# Patient Record
Sex: FEMALE | Race: BLACK OR AFRICAN AMERICAN | NOT HISPANIC OR LATINO | Employment: FULL TIME | URBAN - METROPOLITAN AREA
[De-identification: names, ages, dates, MRNs, and addresses within clinical notes are randomized per-mention and may not be internally consistent; named-entity substitution may affect disease eponyms.]

---

## 2017-01-05 DIAGNOSIS — R53.83 FATIGUE, UNSPECIFIED TYPE: Primary | ICD-10-CM

## 2017-01-05 DIAGNOSIS — R06.00 DYSPNEA, UNSPECIFIED TYPE: ICD-10-CM

## 2017-01-06 ENCOUNTER — DOCUMENTATION (OUTPATIENT)
Dept: BARIATRICS/WEIGHT MGMT | Facility: CLINIC | Age: 47
End: 2017-01-06

## 2017-01-06 RX ORDER — FLUOXETINE 10 MG/1
10 CAPSULE ORAL DAILY
COMMUNITY
End: 2017-01-17 | Stop reason: ALTCHOICE

## 2017-01-11 ENCOUNTER — LAB (OUTPATIENT)
Dept: LAB | Facility: HOSPITAL | Age: 47
End: 2017-01-11

## 2017-01-11 ENCOUNTER — HOSPITAL ENCOUNTER (OUTPATIENT)
Dept: CARDIOLOGY | Facility: HOSPITAL | Age: 47
Discharge: HOME | End: 2017-01-11

## 2017-01-11 ENCOUNTER — HOSPITAL ENCOUNTER (OUTPATIENT)
Dept: GENERAL RADIOLOGY | Facility: HOSPITAL | Age: 47
Discharge: HOME | End: 2017-01-11
Admitting: PHYSICIAN ASSISTANT

## 2017-01-11 DIAGNOSIS — R06.00 DYSPNEA, UNSPECIFIED TYPE: ICD-10-CM

## 2017-01-11 DIAGNOSIS — R53.83 FATIGUE, UNSPECIFIED TYPE: ICD-10-CM

## 2017-01-11 LAB
ALBUMIN SERPL-MCNC: 4 G/DL (ref 3.2–4.8)
ALBUMIN/GLOB SERPL: 1.3 G/DL (ref 1.5–2.5)
ALP SERPL-CCNC: 64 U/L (ref 25–100)
ALT SERPL W P-5'-P-CCNC: 14 U/L (ref 7–40)
ANION GAP SERPL CALCULATED.3IONS-SCNC: 8 MMOL/L (ref 3–11)
AST SERPL-CCNC: 15 U/L (ref 0–33)
BASOPHILS # BLD AUTO: 0.03 10*3/MM3 (ref 0–0.2)
BASOPHILS NFR BLD AUTO: 0.4 % (ref 0–1)
BILIRUB SERPL-MCNC: 0.4 MG/DL (ref 0.3–1.2)
BUN BLD-MCNC: 13 MG/DL (ref 9–23)
BUN/CREAT SERPL: 18.6 (ref 7–25)
CALCIUM SPEC-SCNC: 9.5 MG/DL (ref 8.7–10.4)
CHLORIDE SERPL-SCNC: 103 MMOL/L (ref 99–109)
CO2 SERPL-SCNC: 30 MMOL/L (ref 20–31)
CREAT BLD-MCNC: 0.7 MG/DL (ref 0.6–1.3)
DEPRECATED RDW RBC AUTO: 48.6 FL (ref 37–54)
EOSINOPHIL # BLD AUTO: 0.07 10*3/MM3 (ref 0.1–0.3)
EOSINOPHIL NFR BLD AUTO: 1 % (ref 0–3)
ERYTHROCYTE [DISTWIDTH] IN BLOOD BY AUTOMATED COUNT: 17 % (ref 11.3–14.5)
GFR SERPL CREATININE-BSD FRML MDRD: 110 ML/MIN/1.73
GLOBULIN UR ELPH-MCNC: 3.1 GM/DL
GLUCOSE BLD-MCNC: 96 MG/DL (ref 70–100)
HCT VFR BLD AUTO: 32.2 % (ref 34.5–44)
HGB BLD-MCNC: 10.2 G/DL (ref 11.5–15.5)
IMM GRANULOCYTES # BLD: 0.01 10*3/MM3 (ref 0–0.03)
IMM GRANULOCYTES NFR BLD: 0.1 % (ref 0–0.6)
LYMPHOCYTES # BLD AUTO: 2.43 10*3/MM3 (ref 0.6–4.8)
LYMPHOCYTES NFR BLD AUTO: 35.4 % (ref 24–44)
MCH RBC QN AUTO: 24.5 PG (ref 27–31)
MCHC RBC AUTO-ENTMCNC: 31.7 G/DL (ref 32–36)
MCV RBC AUTO: 77.4 FL (ref 80–99)
MONOCYTES # BLD AUTO: 0.54 10*3/MM3 (ref 0–1)
MONOCYTES NFR BLD AUTO: 7.9 % (ref 0–12)
NEUTROPHILS # BLD AUTO: 3.79 10*3/MM3 (ref 1.5–8.3)
NEUTROPHILS NFR BLD AUTO: 55.2 % (ref 41–71)
PLATELET # BLD AUTO: 481 10*3/MM3 (ref 150–450)
PMV BLD AUTO: 9.9 FL (ref 6–12)
POTASSIUM BLD-SCNC: 4.6 MMOL/L (ref 3.5–5.5)
PROT SERPL-MCNC: 7.1 G/DL (ref 5.7–8.2)
RBC # BLD AUTO: 4.16 10*6/MM3 (ref 3.89–5.14)
SODIUM BLD-SCNC: 141 MMOL/L (ref 132–146)
WBC NRBC COR # BLD: 6.87 10*3/MM3 (ref 3.5–10.8)

## 2017-01-11 PROCEDURE — 85025 COMPLETE CBC W/AUTO DIFF WBC: CPT | Performed by: PHYSICIAN ASSISTANT

## 2017-01-11 PROCEDURE — 71020 HC CHEST PA AND LATERAL: CPT

## 2017-01-11 PROCEDURE — 93005 ELECTROCARDIOGRAM TRACING: CPT | Performed by: PHYSICIAN ASSISTANT

## 2017-01-11 PROCEDURE — 80053 COMPREHEN METABOLIC PANEL: CPT | Performed by: PHYSICIAN ASSISTANT

## 2017-01-11 PROCEDURE — 93010 ELECTROCARDIOGRAM REPORT: CPT | Performed by: INTERNAL MEDICINE

## 2017-01-11 PROCEDURE — 36415 COLL VENOUS BLD VENIPUNCTURE: CPT

## 2017-01-16 ENCOUNTER — DOCUMENTATION (OUTPATIENT)
Dept: BARIATRICS/WEIGHT MGMT | Facility: CLINIC | Age: 47
End: 2017-01-16

## 2017-01-17 ENCOUNTER — CONSULT (OUTPATIENT)
Dept: BARIATRICS/WEIGHT MGMT | Facility: CLINIC | Age: 47
End: 2017-01-17

## 2017-01-17 VITALS
DIASTOLIC BLOOD PRESSURE: 88 MMHG | HEIGHT: 64 IN | BODY MASS INDEX: 43.79 KG/M2 | HEART RATE: 96 BPM | TEMPERATURE: 98.2 F | WEIGHT: 256.5 LBS | SYSTOLIC BLOOD PRESSURE: 141 MMHG

## 2017-01-17 DIAGNOSIS — E66.01 OBESITY, CLASS III, BMI 40-49.9 (MORBID OBESITY) (HCC): Primary | ICD-10-CM

## 2017-01-17 PROCEDURE — 99215 OFFICE O/P EST HI 40 MIN: CPT | Performed by: SURGERY

## 2017-01-17 RX ORDER — SCOLOPAMINE TRANSDERMAL SYSTEM 1 MG/1
1 PATCH, EXTENDED RELEASE TRANSDERMAL ONCE
Status: CANCELLED | OUTPATIENT
Start: 2017-01-17 | End: 2017-01-17

## 2017-01-17 RX ORDER — ACETAMINOPHEN 10 MG/ML
1000 INJECTION, SOLUTION INTRAVENOUS ONCE
Status: CANCELLED | OUTPATIENT
Start: 2017-01-17 | End: 2017-01-17

## 2017-01-17 RX ORDER — SODIUM CHLORIDE 0.9 % (FLUSH) 0.9 %
1-10 SYRINGE (ML) INJECTION AS NEEDED
Status: CANCELLED | OUTPATIENT
Start: 2017-01-17

## 2017-01-17 RX ORDER — SODIUM CHLORIDE, SODIUM LACTATE, POTASSIUM CHLORIDE, CALCIUM CHLORIDE 600; 310; 30; 20 MG/100ML; MG/100ML; MG/100ML; MG/100ML
150 INJECTION, SOLUTION INTRAVENOUS CONTINUOUS
Status: CANCELLED | OUTPATIENT
Start: 2017-01-17

## 2017-01-17 RX ORDER — CHLORHEXIDINE GLUCONATE 0.12 MG/ML
15 RINSE ORAL ONCE
Status: CANCELLED | OUTPATIENT
Start: 2017-01-17

## 2017-01-17 RX ORDER — PANTOPRAZOLE SODIUM 40 MG/10ML
40 INJECTION, POWDER, LYOPHILIZED, FOR SOLUTION INTRAVENOUS ONCE
Status: CANCELLED | OUTPATIENT
Start: 2017-01-17 | End: 2017-01-17

## 2017-01-17 NOTE — MR AVS SNAPSHOT
Laure LEIF Valentin   1/17/2017 4:30 PM   Consult    Dept Phone:  [phone]   Encounter #:  [id number]    Provider:  Eulalio Dick MD   Department:  [facility] BARIATRIC SURGERY                Your Full Care Plan              Today's Medication Changes          These changes are accurate as of: 1/17/17  6:18 PM.  If you have any questions, ask your nurse or doctor.               Stop taking medication(s)listed here:     FLUoxetine 10 MG capsule   Commonly known as:  PROzac   Stopped by:  Eulalio Dick MD           phentermine 37.5 MG tablet   Commonly known as:  ADIPEX-P   Stopped by:  Eulalio Dick MD           TOPAMAX PO   Stopped by:  Eulalio Dick MD                      Your Updated Medication List      Notice  As of 1/17/2017  6:18 PM    You have not been prescribed any medications.            We Performed the Following     Case Request     Clorhexidine Skin Prep     Obtain Informed Consent     Provide NPO Instructions to Patient       You Were Diagnosed With        Codes Comments    Obesity, Class III, BMI 40-49.9 (morbid obesity)    -  Primary ICD-10-CM: E66.01  ICD-9-CM: 278.01       Instructions     None    Patient Instructions History      Upcoming Appointments     Visit Type Date Time Department    CONSULT 1/17/2017  4:30 PM [facility] BARIATRIC SURG [facility]      [facility] Signup     Our records indicate that you have an active [employer] account.    You can view your After Visit Summary by going to [employer] and logging in with your [employer] username and password.  If you don't have a [employer] username and password but a parent or guardian has access to your record, the parent or guardian should login with their own [employer] username and password and access your record to view the After Visit Summary.    If you have questions, you can email [email or web address] or call [phone] to talk to our [employer] staff.   "Remember, MyChart is NOT to be used for urgent needs.  For medical emergencies, dial 911.               Other Info from Your Visit           Allergies     No Known Allergies      Reason for Visit     Consult           Vital Signs     Blood Pressure Pulse Temperature Height Weight Body Mass Index    141/88 (BP Location: Left arm, Patient Position: Sitting) 96 98.2 °F (36.8 °C) 64\" (162.6 cm) 256 lb 8 oz (116 kg) 44.03 kg/m2    Smoking Status                   Never Smoker           Problems and Diagnoses Noted     Obesity, Class III, BMI 40-49.9 (morbid obesity)    -  Primary        "

## 2017-01-17 NOTE — H&P
Create Note         My Note 6:00 PM   Edit           Expand All Collapse All    [facility] BARIATRIC SURGERY  [address]  [facility] [address] [phone]        Patient Name: Laure Valentin.  : 1970        Date of Visit: 2017        Chief Complaint: weight gain; unable to maintain weight loss. Preop LSG     History of Present Illness:     Laure Valentin is a 46 y.o. female who presents today for evaluation, education and consultation regarding weight loss surgery. The patient is interested in sleeve revision      Initial intake eval 16 w/ Dr. 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 Has been thinking about WLS for years, wants LSG. Has done an [place] online seminar, knows pts who have had WLS   Returns for final visit prior to LSG, 256.5/44 (?diff height listed). No changes since I saw her prev In hx or exam except stopped all meds         Medical History          Past Medical History   Diagnosis Date   • Heart murmur     • Heartburn     • Hyperlipidemia     • Joint pain     • Microcytic anemia         10.2/32.2 on iron, up from 9.4/32.4 on intake labs   • Morbid obesity     • Nephrolithiasis     • Thrombocytosis         481K          Surgical History          Past Surgical History   Procedure Laterality Date   • Laparoscopic tubal ligation      • Dilatation and curettage            No Known Allergies  No current outpatient prescriptions on file.   Social History    Social History            Social History   • Marital status:        Spouse name: N/A   • Number of children: N/A   • Years of education: N/A          Occupational History   • Not on file.           Social History Main Topics   • Smoking status: Never Smoker   • Smokeless tobacco: Never Used   • Alcohol use No   • Drug use: No   • Sexual activity: Not on file           Other Topics Concern   • Not on file      Social History Narrative                Family History   Problem Relation Age of Onset   • Hypertension Mother              Review of Systems:  Constitutional: The patient denies fevers and chills.  Cardiovascular: The patient denies heart disease.  Respiratory: The patient denies asthma and apnea.  Gastrointestinal: The patient reports heartburn.  Genitourinary: The patient denies renal insufficiency.   Musculoskeletal: The patient reports joint pain   Neurological: The patient denies seizure and stroke.  Psychiatric: The patient denies anxiety, depression and bipolar disorder.  Endocrine: The patient denies diabetes and thyroid disease.  Hematologic: The patient denies bleeding disorder.  Skin: The patient denies MRSA.     Physical Exam:  Vital Signs:  Weight: 256 lb 8 oz (116 kg)   Body mass index is 44.03 kg/(m^2).  Temp: 98.2 °F (36.8 °C)   Heart Rate: 96   BP: 141/88      Physical Exam   Constitutional: She is oriented to person, place, and time. She appears well-developed and well-nourished.   HENT:   Head: Normocephalic and atraumatic.   Eyes: Conjunctivae are normal. No scleral icterus.   Neck: Neck supple. Carotid bruit is not present. No thyromegaly present.   Cardiovascular: Normal rate and regular rhythm.   No murmur heard.  Pulmonary/Chest: Effort normal and breath sounds normal. No respiratory distress. She has no wheezes. She has no rales.   Abdominal: Soft. Bowel sounds are normal. She exhibits no distension and no mass. There is no hepatosplenomegaly. There is no tenderness. No hernia.       Scars: present btl vertical intra/inf umbo.   Musculoskeletal: Normal range of motion. She exhibits no edema.   Neurological: She is alert and oriented to person, place, and time. Gait normal.   Skin: Skin is warm and dry. No rash noted.   Psychiatric: She has a normal mood and affect. Judgment normal.   Vitals reviewed.        There is no problem list on file for this patient.  Psych Henry 7/16 approp  Kasper - several  "phenterimine     Assessment:     Laure Valentin is a 46 y.o. year old female with medically complicated obesity pursuing sleeve gastrectomy.     Weight loss surgery is deemed medically necessary given the following obesity related comorbidities including dyslipidemia with current Weight: 256 lb 8 oz (116 kg) and Body mass index is 44.03 kg/(m^2)..     Patient is aware that surgery is a tool, and that weight loss is not guaranteed but only seen in the context of appropriate use, follow up and exercise.     Complications  of laparoscopic/possible robotic gastric sleeve were discussed. The patient is well aware of the potential complications of surgery that include but not limited to bleeding, infections, deep venous thrombosis, pulmonary embolism, pulmonary complications such as pneumonia, cardiac events, hernias, small bowel obstruction, damage to the spleen or other organs, bowel injury, disfiguring scars, failure to lose weight, need for additional surgery, conversion to an open procedure, and death. Patient is also aware of complications which apply in this particular procedure that can include but are not limited to a \"leak\" at the staple line which in some instances may require conversion to gastric bypass.     Discussed the risks, benefits and alternative therapies at great length as outlined in our extensive consent forms, consent videos, and educational teaching process under the direction of the center's .     A copy of the patient's signed informed consent is on file.  > 10 min spent discussing avoiding tob and 2nd hand smoke to minimize risk leak           Eulalio Dick MD         Plan:  The patient has been advised that a letter of medical support must be obtained from her primary care physician or referring provider. A psychological evaluation will be performed for this patient as well. Preoperative testing will include: CBC, CMP, Fasting Lipids, TSH, H.Pylori, CXR and EKG "      The patient was advised to start a high protein and low carbohydrate diet. The patient was given individualized information by our dietician along with general group information and instructions.      Information on MARISEL educational video was given to the patient. This is an internet based educational video that explains the surgical procedure chosen and answers basic questions regarding that procedure.      Lastly, the consultation plan was reviewed with the patient.

## 2017-01-17 NOTE — PROGRESS NOTES
[facility] BARIATRIC SURGERY  [address]  [facility] [address]  [phone]      Patient  Name:  Laure Valentin.  :  1970      Date of Visit: 2017      Chief Complaint:  weight gain; unable to maintain weight loss. Preop LSG    History of Present Illness:    Laure Valentin is a 46 y.o. female who presents today for evaluation, education and consultation regarding weight loss surgery. The patient is interested in sleeve revision     Initial intake eval 16 w/ Dr. 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  Has been thinking about WLS for years, wants LSG.  Has done an [place] online seminar, knows pts who have had WLS   Returns for final visit prior to LSG, 256./44 (?diff height listed).  No changes since I saw her prev  In hx or exam except stopped all meds      Past Medical History   Diagnosis Date   • Heart murmur    • Heartburn    • Hyperlipidemia    • Joint pain    • Microcytic anemia      10.2/32.2 on iron, up from 9.4/32.4 on intake labs   • Morbid obesity    • Nephrolithiasis    • Thrombocytosis      481K     Past Surgical History   Procedure Laterality Date   • Laparoscopic tubal ligation     • Dilatation and curettage       No Known Allergies  No current outpatient prescriptions on file.  Social History     Social History   • Marital status:      Spouse name: N/A   • Number of children: N/A   • Years of education: N/A     Occupational History   • Not on file.     Social History Main Topics   • Smoking status: Never Smoker   • Smokeless tobacco: Never Used   • Alcohol use No   • Drug use: No   • Sexual activity: Not on file     Other Topics Concern   • Not on file     Social History Narrative     Family History   Problem Relation Age of Onset   • Hypertension Mother          Review of Systems:  Constitutional:  The patient denies fevers and chills.  Cardiovascular:  The patient denies heart disease.  Respiratory:  The  patient denies asthma and apnea.  Gastrointestinal:  The patient reports heartburn.  Genitourinary:  The patient denies renal insufficiency.    Musculoskeletal:  The patient reports joint pain   Neurological:  The patient denies seizure and stroke.  Psychiatric:  The patient denies anxiety, depression and bipolar disorder.  Endocrine:  The patient denies diabetes and thyroid disease.  Hematologic:  The patient denies bleeding disorder.  Skin:  The patient denies MRSA.    Physical Exam:  Vital Signs:  Weight: 256 lb 8 oz (116 kg)   Body mass index is 44.03 kg/(m^2).  Temp: 98.2 °F (36.8 °C)   Heart Rate: 96   BP: 141/88     Physical Exam   Constitutional: She is oriented to person, place, and time. She appears well-developed and well-nourished.   HENT:   Head: Normocephalic and atraumatic.   Eyes: Conjunctivae are normal. No scleral icterus.   Neck: Neck supple. Carotid bruit is not present. No thyromegaly present.   Cardiovascular: Normal rate and regular rhythm.    No murmur heard.  Pulmonary/Chest: Effort normal and breath sounds normal. No respiratory distress. She has no wheezes. She has no rales.   Abdominal: Soft. Bowel sounds are normal. She exhibits no distension and no mass. There is no hepatosplenomegaly. There is no tenderness. No hernia.       Scars:  present btl vertical intra/inf umbo.   Musculoskeletal: Normal range of motion. She exhibits no edema.   Neurological: She is alert and oriented to person, place, and time. Gait normal.   Skin: Skin is warm and dry. No rash noted.   Psychiatric: She has a normal mood and affect. Judgment normal.   Vitals reviewed.       There is no problem list on file for this patient.  Psych Brown 7/16 approp  Dinh - several phenterimine    Assessment:    Laure Valentin is a 46 y.o. year old female with medically complicated obesity pursuing sleeve gastrectomy.    Weight loss surgery is deemed medically necessary given the following obesity related comorbidities  "including dyslipidemia with current Weight: 256 lb 8 oz (116 kg) and Body mass index is 44.03 kg/(m^2)..    Patient is aware that surgery is a tool, and that weight loss is not guaranteed but only seen in the context of appropriate use, follow up and exercise.    Complications  of laparoscopic/possible robotic gastric sleeve were discussed. The patient is well aware of the potential complications of surgery that include but not limited to bleeding, infections, deep venous thrombosis, pulmonary embolism, pulmonary complications such as pneumonia, cardiac events, hernias, small bowel obstruction, damage to the spleen or other organs, bowel injury, disfiguring scars, failure to lose weight, need for additional surgery, conversion to an open procedure, and death. Patient is also aware of complications which apply in this particular procedure that can include but are not limited to a \"leak\" at the staple line which in some instances may require conversion to gastric bypass.    Discussed the risks, benefits and alternative therapies at great length as outlined in our extensive consent forms, consent videos, and educational teaching process under the direction of the center's .    A copy of the patient's signed informed consent is on file.  > 10 min spent discussing avoiding tob and 2nd hand smoke to minimize risk leak        Eulalio Dick MD       Plan:  The patient has been advised that a letter of medical support must be obtained from her primary care physician or referring provider. A psychological evaluation will be performed for this patient as well. Preoperative testing will include: CBC, CMP, Fasting Lipids, TSH, H.Pylori, CXR and EKG     The patient was advised to start a high protein and low carbohydrate diet.  The patient was given individualized information by our dietician along with general group information and instructions.     Information on MARISEL educational video was given to the " patient.  This is an internet based educational video that explains the surgical procedure chosen and answers basic questions regarding that procedure.     Lastly, the consultation plan was reviewed with the patient.  Pt aware may need periop blood transfusion, after d/c r/b/a rx agreeable if I deem approp

## 2017-01-17 NOTE — PROGRESS NOTES
[facility] BARIATRIC SURGERY  [address]  [facility] [address] [phone]      Patient  Name:  Laure Valentin.  :  1970      Date of Visit: 2017      Chief Complaint:  weight gain; unable to maintain weight loss    History of Present Illness:    Laure Valentin is a 46 y.o. female who presents today for evaluation, education and consultation regarding weight loss surgery. The patient is interested in sleeve revision     Initial intake eval 16 w/ Dr. 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  Has been thinking about WLS for years, wants LSG.  Has done an [place] online seminar, knows pts who have had WLS     Past Medical History   Diagnosis Date   • Heart murmur    • Heartburn    • Hyperlipidemia    • Joint pain    • Morbid obesity    • Nephrolithiasis      Past Surgical History   Procedure Laterality Date   • Laparoscopic tubal ligation     • Dilatation and curettage       No Known Allergies    Current Outpatient Prescriptions:   •  FLUoxetine (PROzac) 10 MG capsule, Take 10 mg by mouth Daily., Disp: , Rfl:   •  phentermine (ADIPEX-P) 37.5 MG tablet, Take 1 tablet by mouth Every Morning., Disp: 30 tablet, Rfl: 0  •  Topiramate (TOPAMAX PO), Take  by mouth., Disp: , Rfl:   Social History     Social History   • Marital status:      Spouse name: N/A   • Number of children: N/A   • Years of education: N/A     Occupational History   • Not on file.     Social History Main Topics   • Smoking status: Never Smoker   • Smokeless tobacco: Never Used   • Alcohol use No   • Drug use: No   • Sexual activity: Not on file     Other Topics Concern   • Not on file     Social History Narrative     Family History   Problem Relation Age of Onset   • Hypertension Mother          Review of Systems:  Constitutional:  The patient denies fevers and chills.  Cardiovascular:  The patient denies heart disease.  Respiratory:  The patient denies asthma and  apnea.  Gastrointestinal:  The patient reports heartburn.  Genitourinary:  The patient denies renal insufficiency.    Musculoskeletal:  The patient reports joint pain   Neurological:  The patient denies seizure and stroke.  Psychiatric:  The patient denies anxiety, depression and bipolar disorder.  Endocrine:  The patient denies diabetes and thyroid disease.  Hematologic:  The patient denies bleeding disorder.  Skin:  The patient denies MRSA.    Physical Exam:  Vital Signs:      There is no height or weight on file to calculate BMI.                Physical Exam   Constitutional: She is oriented to person, place, and time. She appears well-developed and well-nourished.   HENT:   Head: Normocephalic and atraumatic.   Eyes: Conjunctivae are normal. No scleral icterus.   Neck: Neck supple. No thyromegaly present.   Cardiovascular: Normal rate and regular rhythm.    No murmur heard.  Pulmonary/Chest: Effort normal and breath sounds normal. No respiratory distress. She has no wheezes. She has no rales.   Abdominal: Soft. Bowel sounds are normal. She exhibits no distension and no mass. There is no tenderness. No hernia.   Scars:  present btl vertical intra/inf umbo.   Musculoskeletal: Normal range of motion. She exhibits no edema.   Neurological: She is alert and oriented to person, place, and time. Gait normal.   Skin: Skin is warm and dry. No rash noted.   Psychiatric: She has a normal mood and affect. Judgment normal.   Vitals reviewed.       There is no problem list on file for this patient.      Assessment:    Laure Valentin is a 46 y.o. year old female with medically complicated obesity pursuing sleeve gastrectomy.    Weight loss surgery is deemed medically necessary given the following obesity related comorbidities including dyslipidemia with current   and There is no height or weight on file to calculate BMI..      Plan:  The patient has been advised that a letter of medical support must be obtained from her  primary care physician or referring provider. A psychological evaluation will be performed for this patient as well. Preoperative testing will include: CBC, CMP, Fasting Lipids, TSH, H.Pylori, CXR and EKG     The patient was advised to start a high protein and low carbohydrate diet.  The patient was given individualized information by our dietician along with general group information and instructions.     Information on MARISEL educational video was given to the patient.  This is an internet based educational video that explains the surgical procedure chosen and answers basic questions regarding that procedure.     Lastly, the consultation plan was reviewed with the patient.

## 2017-01-22 ENCOUNTER — APPOINTMENT (OUTPATIENT)
Dept: PREADMISSION TESTING | Facility: HOSPITAL | Age: 47
End: 2017-01-22

## 2017-01-22 LAB
DEPRECATED RDW RBC AUTO: 47.9 FL (ref 37–54)
ERYTHROCYTE [DISTWIDTH] IN BLOOD BY AUTOMATED COUNT: 17.2 % (ref 11.3–14.5)
HBA1C MFR BLD: 5.5 % (ref 4.8–5.6)
HCT VFR BLD AUTO: 36.9 % (ref 34.5–44)
HGB BLD-MCNC: 11.5 G/DL (ref 11.5–15.5)
MCH RBC QN AUTO: 24.1 PG (ref 27–31)
MCHC RBC AUTO-ENTMCNC: 31.2 G/DL (ref 32–36)
MCV RBC AUTO: 77.2 FL (ref 80–99)
PLATELET # BLD AUTO: 492 10*3/MM3 (ref 150–450)
PMV BLD AUTO: 10.6 FL (ref 6–12)
RBC # BLD AUTO: 4.78 10*6/MM3 (ref 3.89–5.14)
WBC NRBC COR # BLD: 7.62 10*3/MM3 (ref 3.5–10.8)

## 2017-01-22 PROCEDURE — 83036 HEMOGLOBIN GLYCOSYLATED A1C: CPT | Performed by: ANESTHESIOLOGY

## 2017-01-22 PROCEDURE — 85027 COMPLETE CBC AUTOMATED: CPT | Performed by: ANESTHESIOLOGY

## 2017-01-22 PROCEDURE — 36415 COLL VENOUS BLD VENIPUNCTURE: CPT

## 2017-01-22 RX ORDER — LANOLIN ALCOHOL/MO/W.PET/CERES
1000 CREAM (GRAM) TOPICAL DAILY
COMMUNITY
End: 2018-06-27

## 2017-01-22 RX ORDER — ASCORBIC ACID 500 MG
500 TABLET ORAL DAILY
COMMUNITY
End: 2018-01-19

## 2017-01-22 RX ORDER — FERROUS SULFATE TAB EC 324 MG (65 MG FE EQUIVALENT) 324 (65 FE) MG
324 TABLET DELAYED RESPONSE ORAL
COMMUNITY
End: 2018-06-27

## 2017-01-22 NOTE — DISCHARGE INSTRUCTIONS
The following information and instructions were given:    NPO after MN except sips of water with routine prescribed medication (except blood thinner, diabetes, or weight reducing medication) unless otherwise instructed by your physician.  Do not eat, drink, smoke or chew gum after MN the night before surgery. This also includes no mints.    DO NOT shave, wear makeup or dark nail polish.    Remove all jewelry (advised to go to jeweler if unable to remove).    Leave anything you consider valuable at home.    Leave your suitcase in the car until after your surgery.    Bring the following with you (if applicable)   -picture ID and insurance cards   -Co-pay/deductible required by insurance   -Medications in the original bottles (not a list) including all over-the-counter  medications if not brought to PAT   -Copy of advance directive, living will or power of  documents if not  brought to PAT   -CPAP or BIPAP mask and tubing (do not bring machine)   -Skin prep instructions sheet   -PAT Pass   Education booklet, brochure, handout or binder given to patient.    Pain Control After Surgery handout given to patient.    Respirex use (handout given to patient) and pneumonia prevention.    Signs and Symptoms of infection.    DVT Prevention stressing the importance of ambulation.    Patient to apply Chlorhexadine wipes to surgical area (as instructed) the night before procedure and the AM of procedure.      Post sleeve sheet given

## 2017-01-26 ENCOUNTER — HOSPITAL ENCOUNTER (INPATIENT)
Facility: HOSPITAL | Age: 47
LOS: 2 days | Discharge: HOME | End: 2017-01-28
Attending: SURGERY | Admitting: SURGERY

## 2017-01-26 ENCOUNTER — ANESTHESIA EVENT (OUTPATIENT)
Dept: PERIOP | Facility: HOSPITAL | Age: 47
End: 2017-01-26

## 2017-01-26 ENCOUNTER — ANESTHESIA (OUTPATIENT)
Dept: PERIOP | Facility: HOSPITAL | Age: 47
End: 2017-01-26

## 2017-01-26 DIAGNOSIS — E66.01 OBESITY, CLASS III, BMI 40-49.9 (MORBID OBESITY) (HCC): ICD-10-CM

## 2017-01-26 PROBLEM — E66.813 OBESITY, CLASS III, BMI 40-49.9 (MORBID OBESITY): Status: ACTIVE | Noted: 2017-01-26

## 2017-01-26 LAB
ABO GROUP BLD: NORMAL
B-HCG UR QL: NEGATIVE
BLD GP AB SCN SERPL QL: NEGATIVE
INTERNAL NEGATIVE CONTROL: NORMAL
INTERNAL POSITIVE CONTROL: REACTIVE
Lab: NORMAL
RH BLD: POSITIVE

## 2017-01-26 PROCEDURE — 0BQS4ZZ REPAIR LEFT DIAPHRAGM, PERCUTANEOUS ENDOSCOPIC APPROACH: ICD-10-PCS | Performed by: SURGERY

## 2017-01-26 PROCEDURE — 94799 UNLISTED PULMONARY SVC/PX: CPT

## 2017-01-26 PROCEDURE — 43775 LAP SLEEVE GASTRECTOMY: CPT | Performed by: SURGERY

## 2017-01-26 PROCEDURE — 0DB64Z3 EXCISION OF STOMACH, PERCUTANEOUS ENDOSCOPIC APPROACH, VERTICAL: ICD-10-PCS | Performed by: SURGERY

## 2017-01-26 PROCEDURE — 25010000002 FENTANYL CITRATE (PF) 100 MCG/2ML SOLUTION: Performed by: NURSE ANESTHETIST, CERTIFIED REGISTERED

## 2017-01-26 PROCEDURE — 25010000002 PROPOFOL 1000 MG/ML EMULSION: Performed by: NURSE ANESTHETIST, CERTIFIED REGISTERED

## 2017-01-26 PROCEDURE — 25010000002 PROMETHAZINE PER 50 MG: Performed by: NURSE ANESTHETIST, CERTIFIED REGISTERED

## 2017-01-26 PROCEDURE — 25010000002 NEOSTIGMINE 10 MG/10ML SOLUTION: Performed by: NURSE ANESTHETIST, CERTIFIED REGISTERED

## 2017-01-26 PROCEDURE — 86901 BLOOD TYPING SEROLOGIC RH(D): CPT

## 2017-01-26 PROCEDURE — 0DJ08ZZ INSPECTION OF UPPER INTESTINAL TRACT, VIA NATURAL OR ARTIFICIAL OPENING ENDOSCOPIC: ICD-10-PCS | Performed by: SURGERY

## 2017-01-26 PROCEDURE — 25010000002 ENOXAPARIN PER 10 MG: Performed by: SURGERY

## 2017-01-26 PROCEDURE — 93010 ELECTROCARDIOGRAM REPORT: CPT | Performed by: INTERNAL MEDICINE

## 2017-01-26 PROCEDURE — 25010000002 PROPOFOL 10 MG/ML EMULSION: Performed by: NURSE ANESTHETIST, CERTIFIED REGISTERED

## 2017-01-26 PROCEDURE — 88307 TISSUE EXAM BY PATHOLOGIST: CPT | Performed by: SURGERY

## 2017-01-26 PROCEDURE — 86850 RBC ANTIBODY SCREEN: CPT

## 2017-01-26 PROCEDURE — C1763 CONN TISS, NON-HUMAN: HCPCS | Performed by: SURGERY

## 2017-01-26 PROCEDURE — 25010000002 ONDANSETRON PER 1 MG: Performed by: NURSE ANESTHETIST, CERTIFIED REGISTERED

## 2017-01-26 PROCEDURE — 25010000002 BUPRENORPHINE PER 0.1 MG: Performed by: NURSE ANESTHETIST, CERTIFIED REGISTERED

## 2017-01-26 PROCEDURE — 86900 BLOOD TYPING SEROLOGIC ABO: CPT

## 2017-01-26 PROCEDURE — 93005 ELECTROCARDIOGRAM TRACING: CPT | Performed by: SURGERY

## 2017-01-26 PROCEDURE — 25010000002 MORPHINE PER 10 MG: Performed by: SURGERY

## 2017-01-26 PROCEDURE — 25010000002 DEXAMETHASONE SODIUM PHOSPHATE 10 MG/ML SOLUTION: Performed by: NURSE ANESTHETIST, CERTIFIED REGISTERED

## 2017-01-26 PROCEDURE — 25010000003 CEFAZOLIN IN DEXTROSE 2-4 GM/100ML-% SOLUTION: Performed by: SURGERY

## 2017-01-26 PROCEDURE — 0BQR4ZZ REPAIR RIGHT DIAPHRAGM, PERCUTANEOUS ENDOSCOPIC APPROACH: ICD-10-PCS | Performed by: SURGERY

## 2017-01-26 DEVICE — PERI-STRIPS DRY WITH VERITAS COLLAGEN MATRIX (PSD-V) IS PREPARED FROM DEHYDRATED BOVINE PERICARDIUM PROCURED FROM CATTLE UNDER 30 MONTHS OF AGE IN THE UNITED STATES. ONE (1) TUBE OF PSD GEL (GEL) IS PROVIDED FOR EVERY TWO (2) POUCHES OF PSD-V. THE GEL IS USED TO CREATE A TEMPORARY BOND BETWEEN THE PSD-V BUTTRESS AND THE SURGICAL STAPLER JAWS UNTIL THE STAPLER IS POSITIONED AND FIRED.
Type: IMPLANTABLE DEVICE | Site: STOMACH | Status: FUNCTIONAL
Brand: PERI-STRIPS DRY WITH VERITAS COLLAGEN MATRIX

## 2017-01-26 RX ORDER — FENTANYL CITRATE 50 UG/ML
50 INJECTION, SOLUTION INTRAMUSCULAR; INTRAVENOUS
Status: DISCONTINUED | OUTPATIENT
Start: 2017-01-26 | End: 2017-01-26 | Stop reason: HOSPADM

## 2017-01-26 RX ORDER — DIPHENHYDRAMINE HYDROCHLORIDE 50 MG/ML
25 INJECTION INTRAMUSCULAR; INTRAVENOUS EVERY 4 HOURS PRN
Status: DISCONTINUED | OUTPATIENT
Start: 2017-01-26 | End: 2017-01-28 | Stop reason: HOSPADM

## 2017-01-26 RX ORDER — MEPERIDINE HYDROCHLORIDE 25 MG/ML
12.5 INJECTION INTRAMUSCULAR; INTRAVENOUS; SUBCUTANEOUS
Status: DISCONTINUED | OUTPATIENT
Start: 2017-01-26 | End: 2017-01-26 | Stop reason: HOSPADM

## 2017-01-26 RX ORDER — ONDANSETRON 4 MG/1
4 TABLET, FILM COATED ORAL EVERY 6 HOURS PRN
Status: DISCONTINUED | OUTPATIENT
Start: 2017-01-26 | End: 2017-01-28 | Stop reason: HOSPADM

## 2017-01-26 RX ORDER — ALBUTEROL SULFATE 2.5 MG/3ML
2.5 SOLUTION RESPIRATORY (INHALATION) EVERY 4 HOURS PRN
Status: DISCONTINUED | OUTPATIENT
Start: 2017-01-26 | End: 2017-01-28 | Stop reason: HOSPADM

## 2017-01-26 RX ORDER — DEXAMETHASONE SODIUM PHOSPHATE 10 MG/ML
INJECTION, SOLUTION INTRAMUSCULAR; INTRAVENOUS AS NEEDED
Status: DISCONTINUED | OUTPATIENT
Start: 2017-01-26 | End: 2017-01-26 | Stop reason: SURG

## 2017-01-26 RX ORDER — ONDANSETRON 2 MG/ML
4 INJECTION INTRAMUSCULAR; INTRAVENOUS EVERY 6 HOURS PRN
Status: DISCONTINUED | OUTPATIENT
Start: 2017-01-26 | End: 2017-01-28 | Stop reason: HOSPADM

## 2017-01-26 RX ORDER — PROMETHAZINE HYDROCHLORIDE 25 MG/1
25 SUPPOSITORY RECTAL ONCE AS NEEDED
Status: COMPLETED | OUTPATIENT
Start: 2017-01-26 | End: 2017-01-26

## 2017-01-26 RX ORDER — PROMETHAZINE HYDROCHLORIDE 25 MG/ML
6.25 INJECTION, SOLUTION INTRAMUSCULAR; INTRAVENOUS ONCE AS NEEDED
Status: COMPLETED | OUTPATIENT
Start: 2017-01-26 | End: 2017-01-26

## 2017-01-26 RX ORDER — SCOLOPAMINE TRANSDERMAL SYSTEM 1 MG/1
1 PATCH, EXTENDED RELEASE TRANSDERMAL ONCE
Status: DISCONTINUED | OUTPATIENT
Start: 2017-01-26 | End: 2017-01-26

## 2017-01-26 RX ORDER — LORAZEPAM 2 MG/ML
0.5 INJECTION INTRAMUSCULAR EVERY 12 HOURS PRN
Status: DISCONTINUED | OUTPATIENT
Start: 2017-01-26 | End: 2017-01-28 | Stop reason: HOSPADM

## 2017-01-26 RX ORDER — PROMETHAZINE HYDROCHLORIDE 25 MG/ML
12.5 INJECTION, SOLUTION INTRAMUSCULAR; INTRAVENOUS EVERY 6 HOURS PRN
Status: DISCONTINUED | OUTPATIENT
Start: 2017-01-26 | End: 2017-01-28 | Stop reason: HOSPADM

## 2017-01-26 RX ORDER — LABETALOL HYDROCHLORIDE 5 MG/ML
10 INJECTION, SOLUTION INTRAVENOUS
Status: DISCONTINUED | OUTPATIENT
Start: 2017-01-26 | End: 2017-01-28 | Stop reason: HOSPADM

## 2017-01-26 RX ORDER — PANTOPRAZOLE SODIUM 40 MG/10ML
40 INJECTION, POWDER, LYOPHILIZED, FOR SOLUTION INTRAVENOUS
Status: DISCONTINUED | OUTPATIENT
Start: 2017-01-27 | End: 2017-01-28 | Stop reason: HOSPADM

## 2017-01-26 RX ORDER — FAMOTIDINE 20 MG/1
20 TABLET, FILM COATED ORAL ONCE
Status: CANCELLED | OUTPATIENT
Start: 2017-01-26 | End: 2017-01-26

## 2017-01-26 RX ORDER — BUPIVACAINE HYDROCHLORIDE AND EPINEPHRINE 2.5; 5 MG/ML; UG/ML
INJECTION, SOLUTION EPIDURAL; INFILTRATION; INTRACAUDAL; PERINEURAL AS NEEDED
Status: DISCONTINUED | OUTPATIENT
Start: 2017-01-26 | End: 2017-01-26 | Stop reason: HOSPADM

## 2017-01-26 RX ORDER — MIDAZOLAM HYDROCHLORIDE 1 MG/ML
1 INJECTION INTRAMUSCULAR; INTRAVENOUS
Status: DISCONTINUED | OUTPATIENT
Start: 2017-01-26 | End: 2017-01-26 | Stop reason: HOSPADM

## 2017-01-26 RX ORDER — ACETAMINOPHEN 10 MG/ML
1000 INJECTION, SOLUTION INTRAVENOUS EVERY 6 HOURS
Status: COMPLETED | OUTPATIENT
Start: 2017-01-26 | End: 2017-01-27

## 2017-01-26 RX ORDER — HYDROMORPHONE HYDROCHLORIDE 1 MG/ML
0.5 INJECTION, SOLUTION INTRAMUSCULAR; INTRAVENOUS; SUBCUTANEOUS
Status: DISCONTINUED | OUTPATIENT
Start: 2017-01-26 | End: 2017-01-26 | Stop reason: HOSPADM

## 2017-01-26 RX ORDER — PROPOFOL 10 MG/ML
VIAL (ML) INTRAVENOUS AS NEEDED
Status: DISCONTINUED | OUTPATIENT
Start: 2017-01-26 | End: 2017-01-26 | Stop reason: SURG

## 2017-01-26 RX ORDER — FAMOTIDINE 10 MG/ML
20 INJECTION, SOLUTION INTRAVENOUS ONCE
Status: CANCELLED | OUTPATIENT
Start: 2017-01-26 | End: 2017-01-26

## 2017-01-26 RX ORDER — HYDROCODONE BITARTRATE AND ACETAMINOPHEN 7.5; 325 MG/1; MG/1
1 TABLET ORAL EVERY 4 HOURS PRN
Status: DISCONTINUED | OUTPATIENT
Start: 2017-01-26 | End: 2017-01-28 | Stop reason: HOSPADM

## 2017-01-26 RX ORDER — GLYCOPYRROLATE 0.2 MG/ML
INJECTION INTRAMUSCULAR; INTRAVENOUS AS NEEDED
Status: DISCONTINUED | OUTPATIENT
Start: 2017-01-26 | End: 2017-01-26 | Stop reason: SURG

## 2017-01-26 RX ORDER — LIDOCAINE HYDROCHLORIDE 10 MG/ML
INJECTION, SOLUTION EPIDURAL; INFILTRATION; INTRACAUDAL; PERINEURAL AS NEEDED
Status: DISCONTINUED | OUTPATIENT
Start: 2017-01-26 | End: 2017-01-26 | Stop reason: SURG

## 2017-01-26 RX ORDER — SODIUM CHLORIDE 0.9 % (FLUSH) 0.9 %
1-10 SYRINGE (ML) INJECTION AS NEEDED
Status: CANCELLED | OUTPATIENT
Start: 2017-01-26

## 2017-01-26 RX ORDER — ONDANSETRON 2 MG/ML
4 INJECTION INTRAMUSCULAR; INTRAVENOUS ONCE AS NEEDED
Status: DISCONTINUED | OUTPATIENT
Start: 2017-01-26 | End: 2017-01-26 | Stop reason: HOSPADM

## 2017-01-26 RX ORDER — MORPHINE SULFATE 2 MG/ML
6 INJECTION, SOLUTION INTRAMUSCULAR; INTRAVENOUS
Status: DISCONTINUED | OUTPATIENT
Start: 2017-01-26 | End: 2017-01-28 | Stop reason: HOSPADM

## 2017-01-26 RX ORDER — BUPIVACAINE HYDROCHLORIDE 2.5 MG/ML
INJECTION, SOLUTION EPIDURAL; INFILTRATION; INTRACAUDAL AS NEEDED
Status: DISCONTINUED | OUTPATIENT
Start: 2017-01-26 | End: 2017-01-26 | Stop reason: SURG

## 2017-01-26 RX ORDER — MAGNESIUM HYDROXIDE 1200 MG/15ML
LIQUID ORAL AS NEEDED
Status: DISCONTINUED | OUTPATIENT
Start: 2017-01-26 | End: 2017-01-26 | Stop reason: HOSPADM

## 2017-01-26 RX ORDER — SODIUM CHLORIDE, SODIUM LACTATE, POTASSIUM CHLORIDE, CALCIUM CHLORIDE 600; 310; 30; 20 MG/100ML; MG/100ML; MG/100ML; MG/100ML
150 INJECTION, SOLUTION INTRAVENOUS CONTINUOUS
Status: DISCONTINUED | OUTPATIENT
Start: 2017-01-26 | End: 2017-01-26 | Stop reason: HOSPADM

## 2017-01-26 RX ORDER — SIMETHICONE 80 MG
80 TABLET,CHEWABLE ORAL 4 TIMES DAILY PRN
Status: DISCONTINUED | OUTPATIENT
Start: 2017-01-26 | End: 2017-01-28 | Stop reason: HOSPADM

## 2017-01-26 RX ORDER — BUPIVACAINE HCL/0.9 % NACL/PF 0.1 %
3 PLASTIC BAG, INJECTION (ML) EPIDURAL EVERY 8 HOURS
Status: COMPLETED | OUTPATIENT
Start: 2017-01-26 | End: 2017-01-26

## 2017-01-26 RX ORDER — PANTOPRAZOLE SODIUM 40 MG/10ML
40 INJECTION, POWDER, LYOPHILIZED, FOR SOLUTION INTRAVENOUS ONCE
Status: COMPLETED | OUTPATIENT
Start: 2017-01-26 | End: 2017-01-26

## 2017-01-26 RX ORDER — CHLORHEXIDINE GLUCONATE 0.12 MG/ML
15 RINSE ORAL ONCE
Status: COMPLETED | OUTPATIENT
Start: 2017-01-26 | End: 2017-01-26

## 2017-01-26 RX ORDER — ATRACURIUM BESYLATE 10 MG/ML
INJECTION, SOLUTION INTRAVENOUS AS NEEDED
Status: DISCONTINUED | OUTPATIENT
Start: 2017-01-26 | End: 2017-01-26 | Stop reason: SURG

## 2017-01-26 RX ORDER — LORAZEPAM 1 MG/1
1 TABLET ORAL EVERY 12 HOURS PRN
Status: DISCONTINUED | OUTPATIENT
Start: 2017-01-26 | End: 2017-01-27 | Stop reason: SDUPTHER

## 2017-01-26 RX ORDER — SODIUM CHLORIDE, SODIUM LACTATE, POTASSIUM CHLORIDE, CALCIUM CHLORIDE 600; 310; 30; 20 MG/100ML; MG/100ML; MG/100ML; MG/100ML
9 INJECTION, SOLUTION INTRAVENOUS CONTINUOUS
Status: CANCELLED | OUTPATIENT
Start: 2017-01-26

## 2017-01-26 RX ORDER — SODIUM CHLORIDE 9 MG/ML
INJECTION, SOLUTION INTRAVENOUS AS NEEDED
Status: DISCONTINUED | OUTPATIENT
Start: 2017-01-26 | End: 2017-01-26 | Stop reason: HOSPADM

## 2017-01-26 RX ORDER — NALOXONE HCL 0.4 MG/ML
0.4 VIAL (ML) INJECTION
Status: DISCONTINUED | OUTPATIENT
Start: 2017-01-26 | End: 2017-01-28 | Stop reason: HOSPADM

## 2017-01-26 RX ORDER — PROMETHAZINE HYDROCHLORIDE 25 MG/1
25 TABLET ORAL ONCE AS NEEDED
Status: COMPLETED | OUTPATIENT
Start: 2017-01-26 | End: 2017-01-26

## 2017-01-26 RX ORDER — FENTANYL CITRATE 50 UG/ML
INJECTION, SOLUTION INTRAMUSCULAR; INTRAVENOUS AS NEEDED
Status: DISCONTINUED | OUTPATIENT
Start: 2017-01-26 | End: 2017-01-26 | Stop reason: SURG

## 2017-01-26 RX ORDER — LIDOCAINE HYDROCHLORIDE 10 MG/ML
1 INJECTION, SOLUTION EPIDURAL; INFILTRATION; INTRACAUDAL; PERINEURAL ONCE
Status: COMPLETED | OUTPATIENT
Start: 2017-01-26 | End: 2017-01-26

## 2017-01-26 RX ORDER — ONDANSETRON 2 MG/ML
INJECTION INTRAMUSCULAR; INTRAVENOUS AS NEEDED
Status: DISCONTINUED | OUTPATIENT
Start: 2017-01-26 | End: 2017-01-26 | Stop reason: SURG

## 2017-01-26 RX ORDER — HYDROMORPHONE HYDROCHLORIDE 2 MG/1
2 TABLET ORAL EVERY 4 HOURS PRN
Status: DISCONTINUED | OUTPATIENT
Start: 2017-01-26 | End: 2017-01-28 | Stop reason: HOSPADM

## 2017-01-26 RX ORDER — METOCLOPRAMIDE HYDROCHLORIDE 5 MG/ML
10 INJECTION INTRAMUSCULAR; INTRAVENOUS EVERY 6 HOURS PRN
Status: DISCONTINUED | OUTPATIENT
Start: 2017-01-26 | End: 2017-01-28 | Stop reason: HOSPADM

## 2017-01-26 RX ORDER — BUPRENORPHINE HYDROCHLORIDE 0.32 MG/ML
INJECTION INTRAMUSCULAR; INTRAVENOUS AS NEEDED
Status: DISCONTINUED | OUTPATIENT
Start: 2017-01-26 | End: 2017-01-26 | Stop reason: SURG

## 2017-01-26 RX ORDER — SODIUM CHLORIDE AND POTASSIUM CHLORIDE 150; 450 MG/100ML; MG/100ML
125 INJECTION, SOLUTION INTRAVENOUS CONTINUOUS
Status: DISCONTINUED | OUTPATIENT
Start: 2017-01-27 | End: 2017-01-28 | Stop reason: HOSPADM

## 2017-01-26 RX ORDER — ACETAMINOPHEN 10 MG/ML
1000 INJECTION, SOLUTION INTRAVENOUS ONCE
Status: COMPLETED | OUTPATIENT
Start: 2017-01-26 | End: 2017-01-26

## 2017-01-26 RX ORDER — MORPHINE SULFATE 4 MG/ML
4 INJECTION, SOLUTION INTRAMUSCULAR; INTRAVENOUS
Status: DISCONTINUED | OUTPATIENT
Start: 2017-01-26 | End: 2017-01-27 | Stop reason: SDUPTHER

## 2017-01-26 RX ORDER — FIBRINOGEN HUMAN AND THROMBIN HUMAN 4 ML
KIT TOPICAL AS NEEDED
Status: DISCONTINUED | OUTPATIENT
Start: 2017-01-26 | End: 2017-01-26 | Stop reason: HOSPADM

## 2017-01-26 RX ORDER — CEFAZOLIN SODIUM 2 G/100ML
2 INJECTION, SOLUTION INTRAVENOUS ONCE
Status: COMPLETED | OUTPATIENT
Start: 2017-01-26 | End: 2017-01-26

## 2017-01-26 RX ORDER — SODIUM CHLORIDE, SODIUM LACTATE, POTASSIUM CHLORIDE, CALCIUM CHLORIDE 600; 310; 30; 20 MG/100ML; MG/100ML; MG/100ML; MG/100ML
150 INJECTION, SOLUTION INTRAVENOUS CONTINUOUS
Status: DISCONTINUED | OUTPATIENT
Start: 2017-01-26 | End: 2017-01-27 | Stop reason: SDUPTHER

## 2017-01-26 RX ORDER — ESMOLOL HYDROCHLORIDE 10 MG/ML
INJECTION INTRAVENOUS AS NEEDED
Status: DISCONTINUED | OUTPATIENT
Start: 2017-01-26 | End: 2017-01-26 | Stop reason: SURG

## 2017-01-26 RX ORDER — PROMETHAZINE HYDROCHLORIDE 25 MG/ML
12.5 INJECTION, SOLUTION INTRAMUSCULAR; INTRAVENOUS EVERY 4 HOURS PRN
Status: DISCONTINUED | OUTPATIENT
Start: 2017-01-26 | End: 2017-01-28 | Stop reason: HOSPADM

## 2017-01-26 RX ORDER — NEOSTIGMINE METHYLSULFATE 1 MG/ML
INJECTION, SOLUTION INTRAVENOUS AS NEEDED
Status: DISCONTINUED | OUTPATIENT
Start: 2017-01-26 | End: 2017-01-26 | Stop reason: SURG

## 2017-01-26 RX ORDER — NALOXONE HCL 0.4 MG/ML
0.4 VIAL (ML) INJECTION
Status: DISCONTINUED | OUTPATIENT
Start: 2017-01-26 | End: 2017-01-26 | Stop reason: SDUPTHER

## 2017-01-26 RX ORDER — CLONIDINE HYDROCHLORIDE 0.1 MG/1
0.1 TABLET ORAL EVERY 6 HOURS PRN
Status: DISCONTINUED | OUTPATIENT
Start: 2017-01-26 | End: 2017-01-28 | Stop reason: HOSPADM

## 2017-01-26 RX ORDER — CYANOCOBALAMIN 1000 UG/ML
1000 INJECTION, SOLUTION INTRAMUSCULAR; SUBCUTANEOUS ONCE
Status: COMPLETED | OUTPATIENT
Start: 2017-01-27 | End: 2017-01-27

## 2017-01-26 RX ADMIN — BUPIVACAINE HYDROCHLORIDE 75 MG: 2.5 INJECTION, SOLUTION EPIDURAL; INFILTRATION; INTRACAUDAL; PERINEURAL at 10:52

## 2017-01-26 RX ADMIN — ACETAMINOPHEN 1000 MG: 10 INJECTION, SOLUTION INTRAVENOUS at 23:53

## 2017-01-26 RX ADMIN — SODIUM CHLORIDE, POTASSIUM CHLORIDE, SODIUM LACTATE AND CALCIUM CHLORIDE 150 ML/HR: 600; 310; 30; 20 INJECTION, SOLUTION INTRAVENOUS at 14:04

## 2017-01-26 RX ADMIN — ATRACURIUM BESYLATE 10 MG: 10 INJECTION, SOLUTION INTRAVENOUS at 11:32

## 2017-01-26 RX ADMIN — ROBINUL 0.2 MG: 0.2 INJECTION INTRAMUSCULAR; INTRAVENOUS at 13:27

## 2017-01-26 RX ADMIN — SODIUM CHLORIDE, POTASSIUM CHLORIDE, SODIUM LACTATE AND CALCIUM CHLORIDE 150 ML/HR: 600; 310; 30; 20 INJECTION, SOLUTION INTRAVENOUS at 08:38

## 2017-01-26 RX ADMIN — DEXAMETHASONE SODIUM PHOSPHATE 6 MG: 10 INJECTION, SOLUTION INTRAMUSCULAR; INTRAVENOUS at 11:08

## 2017-01-26 RX ADMIN — Medication 3 G: at 23:53

## 2017-01-26 RX ADMIN — NEOSTIGMINE METHYLSULFATE 3 MG: 1 INJECTION, SOLUTION INTRAVENOUS at 13:27

## 2017-01-26 RX ADMIN — PANTOPRAZOLE SODIUM 40 MG: 40 INJECTION, POWDER, FOR SOLUTION INTRAVENOUS at 08:13

## 2017-01-26 RX ADMIN — FENTANYL CITRATE 50 MCG: 50 INJECTION, SOLUTION INTRAMUSCULAR; INTRAVENOUS at 14:25

## 2017-01-26 RX ADMIN — ESMOLOL HYDROCHLORIDE 10 MG: 10 INJECTION, SOLUTION INTRAVENOUS at 13:08

## 2017-01-26 RX ADMIN — ATRACURIUM BESYLATE 10 MG: 10 INJECTION, SOLUTION INTRAVENOUS at 12:40

## 2017-01-26 RX ADMIN — SIMETHICONE CHEW TAB 80 MG 80 MG: 80 TABLET ORAL at 17:16

## 2017-01-26 RX ADMIN — SODIUM CHLORIDE, POTASSIUM CHLORIDE, SODIUM LACTATE AND CALCIUM CHLORIDE 1000 ML: 600; 310; 30; 20 INJECTION, SOLUTION INTRAVENOUS at 08:01

## 2017-01-26 RX ADMIN — PROMETHAZINE HYDROCHLORIDE 6.25 MG: 25 INJECTION INTRAMUSCULAR; INTRAVENOUS at 13:55

## 2017-01-26 RX ADMIN — SIMETHICONE CHEW TAB 80 MG 80 MG: 80 TABLET ORAL at 21:00

## 2017-01-26 RX ADMIN — DEXAMETHASONE SODIUM PHOSPHATE 2 MG: 10 INJECTION, SOLUTION INTRAMUSCULAR; INTRAVENOUS at 10:52

## 2017-01-26 RX ADMIN — Medication 3 G: at 16:09

## 2017-01-26 RX ADMIN — ONDANSETRON 4 MG: 2 INJECTION INTRAMUSCULAR; INTRAVENOUS at 13:27

## 2017-01-26 RX ADMIN — PROPOFOL 20 MCG/KG/MIN: 10 INJECTION, EMULSION INTRAVENOUS at 10:44

## 2017-01-26 RX ADMIN — LIDOCAINE HYDROCHLORIDE 0.2 ML: 10 INJECTION, SOLUTION EPIDURAL; INFILTRATION; INTRACAUDAL; PERINEURAL at 08:01

## 2017-01-26 RX ADMIN — PROPOFOL 100 MG: 10 INJECTION, EMULSION INTRAVENOUS at 10:49

## 2017-01-26 RX ADMIN — CHLORHEXIDINE GLUCONATE 60 ML: 1.2 RINSE ORAL at 08:14

## 2017-01-26 RX ADMIN — ACETAMINOPHEN 1000 MG: 10 INJECTION, SOLUTION INTRAVENOUS at 17:10

## 2017-01-26 RX ADMIN — ATRACURIUM BESYLATE 10 MG: 10 INJECTION, SOLUTION INTRAVENOUS at 12:04

## 2017-01-26 RX ADMIN — DEXAMETHASONE SODIUM PHOSPHATE 2 MG: 10 INJECTION, SOLUTION INTRAMUSCULAR; INTRAVENOUS at 10:49

## 2017-01-26 RX ADMIN — PROPOFOL 200 MG: 10 INJECTION, EMULSION INTRAVENOUS at 10:48

## 2017-01-26 RX ADMIN — ESMOLOL HYDROCHLORIDE 10 MG: 10 INJECTION, SOLUTION INTRAVENOUS at 11:08

## 2017-01-26 RX ADMIN — CEFAZOLIN SODIUM 2 G: 2 INJECTION, SOLUTION INTRAVENOUS at 10:43

## 2017-01-26 RX ADMIN — BUPRENORPHINE HYDROCHLORIDE 0.15 MG: 0.3 INJECTION INTRAMUSCULAR; INTRAVENOUS at 10:52

## 2017-01-26 RX ADMIN — FENTANYL CITRATE 50 MCG: 50 INJECTION, SOLUTION INTRAMUSCULAR; INTRAVENOUS at 13:57

## 2017-01-26 RX ADMIN — FENTANYL CITRATE 100 MCG: 50 INJECTION, SOLUTION INTRAMUSCULAR; INTRAVENOUS at 10:48

## 2017-01-26 RX ADMIN — ACETAMINOPHEN 1000 MG: 10 INJECTION, SOLUTION INTRAVENOUS at 11:35

## 2017-01-26 RX ADMIN — BUPIVACAINE HYDROCHLORIDE 75 MG: 2.5 INJECTION, SOLUTION EPIDURAL; INFILTRATION; INTRACAUDAL; PERINEURAL at 10:49

## 2017-01-26 RX ADMIN — BUPRENORPHINE HYDROCHLORIDE 0.15 MG: 0.3 INJECTION INTRAMUSCULAR; INTRAVENOUS at 10:49

## 2017-01-26 RX ADMIN — ATRACURIUM BESYLATE 50 MG: 10 INJECTION, SOLUTION INTRAVENOUS at 10:48

## 2017-01-26 RX ADMIN — MORPHINE SULFATE 4 MG: 4 INJECTION, SOLUTION INTRAMUSCULAR; INTRAVENOUS at 21:04

## 2017-01-26 RX ADMIN — LIDOCAINE HYDROCHLORIDE 50 MG: 10 INJECTION, SOLUTION EPIDURAL; INFILTRATION; INTRACAUDAL; PERINEURAL at 10:48

## 2017-01-26 RX ADMIN — SCOPALAMINE 1 PATCH: 1 PATCH, EXTENDED RELEASE TRANSDERMAL at 08:13

## 2017-01-26 NOTE — ANESTHESIA PROCEDURE NOTES
Airway  Urgency: elective    Date/Time: 1/26/2017 10:46 AM  End Time:1/26/2017 10:51 AM  Airway not difficult    General Information and Staff    Patient location during procedure: OR  Anesthesiologist: BETINA QIU  CRNA: ELIESER SUTTON    Indications and Patient Condition  Indications for airway management: airway protection    Preoxygenated: yes  MILS not maintained throughout  Mask difficulty assessment: 1 - vent by mask    Final Airway Details  Final airway type: endotracheal airway      Successful airway: ETT  Cuffed: yes   Successful intubation technique: direct laryngoscopy  Endotracheal tube insertion site: oral  Blade: Farzana  Blade size: #3  ETT size: 7.0 mm  Cormack-Lehane Classification: grade I - full view of glottis  Placement verified by: chest auscultation and capnometry   Inital cuff pressure (cm H2O): 20  Cuff volume (mL): 6  Measured from: lips  ETT to lips (cm): 20  Number of attempts at approach: 1    Additional Comments  Negative epigastric sounds, Breath sound equal bilaterally with symmetric chest rise and fall

## 2017-01-26 NOTE — ANESTHESIA POSTPROCEDURE EVALUATION
Patient: Laure YADAV Barbie    Procedure Summary     Date Anesthesia Start Anesthesia Stop Room / Location    01/26/17 1043   [facility] OR 02 /  [facility]       Procedure Diagnosis Surgeon Provider    GASTRIC SLEEVE LAPAROSCOPIC (N/A Abdomen); HIATAL HERNIA REPAIR LAPAROSCOPIC (N/A Abdomen) Obesity, Class III, BMI 40-49.9 (morbid obesity); Hiatal hernia  (Obesity, Class III, BMI 40-49.9 (morbid obesity) [E66.01]) MD Jagdish Serrato MD          Anesthesia Type: general  Last vitals  BP   132/72   Temp   97.   Pulse  100   Resp   20   SpO2   97%     Anesthesia Post [id number]

## 2017-01-27 ENCOUNTER — APPOINTMENT (OUTPATIENT)
Dept: GENERAL RADIOLOGY | Facility: HOSPITAL | Age: 47
End: 2017-01-27
Attending: SURGERY

## 2017-01-27 LAB
ALBUMIN SERPL-MCNC: 3.8 G/DL (ref 3.2–4.8)
ALBUMIN/GLOB SERPL: 1.3 G/DL (ref 1.5–2.5)
ALP SERPL-CCNC: 49 U/L (ref 25–100)
ALT SERPL W P-5'-P-CCNC: 36 U/L (ref 7–40)
ANION GAP SERPL CALCULATED.3IONS-SCNC: 9 MMOL/L (ref 3–11)
AST SERPL-CCNC: 42 U/L (ref 0–33)
BASOPHILS # BLD AUTO: 0.01 10*3/MM3 (ref 0–0.2)
BASOPHILS # BLD AUTO: 0.03 10*3/MM3 (ref 0–0.2)
BASOPHILS NFR BLD AUTO: 0.1 % (ref 0–1)
BASOPHILS NFR BLD AUTO: 0.2 % (ref 0–1)
BILIRUB SERPL-MCNC: 0.4 MG/DL (ref 0.3–1.2)
BUN BLD-MCNC: 8 MG/DL (ref 9–23)
BUN/CREAT SERPL: 13.3 (ref 7–25)
CALCIUM SPEC-SCNC: 9.1 MG/DL (ref 8.7–10.4)
CHLORIDE SERPL-SCNC: 104 MMOL/L (ref 99–109)
CO2 SERPL-SCNC: 25 MMOL/L (ref 20–31)
CREAT BLD-MCNC: 0.6 MG/DL (ref 0.6–1.3)
CYTO UR: NORMAL
DEPRECATED RDW RBC AUTO: 51.8 FL (ref 37–54)
DEPRECATED RDW RBC AUTO: 51.9 FL (ref 37–54)
EOSINOPHIL # BLD AUTO: 0 10*3/MM3 (ref 0.1–0.3)
EOSINOPHIL # BLD AUTO: 0 10*3/MM3 (ref 0.1–0.3)
EOSINOPHIL NFR BLD AUTO: 0 % (ref 0–3)
EOSINOPHIL NFR BLD AUTO: 0 % (ref 0–3)
ERYTHROCYTE [DISTWIDTH] IN BLOOD BY AUTOMATED COUNT: 18.2 % (ref 11.3–14.5)
ERYTHROCYTE [DISTWIDTH] IN BLOOD BY AUTOMATED COUNT: 18.3 % (ref 11.3–14.5)
GFR SERPL CREATININE-BSD FRML MDRD: 131 ML/MIN/1.73
GLOBULIN UR ELPH-MCNC: 2.9 GM/DL
GLUCOSE BLD-MCNC: 128 MG/DL (ref 70–100)
HCT VFR BLD AUTO: 31.3 % (ref 34.5–44)
HCT VFR BLD AUTO: 32.2 % (ref 34.5–44)
HGB BLD-MCNC: 10.2 G/DL (ref 11.5–15.5)
HGB BLD-MCNC: 9.7 G/DL (ref 11.5–15.5)
IMM GRANULOCYTES # BLD: 0.05 10*3/MM3 (ref 0–0.03)
IMM GRANULOCYTES # BLD: 0.08 10*3/MM3 (ref 0–0.03)
IMM GRANULOCYTES NFR BLD: 0.3 % (ref 0–0.6)
IMM GRANULOCYTES NFR BLD: 0.4 % (ref 0–0.6)
IRON 24H UR-MRATE: 25 MCG/DL (ref 50–175)
LAB AP CASE REPORT: NORMAL
LAB AP CLINICAL INFORMATION: NORMAL
LYMPHOCYTES # BLD AUTO: 1.23 10*3/MM3 (ref 0.6–4.8)
LYMPHOCYTES # BLD AUTO: 1.96 10*3/MM3 (ref 0.6–4.8)
LYMPHOCYTES NFR BLD AUTO: 10.3 % (ref 24–44)
LYMPHOCYTES NFR BLD AUTO: 7.4 % (ref 24–44)
Lab: NORMAL
MCH RBC QN AUTO: 24.2 PG (ref 27–31)
MCH RBC QN AUTO: 24.6 PG (ref 27–31)
MCHC RBC AUTO-ENTMCNC: 31 G/DL (ref 32–36)
MCHC RBC AUTO-ENTMCNC: 31.7 G/DL (ref 32–36)
MCV RBC AUTO: 77.6 FL (ref 80–99)
MCV RBC AUTO: 78.1 FL (ref 80–99)
MONOCYTES # BLD AUTO: 0.91 10*3/MM3 (ref 0–1)
MONOCYTES # BLD AUTO: 1.22 10*3/MM3 (ref 0–1)
MONOCYTES NFR BLD AUTO: 5.5 % (ref 0–12)
MONOCYTES NFR BLD AUTO: 6.4 % (ref 0–12)
NEUTROPHILS # BLD AUTO: 14.48 10*3/MM3 (ref 1.5–8.3)
NEUTROPHILS # BLD AUTO: 15.73 10*3/MM3 (ref 1.5–8.3)
NEUTROPHILS NFR BLD AUTO: 82.7 % (ref 41–71)
NEUTROPHILS NFR BLD AUTO: 86.7 % (ref 41–71)
PATH REPORT.FINAL DX SPEC: NORMAL
PATH REPORT.GROSS SPEC: NORMAL
PLATELET # BLD AUTO: 387 10*3/MM3 (ref 150–450)
PLATELET # BLD AUTO: 443 10*3/MM3 (ref 150–450)
PMV BLD AUTO: 10.1 FL (ref 6–12)
PMV BLD AUTO: 10.4 FL (ref 6–12)
POTASSIUM BLD-SCNC: 4.7 MMOL/L (ref 3.5–5.5)
PROT SERPL-MCNC: 6.7 G/DL (ref 5.7–8.2)
RBC # BLD AUTO: 4.01 10*6/MM3 (ref 3.89–5.14)
RBC # BLD AUTO: 4.15 10*6/MM3 (ref 3.89–5.14)
SODIUM BLD-SCNC: 138 MMOL/L (ref 132–146)
WBC NRBC COR # BLD: 16.68 10*3/MM3 (ref 3.5–10.8)
WBC NRBC COR # BLD: 19.02 10*3/MM3 (ref 3.5–10.8)

## 2017-01-27 PROCEDURE — 25010000002 PROMETHAZINE PER 50 MG: Performed by: SURGERY

## 2017-01-27 PROCEDURE — 99024 POSTOP FOLLOW-UP VISIT: CPT | Performed by: SURGERY

## 2017-01-27 PROCEDURE — 25010000002 NA FERRIC GLUC CPLX PER 12.5 MG: Performed by: SURGERY

## 2017-01-27 PROCEDURE — 83540 ASSAY OF IRON: CPT | Performed by: SURGERY

## 2017-01-27 PROCEDURE — 25010000002 ENOXAPARIN PER 10 MG: Performed by: SURGERY

## 2017-01-27 PROCEDURE — 25010000002 CYANOCOBALAMIN PER 1000 MCG: Performed by: SURGERY

## 2017-01-27 PROCEDURE — 85025 COMPLETE CBC W/AUTO DIFF WBC: CPT | Performed by: SURGERY

## 2017-01-27 PROCEDURE — 74241: CPT

## 2017-01-27 PROCEDURE — 25810000003 POTASSIUM CHLORIDE PER 2 MEQ: Performed by: SURGERY

## 2017-01-27 PROCEDURE — 80053 COMPREHEN METABOLIC PANEL: CPT | Performed by: SURGERY

## 2017-01-27 PROCEDURE — 25010000002 PYRIDOXINE PER 100 MG: Performed by: SURGERY

## 2017-01-27 PROCEDURE — 25010000002 THIAMINE PER 100 MG: Performed by: SURGERY

## 2017-01-27 RX ADMIN — THIAMINE HYDROCHLORIDE 250 ML/HR: 100 INJECTION, SOLUTION INTRAMUSCULAR; INTRAVENOUS at 08:09

## 2017-01-27 RX ADMIN — CYANOCOBALAMIN 1000 MCG: 1000 INJECTION, SOLUTION INTRAMUSCULAR; SUBCUTANEOUS at 08:09

## 2017-01-27 RX ADMIN — HYDROMORPHONE HYDROCHLORIDE 2 MG: 2 TABLET ORAL at 08:14

## 2017-01-27 RX ADMIN — POTASSIUM CHLORIDE AND SODIUM CHLORIDE 125 ML/HR: 450; 150 INJECTION, SOLUTION INTRAVENOUS at 12:39

## 2017-01-27 RX ADMIN — ENOXAPARIN SODIUM 40 MG: 40 INJECTION SUBCUTANEOUS at 08:08

## 2017-01-27 RX ADMIN — HYDROMORPHONE HYDROCHLORIDE 2 MG: 2 TABLET ORAL at 12:40

## 2017-01-27 RX ADMIN — HYDROMORPHONE HYDROCHLORIDE 2 MG: 2 TABLET ORAL at 19:56

## 2017-01-27 RX ADMIN — POTASSIUM CHLORIDE AND SODIUM CHLORIDE 125 ML/HR: 450; 150 INJECTION, SOLUTION INTRAVENOUS at 22:33

## 2017-01-27 RX ADMIN — PANTOPRAZOLE SODIUM 40 MG: 40 INJECTION, POWDER, FOR SOLUTION INTRAVENOUS at 06:00

## 2017-01-27 RX ADMIN — SODIUM CHLORIDE 125 MG: 9 INJECTION, SOLUTION INTRAVENOUS at 12:39

## 2017-01-27 RX ADMIN — PROMETHAZINE HYDROCHLORIDE 12.5 MG: 25 INJECTION INTRAMUSCULAR; INTRAVENOUS at 08:08

## 2017-01-27 RX ADMIN — ACETAMINOPHEN 1000 MG: 10 INJECTION, SOLUTION INTRAVENOUS at 06:00

## 2017-01-28 VITALS
WEIGHT: 256 LBS | SYSTOLIC BLOOD PRESSURE: 120 MMHG | RESPIRATION RATE: 18 BRPM | DIASTOLIC BLOOD PRESSURE: 73 MMHG | TEMPERATURE: 98.1 F | BODY MASS INDEX: 43.71 KG/M2 | HEART RATE: 94 BPM | OXYGEN SATURATION: 97 % | HEIGHT: 64 IN

## 2017-01-28 LAB
ALBUMIN SERPL-MCNC: 3.6 G/DL (ref 3.2–4.8)
ALBUMIN/GLOB SERPL: 1.2 G/DL (ref 1.5–2.5)
ALP SERPL-CCNC: 46 U/L (ref 25–100)
ALT SERPL W P-5'-P-CCNC: 32 U/L (ref 7–40)
ANION GAP SERPL CALCULATED.3IONS-SCNC: 5 MMOL/L (ref 3–11)
AST SERPL-CCNC: 31 U/L (ref 0–33)
BASOPHILS # BLD AUTO: 0.02 10*3/MM3 (ref 0–0.2)
BASOPHILS NFR BLD AUTO: 0.1 % (ref 0–1)
BILIRUB SERPL-MCNC: 0.4 MG/DL (ref 0.3–1.2)
BUN BLD-MCNC: 8 MG/DL (ref 9–23)
BUN/CREAT SERPL: 13.3 (ref 7–25)
CALCIUM SPEC-SCNC: 8.7 MG/DL (ref 8.7–10.4)
CHLORIDE SERPL-SCNC: 106 MMOL/L (ref 99–109)
CO2 SERPL-SCNC: 28 MMOL/L (ref 20–31)
CREAT BLD-MCNC: 0.6 MG/DL (ref 0.6–1.3)
DEPRECATED RDW RBC AUTO: 53.8 FL (ref 37–54)
EOSINOPHIL # BLD AUTO: 0.02 10*3/MM3 (ref 0.1–0.3)
EOSINOPHIL NFR BLD AUTO: 0.1 % (ref 0–3)
ERYTHROCYTE [DISTWIDTH] IN BLOOD BY AUTOMATED COUNT: 18.6 % (ref 11.3–14.5)
GFR SERPL CREATININE-BSD FRML MDRD: 131 ML/MIN/1.73
GLOBULIN UR ELPH-MCNC: 3 GM/DL
GLUCOSE BLD-MCNC: 83 MG/DL (ref 70–100)
HCT VFR BLD AUTO: 30.8 % (ref 34.5–44)
HGB BLD-MCNC: 9.7 G/DL (ref 11.5–15.5)
IMM GRANULOCYTES # BLD: 0.04 10*3/MM3 (ref 0–0.03)
IMM GRANULOCYTES NFR BLD: 0.3 % (ref 0–0.6)
LYMPHOCYTES # BLD AUTO: 2.91 10*3/MM3 (ref 0.6–4.8)
LYMPHOCYTES NFR BLD AUTO: 18.7 % (ref 24–44)
MCH RBC QN AUTO: 24.9 PG (ref 27–31)
MCHC RBC AUTO-ENTMCNC: 31.5 G/DL (ref 32–36)
MCV RBC AUTO: 79.2 FL (ref 80–99)
MONOCYTES # BLD AUTO: 0.9 10*3/MM3 (ref 0–1)
MONOCYTES NFR BLD AUTO: 5.8 % (ref 0–12)
NEUTROPHILS # BLD AUTO: 11.68 10*3/MM3 (ref 1.5–8.3)
NEUTROPHILS NFR BLD AUTO: 75 % (ref 41–71)
PLATELET # BLD AUTO: 412 10*3/MM3 (ref 150–450)
PMV BLD AUTO: 10.2 FL (ref 6–12)
POTASSIUM BLD-SCNC: 4.1 MMOL/L (ref 3.5–5.5)
PROT SERPL-MCNC: 6.6 G/DL (ref 5.7–8.2)
RBC # BLD AUTO: 3.89 10*6/MM3 (ref 3.89–5.14)
SODIUM BLD-SCNC: 139 MMOL/L (ref 132–146)
WBC NRBC COR # BLD: 15.57 10*3/MM3 (ref 3.5–10.8)

## 2017-01-28 PROCEDURE — 85025 COMPLETE CBC W/AUTO DIFF WBC: CPT | Performed by: SURGERY

## 2017-01-28 PROCEDURE — 99024 POSTOP FOLLOW-UP VISIT: CPT | Performed by: SURGERY

## 2017-01-28 PROCEDURE — 25010000002 ENOXAPARIN PER 10 MG: Performed by: SURGERY

## 2017-01-28 PROCEDURE — 80053 COMPREHEN METABOLIC PANEL: CPT | Performed by: SURGERY

## 2017-01-28 PROCEDURE — 25810000003 POTASSIUM CHLORIDE PER 2 MEQ: Performed by: SURGERY

## 2017-01-28 RX ORDER — ONDANSETRON 4 MG/1
4 TABLET, ORALLY DISINTEGRATING ORAL EVERY 8 HOURS PRN
Qty: 20 TABLET | Refills: 0 | Status: SHIPPED | OUTPATIENT
Start: 2017-01-28 | End: 2017-02-24

## 2017-01-28 RX ORDER — OMEPRAZOLE 20 MG/1
20 CAPSULE, DELAYED RELEASE ORAL DAILY
Qty: 60 CAPSULE | Refills: 1 | Status: SHIPPED | OUTPATIENT
Start: 2017-01-28 | End: 2017-02-24

## 2017-01-28 RX ORDER — PROMETHAZINE HYDROCHLORIDE 12.5 MG/1
12.5 TABLET ORAL EVERY 6 HOURS PRN
Qty: 20 TABLET | Refills: 0 | Status: SHIPPED | OUTPATIENT
Start: 2017-01-28 | End: 2017-02-24

## 2017-01-28 RX ORDER — HYDROCODONE BITARTRATE AND ACETAMINOPHEN 7.5; 325 MG/1; MG/1
1 TABLET ORAL EVERY 6 HOURS PRN
Qty: 30 TABLET | Refills: 0 | Status: SHIPPED | OUTPATIENT
Start: 2017-01-28 | End: 2017-07-21

## 2017-01-28 RX ADMIN — HYDROCODONE BITARTRATE AND ACETAMINOPHEN 1 TABLET: 7.5; 325 TABLET ORAL at 13:02

## 2017-01-28 RX ADMIN — ENOXAPARIN SODIUM 40 MG: 40 INJECTION SUBCUTANEOUS at 08:09

## 2017-01-28 RX ADMIN — CLONIDINE HYDROCHLORIDE 0.1 MG: 0.1 TABLET ORAL at 03:53

## 2017-01-28 RX ADMIN — HYDROMORPHONE HYDROCHLORIDE 2 MG: 2 TABLET ORAL at 03:53

## 2017-01-28 RX ADMIN — PANTOPRAZOLE SODIUM 40 MG: 40 INJECTION, POWDER, FOR SOLUTION INTRAVENOUS at 06:16

## 2017-01-28 RX ADMIN — ONDANSETRON 4 MG: 4 TABLET, FILM COATED ORAL at 08:09

## 2017-01-28 RX ADMIN — POTASSIUM CHLORIDE AND SODIUM CHLORIDE 125 ML/HR: 450; 150 INJECTION, SOLUTION INTRAVENOUS at 06:16

## 2017-02-03 ENCOUNTER — OFFICE VISIT (OUTPATIENT)
Dept: BARIATRICS/WEIGHT MGMT | Facility: CLINIC | Age: 47
End: 2017-02-03

## 2017-02-03 VITALS
OXYGEN SATURATION: 99 % | HEIGHT: 64 IN | HEART RATE: 91 BPM | RESPIRATION RATE: 18 BRPM | DIASTOLIC BLOOD PRESSURE: 72 MMHG | SYSTOLIC BLOOD PRESSURE: 100 MMHG | TEMPERATURE: 97.7 F | WEIGHT: 239.5 LBS | BODY MASS INDEX: 40.89 KG/M2

## 2017-02-03 DIAGNOSIS — Z98.84 STATUS POST BARIATRIC SURGERY: Primary | ICD-10-CM

## 2017-02-03 DIAGNOSIS — E66.01 OBESITY, CLASS III, BMI 40-49.9 (MORBID OBESITY) (HCC): ICD-10-CM

## 2017-02-03 DIAGNOSIS — E55.9 VITAMIN D DEFICIENCY: ICD-10-CM

## 2017-02-03 DIAGNOSIS — D50.9 MICROCYTIC ANEMIA: ICD-10-CM

## 2017-02-03 DIAGNOSIS — E78.00 PURE HYPERCHOLESTEROLEMIA: ICD-10-CM

## 2017-02-03 PROCEDURE — 99024 POSTOP FOLLOW-UP VISIT: CPT | Performed by: PHYSICIAN ASSISTANT

## 2017-02-03 RX ORDER — URSODIOL 300 MG/1
300 CAPSULE ORAL 2 TIMES DAILY
Qty: 60 CAPSULE | Refills: 5 | Status: SHIPPED | OUTPATIENT
Start: 2017-02-03 | End: 2017-04-08

## 2017-02-03 NOTE — PROGRESS NOTES
[facility] BARIATRIC SURGERY  [address]  [facility] [address]  [phone]    Laure Valentin.  1970    DATE OF VISIT:   2/3/2017    REASON FOR VISIT: 1 week post-op        HPI:  Laure Valentin is a 46 y.o. female. 1 week s/p LSG/HHR by Dr. Dick on 1/26/17. Overall feeling good. She reports diarrhea when she drinks protein shots. she reports sore around main incision, no pain. Denies fever, discharge. Mild dysphagia when she drinks cold fluids but o/w tolerating stage 1 diet w/out issue.  She states that she is eating 60-70 grams of protein per day. She reports drinking 64 oz. of water per day, no carbonated beverage consumption. she is taking Omeprazole  and needs rx for Actigall. Taking MVI, B12, B1, Vit D and iron. Ambulating often. Pre-surgery weight:256 lbs. Today's weight is 239 lb 8 oz (109 kg) pounds, today's BMI is Body mass index is 41.11 kg/(m^2)., and her weight loss since surgery is 17 pounds.       Past Medical History   Diagnosis Date   • GERD (gastroesophageal reflux disease)    • Heart murmur    • Hyperlipidemia    • Joint pain    • Microcytic anemia      10.2/32.2 on iron, up from 9.4/32.4 on intake labs   • Morbid obesity    • Nephrolithiasis    • Thrombocytosis      481K   • Wears contact lenses    • Wears eyeglasses      Past Surgical History   Procedure Laterality Date   • Laparoscopic tubal ligation  2002   • Dilatation and curettage  1996   • Colonoscopy       8 years ago   • Pr lap,esophagogast fundoplasty N/A 1/26/2017     Procedure: HIATAL HERNIA REPAIR LAPAROSCOPIC;  Surgeon: Eulalio Dick MD;  Location:  [facility];  Service: Bariatric   • Gastric sleeve laparoscopic N/A 1/26/2017     Procedure: GASTRIC SLEEVE LAPAROSCOPIC;  Surgeon: Eulalio Dick MD;  Location:  [facility];  Service:        Current Outpatient Prescriptions:   •  Calcium-Magnesium-Vitamin D (CALCIUM 500 PO), Take 1 tablet by mouth Daily., Disp: , Rfl:   •   Cholecalciferol (VITAMIN D PO), Take 1 capsule by mouth Every Night., Disp: , Rfl:   •  ferrous sulfate 324 (65 FE) MG tablet delayed-release EC tablet, Take 324 mg by mouth Daily With Breakfast., Disp: , Rfl:   •  HYDROcodone-acetaminophen (NORCO) 7.5-325 MG per tablet, Take 1 tablet by mouth Every 6 (Six) Hours As Needed for moderate pain (4-6)., Disp: 30 tablet, Rfl: 0  •  Multiple Vitamins-Minerals (MULTIVITAMIN ADULT PO), Take 1 tablet by mouth Daily., Disp: , Rfl:   •  omeprazole (PRILOSEC) 20 MG capsule, Take 1 capsule by mouth Daily., Disp: 60 capsule, Rfl: 1  •  ondansetron ODT (ZOFRAN ODT) 4 MG disintegrating tablet, Take 1 tablet by mouth Every 8 (Eight) Hours As Needed for nausea or vomiting., Disp: 20 tablet, Rfl: 0  •  promethazine (PHENERGAN) 12.5 MG tablet, Take 1 tablet by mouth Every 6 (Six) Hours As Needed for nausea or vomiting., Disp: 20 tablet, Rfl: 0  •  ursodiol (ACTIGALL) 300 MG capsule, Take 1 capsule by mouth 2 (Two) Times a Day for 30 days. Take twice daily x 6 months, Disp: 60 capsule, Rfl: 5  •  vitamin B-12 (CYANOCOBALAMIN) 1000 MCG tablet, Take 1,000 mcg by mouth Daily., Disp: , Rfl:   •  vitamin C (ASCORBIC ACID) 500 MG tablet, Take 500 mg by mouth Daily., Disp: , Rfl:   No Known Allergies  Social History     Social History   • Marital status:      Spouse name: N/A   • Number of children: N/A   • Years of education: N/A     Occupational History   • Not on file.     Social History Main Topics   • Smoking status: Never Smoker   • Smokeless tobacco: Never Used   • Alcohol use No   • Drug use: No   • Sexual activity: Defer     Other Topics Concern   • Not on file     Social History Narrative     Review of Systems   General ROS: Positive for - fatigue  Ophthalmic ROS: Negative for - vision changes  ENT ROS: Positivefor - swallowing difficulty  Respiratory ROS: no cough, shortness of breath, or wheezing  Cardiovascular ROS: no chest pain or dyspnea on exertion  Gastrointestinal ROS:  "Negative for - abdominal pain, constipation, diarrhea, heartburn/reflux, nausea/vomiting  Neurological ROS: Negative for -  numbness/tingling of extremities, memory loss, dizziness  Dermatological ROS: Negative for - Hair loss     Physical Exam:  Visit Vitals   • /72 (BP Location: Left arm, Patient Position: Sitting, Cuff Size: Large Adult)   • Pulse 91   • Temp 97.7 °F (36.5 °C) (Temporal Artery )   • Resp 18   • Ht 64\" (162.6 cm)   • Wt 239 lb 8 oz (109 kg)   • LMP 01/05/2017 (Approximate)   • SpO2 99%   • BMI 41.11 kg/m2        General Appearance:  Well nourished.  In no acute distress.  Patient was observed to be obese.  Oral Cavity:   Buccal Mucosa: The buccal mucosa was moist.  Lungs:  Normal breath sounds/voice sounds.  Cardiovascular:   Heart Rate And Rhythm: Heart rate and rhythm normal.   Edema: No calf tenderness.  Abdomen:  Abdomen:incisions healing well.   Auscultation: The bowel sounds were normal.   Palpation: No mass was palpated in the abdomen, Soft, nontender, and nondistended.   Hernia: No hernia was discovered.  Musculoskeletal System:   General/bilateral: No edema present in extremities.  Normal movement of all extremities.  Neurological:  Was alert and oriented.   Gait And Stance: Gait and stance were normal.  Psychiatric:   Attitude: The attitude was cooperative.   Mood: Mood pleasant.  Skin:  The complexion was normal.  The skin moisture was normal and the skin temperature was normal.    Assessment:   1 week s/p LSG/HHR,  the patient is doing well.     ICD-10-CM ICD-9-CM   1. Status post bariatric surgery Z98.84 V45.86   2. Obesity, Class III, BMI 40-49.9 (morbid obesity) E66.01 278.01   3. Vitamin D deficiency E55.9 268.9   4. Microcytic anemia D50.9 280.9   5. Pure hypercholesterolemia E78.00 272.0       Plan:      Requested Prescriptions     Signed Prescriptions Disp Refills   • ursodiol (ACTIGALL) 300 MG capsule 60 capsule 5     Sig: Take 1 capsule by mouth 2 (Two) Times a Day for " 30 days. Take twice daily x 6 months   · Recommended a supportive garment for abdomen. Call if sx persist/worsen.   · Avoid protein shots, may drink protein shakes.   · Discussed the importance of adequate protein/fluid intake  Activity restrictions: no lifting, pushing or pulling over 25lbs for 3 weeks.   Recommended patient be sure to get at least 70g protein per day, preferably > 100 gams of protein per day. Discussed with the patient the recommended amount of water per day to intake. Reviewed vitamin requirements. Be sure to do routine exercise and increase activity as tolerated. Advance diet per the manual. No ASA, NSAIDs, or steroids for 6 weeks.    • The patient was counseled regarding post op bariatric manual  • Instructed to call the office for concerns, questions, or problems.   •  The patient was instructed to follow up in 3 weeks.   note: approx 15 of the 25 minute visit was spent counseling on dietary/lifestyle modifications      Angelina Platt, PAC

## 2017-02-09 ENCOUNTER — TELEPHONE (OUTPATIENT)
Dept: BARIATRICS/WEIGHT MGMT | Facility: CLINIC | Age: 47
End: 2017-02-09

## 2017-02-09 NOTE — TELEPHONE ENCOUNTER
Contacted pt to change the dose on her Omeprazole.  Also to stay on a stage 2 diet and discussed the protein amount as well.  Notified pt that if symptoms worsen to schedule a f/up appt.  Pt verbalized understanding.

## 2017-02-09 NOTE — TELEPHONE ENCOUNTER
Pt called in starting that she is having trouble swallowing.  Pt states she feels she is not eating too fast or large bites.  She was wanting to know if there was any advise we have to help her.  Please advise.  Thank you!

## 2017-02-24 ENCOUNTER — OFFICE VISIT (OUTPATIENT)
Dept: BARIATRICS/WEIGHT MGMT | Facility: CLINIC | Age: 47
End: 2017-02-24

## 2017-02-24 VITALS
BODY MASS INDEX: 40.12 KG/M2 | RESPIRATION RATE: 18 BRPM | TEMPERATURE: 97.1 F | SYSTOLIC BLOOD PRESSURE: 111 MMHG | WEIGHT: 235 LBS | OXYGEN SATURATION: 99 % | HEART RATE: 74 BPM | DIASTOLIC BLOOD PRESSURE: 74 MMHG | HEIGHT: 64 IN

## 2017-02-24 DIAGNOSIS — Z98.84 STATUS POST BARIATRIC SURGERY: Primary | ICD-10-CM

## 2017-02-24 DIAGNOSIS — E66.01 MORBID OBESITY DUE TO EXCESS CALORIES (HCC): ICD-10-CM

## 2017-02-24 DIAGNOSIS — E78.00 PURE HYPERCHOLESTEROLEMIA: ICD-10-CM

## 2017-02-24 DIAGNOSIS — D75.839 THROMBOCYTOSIS: ICD-10-CM

## 2017-02-24 DIAGNOSIS — D50.9 MICROCYTIC ANEMIA: ICD-10-CM

## 2017-02-24 PROCEDURE — 99024 POSTOP FOLLOW-UP VISIT: CPT | Performed by: PHYSICIAN ASSISTANT

## 2017-02-24 RX ORDER — OMEPRAZOLE 40 MG/1
40 CAPSULE, DELAYED RELEASE ORAL DAILY
Qty: 30 CAPSULE | Refills: 3 | Status: SHIPPED | OUTPATIENT
Start: 2017-02-24 | End: 2017-06-04

## 2017-02-24 NOTE — PROGRESS NOTES
[facility] BARIATRIC SURGERY  [address]  [facility] [address]  [phone]    Laure Valentin.  1970      REASON FOR VISIT: 1 month postop    HPI:  Laure Valentin is a 46 y.o. female 1 month s/p LSG/HHR by Dr. Dick on 1/26/17.  Overall feeling good. Reports dysphagia when she eat solids or drink cold drinks, can tolerate creamy soups. She's taking Omeprazole bid and this has helped some. Laure states that  she is eating 40-50 grams of protein per day. She reports eating small frequent meals.  Drinking 4 -5 bottles of water per day, no carbonated beverage consumption and is starting to do more exercises  regularly. Laure is taking Omeprazole  and Actigall .   Taking  MVI, B12, B1, Vit D and iron. Pre-surgery weight: 256 lbs. Today's weight is 235 lb (107 kg) pounds, today's BMI is Body mass index is 40.34 kg/(m^2)., she has a  loss of 4 pounds since the last visit and her weight loss since surgery is 21 pounds.     Past Medical History   Diagnosis Date   • GERD (gastroesophageal reflux disease)    • Heart murmur    • Heart murmur    • Heartburn      tums, otc prilosec prn.  spicy foods. maybe once/wk.     • Hyperlipidemia      no meds   • Joint pain    • Microcytic anemia      10.2/32.2 on iron, up from 9.4/32.4 on intake labs   • Morbid obesity    • Nephrolithiasis      8 yrs ago, passed on its own.    • Pregnancy      x 4, MC x 2 (8wks, 5 mos).    • Thrombocytosis      481K   • Wears contact lenses    • Wears eyeglasses      Past Surgical History   Procedure Laterality Date   • Laparoscopic tubal ligation  2002   • Dilatation and curettage  1996   • Colonoscopy       8 years ago   • Pr lap,esophagogast fundoplasty N/A 1/26/2017     Procedure: HIATAL HERNIA REPAIR LAPAROSCOPIC;  Surgeon: Eulalio Dick MD;  Location: [facility];  Service: Bariatric   • Gastric sleeve laparoscopic N/A 1/26/2017     Procedure: GASTRIC SLEEVE LAPAROSCOPIC;  Surgeon: Eulalio Dick  MD;  Location: [facility];  Service:    • [place] tooth extraction     • Tubal abdominal ligation  2002       Current Outpatient Prescriptions:   •  Calcium-Magnesium-Vitamin D (CALCIUM 500 PO), Take 1 tablet by mouth Daily., Disp: , Rfl:   •  Cholecalciferol (VITAMIN D PO), Take 1 capsule by mouth Every Night., Disp: , Rfl:   •  ferrous sulfate 324 (65 FE) MG tablet delayed-release EC tablet, Take 324 mg by mouth Daily With Breakfast., Disp: , Rfl:   •  Multiple Vitamins-Minerals (MULTIVITAMIN ADULT PO), Take 1 tablet by mouth Daily., Disp: , Rfl:   •  ursodiol (ACTIGALL) 300 MG capsule, Take 1 capsule by mouth 2 (Two) Times a Day for 30 days. Take twice daily x 6 months, Disp: 60 capsule, Rfl: 5  •  vitamin B-12 (CYANOCOBALAMIN) 1000 MCG tablet, Take 1,000 mcg by mouth Daily., Disp: , Rfl:   •  vitamin C (ASCORBIC ACID) 500 MG tablet, Take 500 mg by mouth Daily., Disp: , Rfl:   •  HYDROcodone-acetaminophen (NORCO) 7.5-325 MG per tablet, Take 1 tablet by mouth Every 6 (Six) Hours As Needed for moderate pain (4-6)., Disp: 30 tablet, Rfl: 0  •  omeprazole (priLOSEC) 40 MG capsule, Take 1 capsule by mouth Daily for 90 days., Disp: 30 capsule, Rfl: 3  No Known Allergies  Social History     Social History   • Marital status:      Spouse name: N/A   • Number of children: N/A   • Years of education: N/A     Occupational History   • Not on file.     Social History Main Topics   • Smoking status: Never Smoker   • Smokeless tobacco: Never Used   • Alcohol use No   • Drug use: No   • Sexual activity: Defer     Other Topics Concern   • Not on file     Social History Narrative     Review of Systems   General ROS: Positive for - fatigue  Ophthalmic ROS: Negative for - vision changes  ENT ROS: Positive for - swallowing difficulty  Gastrointestinal ROS: Negative for - abdominal pain, diarrhea, heartburn/reflux, nausea/vomiting  Positive for constipation  Neurological ROS: Negative for - dizziness, numbness/tingling of  "extremities, memory loss  Dermatological ROS: Negative for - Hair loss     Physical Exam:  Visit Vitals   • /74 (BP Location: Left arm, Patient Position: Sitting, Cuff Size: Large Adult)   • Pulse 74   • Temp 97.1 °F (36.2 °C)   • Resp 18   • Ht 64\" (162.6 cm)   • Wt 235 lb (107 kg)   • LMP 01/05/2017 (Approximate)   • SpO2 99%   • BMI 40.34 kg/m2     General Appearance:  Well nourished.  In no acute distress.  Patient was observed to be obese.  Oral Cavity:   Buccal Mucosa: The buccal mucosa was moist.  Lungs:  Normal breath sounds/voice sounds.  Cardiovascular:   Heart Rate And Rhythm: Heart rate and rhythm normal.   Edema: No calf tenderness.  Abdomen:  Abdomen:incisions healing well.   Auscultation: The bowel sounds were normal.   Palpation: No mass was palpated in the abdomen, Soft, nontender, and nondistended.   Hernia: No hernia was discovered.  Musculoskeletal System:   General/bilateral: No edema present in extremities.  Normal movement of all extremities.  Neurological:  Was alert and oriented.   Gait And Stance: Gait and stance were normal.  Psychiatric:   Attitude: The attitude was cooperative.   Mood: Mood pleasant.  Skin:  The complexion was normal.  The skin moisture was normal and the skin temperature was normal.    Assessment:   1 month s/p LSG/HHR    ICD-10-CM ICD-9-CM   1. Status post bariatric surgery Z98.84 V45.86   2. Morbid obesity due to excess calories E66.01 278.01   3. Pure hypercholesterolemia E78.00 272.0   4. Microcytic anemia D50.9 280.9   5. Thrombocytosis D47.3 238.71         Plan:   Full bariatric panel ordered. Continue vitamins faithfully - will adjust pending lab results.   Orders Placed This Encounter   Procedures   • CBC (No Diff)   • Comprehensive Metabolic Panel   • Ferritin   • Folate   • Iron   • Magnesium   • Methylmalonic Acid, Serum   • Phosphorus   • Prealbumin   • PTH, Intact   • Vitamin A   • Vitamin B1, Whole Blood   • Vitamin D 25 Hydroxy   • Vitamin E   • " Zinc      Requested Prescriptions     Signed Prescriptions Disp Refills   • omeprazole (priLOSEC) 40 MG capsule 30 capsule 3     Sig: Take 1 capsule by mouth Daily for 90 days.     Discussed importance of increasing PO intake as tolerated w/ efforts to avoid dehydration. Discussed gastric sleeve risk associated w/ poor protein intake. Needs to work diligently to increase PO protein to >100g/day.  · Advised to take Miralax prn constipation.  • Advance diet per the manual. No ASA, NSAIDs, or steroids for 6 weeks.    • Diet history reviewed and discussed with the patient. Weight loss to date discussed with the patient.   • Recommended patient be sure to get at least 70 grams of protein per day by eating small, frequent meals all with high lean protein choices. Be sure to limit/cut back on daily carbohydrate intake. Discussed with the patient the recommended amount of water per day to intake- half of body weight in ounces. Reviewed vitamin requirements. Be sure to do routine exercise, 150 minutes per week minimum, including both cardio and strength training. Behavior modifications recommended and instructed to call the office for concerns, questions, or problems.   • The patient was instructed to follow up in 2 months  note: approx 15 of the 25 minute visit was spent counseling on dietary/lifestyle modifications    Angelina Platt, TYLER  2/24/2017

## 2017-03-01 LAB
25(OH)D3+25(OH)D2 SERPL-MCNC: 50.5 NG/ML (ref 30–100)
A-TOCOPHEROL VIT E SERPL-MCNC: 12.4 MG/L (ref 5.3–16.8)
ALBUMIN SERPL-MCNC: 4 G/DL (ref 3.5–5.5)
ALBUMIN/GLOB SERPL: 1.4 {RATIO} (ref 1.1–2.5)
ALP SERPL-CCNC: 52 IU/L (ref 39–117)
ALT SERPL-CCNC: 8 IU/L (ref 0–32)
AST SERPL-CCNC: 9 IU/L (ref 0–40)
BILIRUB SERPL-MCNC: <0.2 MG/DL (ref 0–1.2)
BUN SERPL-MCNC: 12 MG/DL (ref 6–24)
BUN/CREAT SERPL: 19 (ref 9–23)
CALCIUM SERPL-MCNC: 9.4 MG/DL (ref 8.7–10.2)
CHLORIDE SERPL-SCNC: 102 MMOL/L (ref 96–106)
CO2 SERPL-SCNC: 23 MMOL/L (ref 18–29)
CREAT SERPL-MCNC: 0.64 MG/DL (ref 0.57–1)
ERYTHROCYTE [DISTWIDTH] IN BLOOD BY AUTOMATED COUNT: 19.1 % (ref 12.3–15.4)
FERRITIN SERPL-MCNC: 65 NG/ML (ref 15–150)
FOLATE SERPL-MCNC: 8.4 NG/ML
GLOBULIN SER CALC-MCNC: 2.8 G/DL (ref 1.5–4.5)
GLUCOSE SERPL-MCNC: 100 MG/DL (ref 65–99)
HCT VFR BLD AUTO: 36 % (ref 34–46.6)
HGB BLD-MCNC: 11.3 G/DL (ref 11.1–15.9)
IRON SERPL-MCNC: 22 UG/DL (ref 27–159)
MAGNESIUM SERPL-MCNC: 1.7 MG/DL (ref 1.6–2.3)
MCH RBC QN AUTO: 24.6 PG (ref 26.6–33)
MCHC RBC AUTO-ENTMCNC: 31.4 G/DL (ref 31.5–35.7)
MCV RBC AUTO: 78 FL (ref 79–97)
METHYLMALONATE SERPL-SCNC: 58 NMOL/L (ref 0–378)
PHOSPHATE SERPL-MCNC: 3.7 MG/DL (ref 2.5–4.5)
PLATELET # BLD AUTO: 404 X10E3/UL (ref 150–379)
POTASSIUM SERPL-SCNC: 4.6 MMOL/L (ref 3.5–5.2)
PREALB SERPL-MCNC: 14 MG/DL (ref 12–34)
PROT SERPL-MCNC: 6.8 G/DL (ref 6–8.5)
PTH-INTACT SERPL-MCNC: 13 PG/ML (ref 15–65)
RBC # BLD AUTO: 4.59 X10E6/UL (ref 3.77–5.28)
SODIUM SERPL-SCNC: 141 MMOL/L (ref 134–144)
VIT A SERPL-MCNC: 25 UG/DL (ref 20–65)
VIT B1 BLD-SCNC: 107.6 NMOL/L (ref 66.5–200)
WBC # BLD AUTO: 6.7 X10E3/UL (ref 3.4–10.8)
ZINC SERPL-MCNC: 102 UG/DL (ref 56–134)

## 2017-04-21 ENCOUNTER — OFFICE VISIT (OUTPATIENT)
Dept: BARIATRICS/WEIGHT MGMT | Facility: CLINIC | Age: 47
End: 2017-04-21

## 2017-04-21 VITALS
WEIGHT: 215 LBS | DIASTOLIC BLOOD PRESSURE: 68 MMHG | TEMPERATURE: 99 F | SYSTOLIC BLOOD PRESSURE: 110 MMHG | OXYGEN SATURATION: 99 % | HEIGHT: 64 IN | HEART RATE: 78 BPM | BODY MASS INDEX: 36.7 KG/M2 | RESPIRATION RATE: 18 BRPM

## 2017-04-21 DIAGNOSIS — Z98.84 STATUS POST BARIATRIC SURGERY: Primary | ICD-10-CM

## 2017-04-21 DIAGNOSIS — D50.9 IRON DEFICIENCY ANEMIA, UNSPECIFIED IRON DEFICIENCY ANEMIA TYPE: ICD-10-CM

## 2017-04-21 DIAGNOSIS — K21.9 GASTROESOPHAGEAL REFLUX DISEASE WITHOUT ESOPHAGITIS: ICD-10-CM

## 2017-04-21 DIAGNOSIS — R53.83 FATIGUE, UNSPECIFIED TYPE: ICD-10-CM

## 2017-04-21 DIAGNOSIS — E66.01 MORBID OBESITY DUE TO EXCESS CALORIES (HCC): ICD-10-CM

## 2017-04-21 DIAGNOSIS — E78.00 ELEVATED CHOLESTEROL: ICD-10-CM

## 2017-04-21 DIAGNOSIS — D75.839 THROMBOCYTOSIS: ICD-10-CM

## 2017-04-21 PROCEDURE — 99024 POSTOP FOLLOW-UP VISIT: CPT | Performed by: PHYSICIAN ASSISTANT

## 2017-04-21 NOTE — PROGRESS NOTES
[facility] BARIATRIC SURGERY  [address]  [facility] [address]  [phone]    Laure Valentin.  1970  REASON FOR VISIT:  3 month postop    Date of Visit: 4/21/2017    HPI: Laure Valentin is a 46 y.o. female 3 months s/p LSG/HHR performed by Dr. Dick on 1/26/17.  Overall feeling good.  she reports Dysphagia when she eats steaks, says she is able to eat beef stew.  she denies:  Abdominal pain, Nausea, Vomiting, Diarrhea, Constipation, Hair loss and Reflux.  Laure states that  she is eating 80 grams of protein per day. She reports eating 3 meals per day, 2 snacks per day, drinking 64-oz. of water per day, no carbonated beverage consumption and exercising regularly. Laure is taking Omeprazole  and Actigall .   Taking  MVI, B12, B1 and Vit D.  1 month labs revealed low iron , o/w okay. Pre-surgery weight: 256 lbs. Today's weight is 215 lb (97.5 kg) pounds, today's BMI is Body mass index is 36.9 kg/(m^2)., she has a  Loss of 20 pounds since the last visit and her weight loss since surgery is 41 pounds.      Past Medical History:   Diagnosis Date   • GERD (gastroesophageal reflux disease)    • Heart murmur    • Heart murmur    • Heartburn     tums, otc prilosec prn.  spicy foods. maybe once/wk.     • Hyperlipidemia     no meds   • Joint pain    • Microcytic anemia     10.2/32.2 on iron, up from 9.4/32.4 on intake labs   • Morbid obesity    • Nephrolithiasis     8 yrs ago, passed on its own.    • Pregnancy     x 4, MC x 2 (8wks, 5 mos).    • Thrombocytosis     481K   • Wears contact lenses    • Wears eyeglasses      Past Surgical History:   Procedure Laterality Date   • COLONOSCOPY      8 years ago   • DILATATION AND CURETTAGE  1996   • GASTRIC SLEEVE LAPAROSCOPIC N/A 1/26/2017    Procedure: GASTRIC SLEEVE LAPAROSCOPIC;  Surgeon: Eulalio Dick MD;  [facility]  Service:    • LAPAROSCOPIC TUBAL LIGATION  2002   • IA LAP,ESOPHAGOGAST FUNDOPLASTY N/A 1/26/2017     Procedure: HIATAL HERNIA REPAIR LAPAROSCOPIC;  Surgeon: Eulalio Dick MD;  Location: [facility];  Service: Bariatric   • TUBAL ABDOMINAL LIGATION  2002   • WISDOM TOOTH EXTRACTION         Current Outpatient Prescriptions:   •  azithromycin (ZITHROMAX) 250 MG tablet, Take 2 tablets the first day, then 1 tablet daily for 4 days., Disp: 6 tablet, Rfl: 0  •  Calcium-Magnesium-Vitamin D (CALCIUM 500 PO), Take 1 tablet by mouth Daily., Disp: , Rfl:   •  Cholecalciferol (VITAMIN D PO), Take 1 capsule by mouth Every Night., Disp: , Rfl:   •  ferrous sulfate 324 (65 FE) MG tablet delayed-release EC tablet, Take 324 mg by mouth Daily With Breakfast., Disp: , Rfl:   •  HYDROcodone-acetaminophen (NORCO) 7.5-325 MG per tablet, Take 1 tablet by mouth Every 6 (Six) Hours As Needed for moderate pain (4-6)., Disp: 30 tablet, Rfl: 0  •  MethylPREDNISolone (MEDROL, RUDY,) 4 MG tablet, Take as directed, Disp: 21 tablet, Rfl: 0  •  Multiple Vitamins-Minerals (MULTIVITAMIN ADULT PO), Take 1 tablet by mouth Daily., Disp: , Rfl:   •  omeprazole (priLOSEC) 40 MG capsule, Take 1 capsule by mouth Daily for 90 days., Disp: 30 capsule, Rfl: 3  •  vitamin B-12 (CYANOCOBALAMIN) 1000 MCG tablet, Take 1,000 mcg by mouth Daily., Disp: , Rfl:   •  vitamin C (ASCORBIC ACID) 500 MG tablet, Take 500 mg by mouth Daily., Disp: , Rfl:   No Known Allergies  Social History     Social History   • Marital status:      Spouse name: N/A   • Number of children: N/A   • Years of education: N/A     Occupational History   • Not on file.     Social History Main Topics   • Smoking status: Never Smoker   • Smokeless tobacco: Never Used   • Alcohol use No   • Drug use: No   • Sexual activity: Defer     Other Topics Concern   • Not on file     Social History Narrative     Review of Systems   General ROS: Positive for - fatigue  Ophthalmic ROS: Negative for - vision changes  ENT ROS: Positive for - swallowing difficulty  Gastrointestinal ROS: Negative for -  "abdominal pain, constipation, diarrhea, heartburn/reflux, nausea/vomiting  Neurological ROS: Negative for - dizziness, numbness/tingling of extremities, memory loss  Dermatological ROS: Negative for - Hair loss     Physical Exam:  /68 (BP Location: Left arm, Patient Position: Sitting, Cuff Size: Large Adult)  Pulse 78  Temp 99 °F (37.2 °C) (Temporal Artery )   Resp 18  Ht 64\" (162.6 cm)  Wt 215 lb (97.5 kg)  SpO2 99%  BMI 36.9 kg/m2     General Appearance:  Well nourished.  In no acute distress.  Patient was observed to be obese.  Oral Cavity:   Buccal Mucosa: The buccal mucosa was moist.  Lungs:  Normal breath sounds/voice sounds.  Cardiovascular:   Heart Rate And Rhythm: Heart rate and rhythm normal.   Edema: No calf tenderness.  Abdomen: incisions well healed.   Auscultation: The bowel sounds were normal.   Palpation: No mass was palpated in the abdomen, Soft, nontender, and nondistended.   Hernia: No hernia was discovered.  Musculoskeletal System:   General/bilateral: No edema present in extremities.  Normal movement of all extremities.  Neurological:  Was alert and oriented.   Gait And Stance: Gait and stance were normal.  Psychiatric:   Attitude: The attitude was cooperative.   Mood: Mood pleasant.  Skin:  The complexion was normal.  The skin moisture was normal and the skin temperature was normal.    Assessment:   3 months s/p LSG/HHR, the patient is doing well.     ICD-10-CM ICD-9-CM   1. Status post bariatric surgery Z98.84 V45.86   2. Morbid obesity due to excess calories E66.01 278.01   3. Fatigue, unspecified type R53.83 780.79   4. Elevated cholesterol E78.00 272.0   5. Gastroesophageal reflux disease without esophagitis K21.9 530.81   6. Iron deficiency anemia, unspecified iron deficiency anemia type D50.9 280.9   7. Thrombocytosis D47.3 238.71     Plan:     Orders Placed This Encounter   Procedures   • CBC (No Diff)   • Comprehensive Metabolic Panel   • Ferritin   • Folate   • Iron   • " Methylmalonic Acid, Serum   • Prealbumin   • Vitamin B1, Whole Blood   · Advised to avoid steaks for a few weeks.     Diet and Exercise: Diet history reviewed and discussed with the patient. Weight loss to date discussed with the patient. Recommended patient be sure to get at least 70 grams of protein per day by eating small, frequent meals all with high lean protein choices. Be sure to limit/cut back on daily carbohydrate intake. Discussed with the patient the recommended amount of water per day to intake- half of body weight in ounces. Reviewed vitamin requirements. Be sure to do routine exercise, 150 minutes per week minimum, including both cardio and strength training.   • Recommended and instructed to call the office for concerns, questions, or problems.   • The patient was instructed to follow up in 3 months  note: approx 15 of the 25 minute visit was spent counseling on dietary/lifestyle modifications    Angelina Platt, PAC

## 2017-04-24 ENCOUNTER — APPOINTMENT (OUTPATIENT)
Dept: LAB | Facility: HOSPITAL | Age: 47
End: 2017-04-24

## 2017-04-24 LAB
ALBUMIN SERPL-MCNC: 4 G/DL (ref 3.2–4.8)
ALBUMIN/GLOB SERPL: 1.3 G/DL (ref 1.5–2.5)
ALP SERPL-CCNC: 53 U/L (ref 25–100)
ALT SERPL W P-5'-P-CCNC: 10 U/L (ref 7–40)
ANION GAP SERPL CALCULATED.3IONS-SCNC: 8 MMOL/L (ref 3–11)
AST SERPL-CCNC: 16 U/L (ref 0–33)
BILIRUB SERPL-MCNC: 0.4 MG/DL (ref 0.3–1.2)
BUN BLD-MCNC: 9 MG/DL (ref 9–23)
BUN/CREAT SERPL: 18 (ref 7–25)
CALCIUM SPEC-SCNC: 9.8 MG/DL (ref 8.7–10.4)
CHLORIDE SERPL-SCNC: 105 MMOL/L (ref 99–109)
CO2 SERPL-SCNC: 30 MMOL/L (ref 20–31)
CREAT BLD-MCNC: 0.5 MG/DL (ref 0.6–1.3)
DEPRECATED RDW RBC AUTO: 49.9 FL (ref 37–54)
ERYTHROCYTE [DISTWIDTH] IN BLOOD BY AUTOMATED COUNT: 17 % (ref 11.3–14.5)
FERRITIN SERPL-MCNC: 12 NG/ML (ref 10–291)
FOLATE SERPL-MCNC: 5.69 NG/ML (ref 3.2–20)
GFR SERPL CREATININE-BSD FRML MDRD: >150 ML/MIN/1.73
GLOBULIN UR ELPH-MCNC: 3.1 GM/DL
GLUCOSE BLD-MCNC: 83 MG/DL (ref 70–100)
HCT VFR BLD AUTO: 34.5 % (ref 34.5–44)
HGB BLD-MCNC: 10.5 G/DL (ref 11.5–15.5)
IRON 24H UR-MRATE: 29 MCG/DL (ref 50–175)
MCH RBC QN AUTO: 24.2 PG (ref 27–31)
MCHC RBC AUTO-ENTMCNC: 30.4 G/DL (ref 32–36)
MCV RBC AUTO: 79.5 FL (ref 80–99)
PLATELET # BLD AUTO: 484 10*3/MM3 (ref 150–450)
PMV BLD AUTO: 11.6 FL (ref 6–12)
POTASSIUM BLD-SCNC: 5.3 MMOL/L (ref 3.5–5.5)
PREALB SERPL-MCNC: 9.7 MG/DL (ref 10–40)
PROT SERPL-MCNC: 7.1 G/DL (ref 5.7–8.2)
RBC # BLD AUTO: 4.34 10*6/MM3 (ref 3.89–5.14)
SODIUM BLD-SCNC: 143 MMOL/L (ref 132–146)
WBC NRBC COR # BLD: 5.36 10*3/MM3 (ref 3.5–10.8)

## 2017-04-24 PROCEDURE — 83921 ORGANIC ACID SINGLE QUANT: CPT | Performed by: PHYSICIAN ASSISTANT

## 2017-04-24 PROCEDURE — 36415 COLL VENOUS BLD VENIPUNCTURE: CPT | Performed by: PHYSICIAN ASSISTANT

## 2017-04-24 PROCEDURE — 84134 ASSAY OF PREALBUMIN: CPT | Performed by: PHYSICIAN ASSISTANT

## 2017-04-24 PROCEDURE — 84425 ASSAY OF VITAMIN B-1: CPT | Performed by: PHYSICIAN ASSISTANT

## 2017-04-24 PROCEDURE — 83540 ASSAY OF IRON: CPT | Performed by: PHYSICIAN ASSISTANT

## 2017-04-24 PROCEDURE — 80053 COMPREHEN METABOLIC PANEL: CPT | Performed by: PHYSICIAN ASSISTANT

## 2017-04-24 PROCEDURE — 82746 ASSAY OF FOLIC ACID SERUM: CPT | Performed by: PHYSICIAN ASSISTANT

## 2017-04-24 PROCEDURE — 85027 COMPLETE CBC AUTOMATED: CPT | Performed by: PHYSICIAN ASSISTANT

## 2017-04-24 PROCEDURE — 82728 ASSAY OF FERRITIN: CPT | Performed by: PHYSICIAN ASSISTANT

## 2017-04-26 LAB — METHYLMALONATE SERPL-SCNC: 51 NMOL/L (ref 0–378)

## 2017-04-27 LAB — VIT B1 BLD-SCNC: 66.9 NMOL/L (ref 66.5–200)

## 2017-07-21 ENCOUNTER — OFFICE VISIT (OUTPATIENT)
Dept: BARIATRICS/WEIGHT MGMT | Facility: CLINIC | Age: 47
End: 2017-07-21

## 2017-07-21 VITALS
RESPIRATION RATE: 20 BRPM | TEMPERATURE: 98 F | DIASTOLIC BLOOD PRESSURE: 77 MMHG | HEART RATE: 74 BPM | HEIGHT: 64 IN | SYSTOLIC BLOOD PRESSURE: 106 MMHG | WEIGHT: 191.5 LBS | BODY MASS INDEX: 32.69 KG/M2 | OXYGEN SATURATION: 99 %

## 2017-07-21 DIAGNOSIS — E66.9 OBESITY, CLASS I, BMI 30-34.9: ICD-10-CM

## 2017-07-21 DIAGNOSIS — R53.83 FATIGUE, UNSPECIFIED TYPE: Primary | ICD-10-CM

## 2017-07-21 DIAGNOSIS — Z98.84 S/P BARIATRIC SURGERY: ICD-10-CM

## 2017-07-21 DIAGNOSIS — R10.13 DYSPEPSIA: ICD-10-CM

## 2017-07-21 DIAGNOSIS — D50.9 MICROCYTIC ANEMIA: ICD-10-CM

## 2017-07-21 DIAGNOSIS — E55.9 VITAMIN D DEFICIENCY: ICD-10-CM

## 2017-07-21 PROBLEM — E66.813 OBESITY, CLASS III, BMI 40-49.9 (MORBID OBESITY): Status: RESOLVED | Noted: 2017-01-26 | Resolved: 2017-07-21

## 2017-07-21 PROBLEM — E66.01 OBESITY, CLASS III, BMI 40-49.9 (MORBID OBESITY): Status: RESOLVED | Noted: 2017-01-26 | Resolved: 2017-07-21

## 2017-07-21 PROCEDURE — 99214 OFFICE O/P EST MOD 30 MIN: CPT | Performed by: PHYSICIAN ASSISTANT

## 2017-07-21 RX ORDER — URSODIOL 300 MG/1
300 CAPSULE ORAL 2 TIMES DAILY
COMMUNITY
End: 2017-07-21

## 2017-07-21 RX ORDER — OMEPRAZOLE 40 MG/1
40 CAPSULE, DELAYED RELEASE ORAL DAILY
COMMUNITY
End: 2018-01-19

## 2017-07-21 NOTE — PROGRESS NOTES
[facility] Bariatric Surgery  [address]  [facility] [address]  [phone]        Patient Name:  Laure Valentin.  :  1970      Date of Visit: 2017      Reason for Visit:   6 months postop      HPI: Laure Valentin is a 46 y.o. female s/p LSG/HHR by GDW on 17    Doing well.  No issues/concerns.  Still working to recognize her fullness.  Denies dysphagia, reflux, nausea, vomiting and abdominal pain.  Getting 80-100g prot/day.  Drinking 64 fluid oz/day.  3 month labs revealed low prealbumin (9.7), low iron, Hgb 10.5.  Taking iron daily + MVI patch.  On Omeprazole but feels she can probably go without.  Finishing [employer].  Exercising 3 days/week.     Presurgery weight: 256 pounds.  Today's weight is 191 lb 8 oz (86.9 kg) pounds, today's  Body mass index is 32.87 kg/(m^2)., and her weight loss since surgery is 65 pounds.      Past Medical History:   Diagnosis Date   • Fatigue    • GERD (gastroesophageal reflux disease)    • Heart murmur    • Heartburn     tums, otc prilosec prn.  spicy foods. maybe once/wk.     • Hyperlipidemia     no meds   • Joint pain    • Microcytic anemia     10.2/32.2 on iron, up from 9.4/32.4 on intake labs   • Morbid obesity    • Nephrolithiasis     8 yrs ago, passed on its own.    • Pregnancy     x 4, MC x 2 (8wks, 5 mos).    • Thrombocytosis     481K   • Wears contact lenses    • Wears eyeglasses      Past Surgical History:   Procedure Laterality Date   • COLONOSCOPY      8 years ago   • DILATATION AND CURETTAGE     • GASTRIC SLEEVE LAPAROSCOPIC N/A 2017    Procedure: GASTRIC SLEEVE LAPAROSCOPIC;  Surgeon: Eulalio Dick MD;  Location:  [facility];  Service:    • LAPAROSCOPIC TUBAL LIGATION     • IA LAP,ESOPHAGOGAST FUNDOPLASTY N/A 2017    Procedure: HIATAL HERNIA REPAIR LAPAROSCOPIC;  Surgeon: Eulalio Dick MD;  Location:  [facility];  Service: Bariatric   • TUBAL ABDOMINAL LIGATION     • WISDOM TOOTH  "EXTRACTION       Outpatient Prescriptions Marked as Taking for the 7/21/17 encounter (Office Visit) with JAMES Yoder   Medication Sig Dispense Refill   • Calcium-Magnesium-Vitamin D (CALCIUM 500 PO) Take 1 tablet by mouth Daily.     • Cholecalciferol (VITAMIN D PO) Take 1 capsule by mouth Every Night.     • ferrous sulfate 324 (65 FE) MG tablet delayed-release EC tablet Take 324 mg by mouth Daily With Breakfast.     • Multiple Vitamins-Minerals (MULTIVITAMIN ADULT PO) Take 1 tablet by mouth Daily.     • omeprazole (priLOSEC) 40 MG capsule Take 40 mg by mouth Daily.     • vitamin B-12 (CYANOCOBALAMIN) 1000 MCG tablet Take 1,000 mcg by mouth Daily.     • vitamin C (ASCORBIC ACID) 500 MG tablet Take 500 mg by mouth Daily.     • [DISCONTINUED] ursodiol (ACTIGALL) 300 MG capsule Take 300 mg by mouth 2 (Two) Times a Day.         No Known Allergies    Social History     Social History   • Marital status:      Spouse name: N/A   • Number of children: N/A   • Years of education: N/A     Occupational History   • Not on file.     Social History Main Topics   • Smoking status: Never Smoker   • Smokeless tobacco: Never Used   • Alcohol use No   • Drug use: No   • Sexual activity: Defer     Other Topics Concern   • Not on file     Social History Narrative       /77 (BP Location: Right arm, Patient Position: Sitting, Cuff Size: Large Adult)  Pulse 74  Temp 98 °F (36.7 °C) (Temporal Artery )   Resp 20  Ht 64\" (162.6 cm)  Wt 191 lb 8 oz (86.9 kg)  SpO2 99%  BMI 32.87 kg/m2    Physical Exam   Constitutional: She appears well-developed and well-nourished. She is cooperative.   HENT:   Mouth/Throat: Oropharynx is clear and moist and mucous membranes are normal.   Eyes: Conjunctivae are normal. No scleral icterus.   Cardiovascular: Normal rate.    Pulmonary/Chest: Effort normal.   Abdominal: Soft. There is no tenderness.   Musculoskeletal: Normal range of motion. She exhibits no edema.   Neurological: She " is alert.   Skin: Skin is warm and dry. No rash noted.   Psychiatric: She has a normal mood and affect. Judgment normal.         Assessment:  6 months s/p LSG/HHR by SHAWNEE on 1/26/17      ICD-10-CM ICD-9-CM   1. Fatigue, unspecified type R53.83 780.79   2. Microcytic anemia D50.9 280.9   3. Dyspepsia R10.13 536.8   4. Vitamin D deficiency E55.9 268.9   5. Obesity, Class I, BMI 30-34.9 E66.9 278.00   6. S/P bariatric surgery Z98.84 V45.86     Plan:  Continue w/ good food choices and healthy habits.  Continue to focus on high protein, low carb.  Keep tracking intake.  Continue routine exercise.  Routine bariatric labs ordered.  Continue vitamins w/ adjustments pending lab results.  Call w/ problems/concerns.     The patient was instructed to follow up in 6 months, sooner if needed.    note: approx 15 of the 25 minute visit was spent counseling on nutrition and necessary dietary/lifestyle modifications.

## 2017-07-27 LAB
25(OH)D3+25(OH)D2 SERPL-MCNC: 40.5 NG/ML (ref 30–100)
ALBUMIN SERPL-MCNC: 4 G/DL (ref 3.5–5.5)
ALBUMIN/GLOB SERPL: 1.4 {RATIO} (ref 1.2–2.2)
ALP SERPL-CCNC: 49 IU/L (ref 39–117)
ALT SERPL-CCNC: 7 IU/L (ref 0–32)
AST SERPL-CCNC: 12 IU/L (ref 0–40)
BASOPHILS # BLD AUTO: 0 X10E3/UL (ref 0–0.2)
BASOPHILS NFR BLD AUTO: 1 %
BILIRUB SERPL-MCNC: <0.2 MG/DL (ref 0–1.2)
BUN SERPL-MCNC: 19 MG/DL (ref 6–24)
BUN/CREAT SERPL: 33 (ref 9–23)
CALCIUM SERPL-MCNC: 9 MG/DL (ref 8.7–10.2)
CHLORIDE SERPL-SCNC: 103 MMOL/L (ref 96–106)
CO2 SERPL-SCNC: 21 MMOL/L (ref 18–29)
CREAT SERPL-MCNC: 0.58 MG/DL (ref 0.57–1)
EOSINOPHIL # BLD AUTO: 0 X10E3/UL (ref 0–0.4)
EOSINOPHIL NFR BLD AUTO: 1 %
ERYTHROCYTE [DISTWIDTH] IN BLOOD BY AUTOMATED COUNT: 17.3 % (ref 12.3–15.4)
FERRITIN SERPL-MCNC: 11 NG/ML (ref 15–150)
FOLATE SERPL-MCNC: 5.4 NG/ML
GLOBULIN SER CALC-MCNC: 2.9 G/DL (ref 1.5–4.5)
GLUCOSE SERPL-MCNC: 102 MG/DL (ref 65–99)
HCT VFR BLD AUTO: 31.7 % (ref 34–46.6)
HGB BLD-MCNC: 9.3 G/DL (ref 11.1–15.9)
IMM GRANULOCYTES # BLD: 0 X10E3/UL (ref 0–0.1)
IMM GRANULOCYTES NFR BLD: 0 %
IRON SERPL-MCNC: 175 UG/DL (ref 27–159)
LYMPHOCYTES # BLD AUTO: 1.9 X10E3/UL (ref 0.7–3.1)
LYMPHOCYTES NFR BLD AUTO: 31 %
MCH RBC QN AUTO: 22.7 PG (ref 26.6–33)
MCHC RBC AUTO-ENTMCNC: 29.3 G/DL (ref 31.5–35.7)
MCV RBC AUTO: 78 FL (ref 79–97)
METHYLMALONATE SERPL-SCNC: 113 NMOL/L (ref 0–378)
MONOCYTES # BLD AUTO: 0.3 X10E3/UL (ref 0.1–0.9)
MONOCYTES NFR BLD AUTO: 6 %
NEUTROPHILS # BLD AUTO: 3.8 X10E3/UL (ref 1.4–7)
NEUTROPHILS NFR BLD AUTO: 61 %
PLATELET # BLD AUTO: 511 X10E3/UL (ref 150–379)
POTASSIUM SERPL-SCNC: 4.9 MMOL/L (ref 3.5–5.2)
PREALB SERPL-MCNC: 12 MG/DL (ref 12–34)
PROT SERPL-MCNC: 6.9 G/DL (ref 6–8.5)
RBC # BLD AUTO: 4.09 X10E6/UL (ref 3.77–5.28)
SODIUM SERPL-SCNC: 142 MMOL/L (ref 134–144)
VIT B1 BLD-SCNC: 60.9 NMOL/L (ref 66.5–200)
WBC # BLD AUTO: 6.1 X10E3/UL (ref 3.4–10.8)

## 2017-09-14 ENCOUNTER — TRANSCRIBE ORDERS (OUTPATIENT)
Dept: ADMINISTRATIVE | Facility: HOSPITAL | Age: 47
End: 2017-09-14

## 2017-09-14 DIAGNOSIS — Z12.31 VISIT FOR SCREENING MAMMOGRAM: Primary | ICD-10-CM

## 2017-10-04 ENCOUNTER — INFUSION (OUTPATIENT)
Dept: ONCOLOGY | Facility: HOSPITAL | Age: 47
End: 2017-10-04

## 2017-10-04 VITALS
DIASTOLIC BLOOD PRESSURE: 52 MMHG | WEIGHT: 177 LBS | HEART RATE: 74 BPM | BODY MASS INDEX: 30.22 KG/M2 | SYSTOLIC BLOOD PRESSURE: 98 MMHG | RESPIRATION RATE: 20 BRPM | TEMPERATURE: 98.5 F | HEIGHT: 64 IN

## 2017-10-04 DIAGNOSIS — D50.9 IRON DEFICIENCY ANEMIA, UNSPECIFIED IRON DEFICIENCY ANEMIA TYPE: Primary | ICD-10-CM

## 2017-10-04 PROCEDURE — 96374 THER/PROPH/DIAG INJ IV PUSH: CPT

## 2017-10-04 PROCEDURE — 25010000002 FERUMOXYTOL 510 MG/17ML SOLUTION 510 MG VIAL: Performed by: INTERNAL MEDICINE

## 2017-10-04 RX ORDER — SODIUM CHLORIDE 9 MG/ML
250 INJECTION, SOLUTION INTRAVENOUS ONCE
Status: COMPLETED | OUTPATIENT
Start: 2017-10-04 | End: 2017-10-04

## 2017-10-04 RX ORDER — SODIUM CHLORIDE 9 MG/ML
250 INJECTION, SOLUTION INTRAVENOUS ONCE
Status: CANCELLED | OUTPATIENT
Start: 2017-10-04

## 2017-10-04 RX ADMIN — FERUMOXYTOL 510 MG: 510 INJECTION INTRAVENOUS at 14:28

## 2017-10-04 RX ADMIN — SODIUM CHLORIDE 250 ML: 9 INJECTION, SOLUTION INTRAVENOUS at 14:28

## 2017-10-11 ENCOUNTER — INFUSION (OUTPATIENT)
Dept: ONCOLOGY | Facility: HOSPITAL | Age: 47
End: 2017-10-11

## 2017-10-11 ENCOUNTER — HOSPITAL ENCOUNTER (OUTPATIENT)
Dept: MAMMOGRAPHY | Facility: HOSPITAL | Age: 47
Discharge: HOME | End: 2017-10-11
Attending: INTERNAL MEDICINE | Admitting: INTERNAL MEDICINE

## 2017-10-11 VITALS
HEIGHT: 64 IN | DIASTOLIC BLOOD PRESSURE: 58 MMHG | WEIGHT: 175 LBS | HEART RATE: 76 BPM | BODY MASS INDEX: 29.88 KG/M2 | TEMPERATURE: 98.6 F | RESPIRATION RATE: 18 BRPM | SYSTOLIC BLOOD PRESSURE: 110 MMHG

## 2017-10-11 DIAGNOSIS — D50.9 IRON DEFICIENCY ANEMIA, UNSPECIFIED IRON DEFICIENCY ANEMIA TYPE: Primary | ICD-10-CM

## 2017-10-11 DIAGNOSIS — Z12.31 VISIT FOR SCREENING MAMMOGRAM: ICD-10-CM

## 2017-10-11 PROCEDURE — G0202 SCR MAMMO BI INCL CAD: HCPCS

## 2017-10-11 PROCEDURE — 96374 THER/PROPH/DIAG INJ IV PUSH: CPT

## 2017-10-11 PROCEDURE — 25010000002 FERUMOXYTOL 510 MG/17ML SOLUTION 510 MG VIAL: Performed by: INTERNAL MEDICINE

## 2017-10-11 PROCEDURE — 77063 BREAST TOMOSYNTHESIS BI: CPT

## 2017-10-11 RX ORDER — SODIUM CHLORIDE 9 MG/ML
250 INJECTION, SOLUTION INTRAVENOUS ONCE
Status: COMPLETED | OUTPATIENT
Start: 2017-10-11 | End: 2017-10-11

## 2017-10-11 RX ORDER — SODIUM CHLORIDE 9 MG/ML
250 INJECTION, SOLUTION INTRAVENOUS ONCE
Status: CANCELLED | OUTPATIENT
Start: 2017-10-11

## 2017-10-11 RX ADMIN — FERUMOXYTOL 510 MG: 510 INJECTION INTRAVENOUS at 11:19

## 2017-10-11 RX ADMIN — SODIUM CHLORIDE 250 ML: 9 INJECTION, SOLUTION INTRAVENOUS at 11:19

## 2017-10-12 PROCEDURE — 77067 SCR MAMMO BI INCL CAD: CPT | Performed by: RADIOLOGY

## 2017-10-12 PROCEDURE — 77063 BREAST TOMOSYNTHESIS BI: CPT | Performed by: RADIOLOGY

## 2017-10-18 ENCOUNTER — INFUSION (OUTPATIENT)
Dept: ONCOLOGY | Facility: HOSPITAL | Age: 47
End: 2017-10-18

## 2017-10-18 VITALS
SYSTOLIC BLOOD PRESSURE: 91 MMHG | WEIGHT: 171 LBS | TEMPERATURE: 97.7 F | DIASTOLIC BLOOD PRESSURE: 51 MMHG | HEIGHT: 64 IN | HEART RATE: 61 BPM | RESPIRATION RATE: 18 BRPM | BODY MASS INDEX: 29.19 KG/M2

## 2017-10-18 DIAGNOSIS — D50.9 IRON DEFICIENCY ANEMIA, UNSPECIFIED IRON DEFICIENCY ANEMIA TYPE: Primary | ICD-10-CM

## 2017-10-18 PROCEDURE — 96374 THER/PROPH/DIAG INJ IV PUSH: CPT

## 2017-10-18 PROCEDURE — 96360 HYDRATION IV INFUSION INIT: CPT

## 2017-10-18 PROCEDURE — 25010000002 FERUMOXYTOL 510 MG/17ML SOLUTION 510 MG VIAL: Performed by: INTERNAL MEDICINE

## 2017-10-18 PROCEDURE — 36415 COLL VENOUS BLD VENIPUNCTURE: CPT

## 2017-10-18 RX ORDER — SODIUM CHLORIDE 9 MG/ML
250 INJECTION, SOLUTION INTRAVENOUS ONCE
Status: COMPLETED | OUTPATIENT
Start: 2017-10-18 | End: 2017-10-18

## 2017-10-18 RX ORDER — SODIUM CHLORIDE 9 MG/ML
250 INJECTION, SOLUTION INTRAVENOUS ONCE
Status: CANCELLED | OUTPATIENT
Start: 2017-10-18

## 2017-10-18 RX ADMIN — FERUMOXYTOL 510 MG: 510 INJECTION INTRAVENOUS at 14:19

## 2017-10-18 RX ADMIN — SODIUM CHLORIDE: 9 INJECTION, SOLUTION INTRAVENOUS at 14:18

## 2017-10-24 ENCOUNTER — TRANSCRIBE ORDERS (OUTPATIENT)
Dept: MAMMOGRAPHY | Facility: HOSPITAL | Age: 47
End: 2017-10-24

## 2017-10-24 ENCOUNTER — HOSPITAL ENCOUNTER (OUTPATIENT)
Dept: MAMMOGRAPHY | Facility: HOSPITAL | Age: 47
Discharge: HOME | End: 2017-10-24
Admitting: INTERNAL MEDICINE

## 2017-10-24 DIAGNOSIS — R92.8 ABNORMAL MAMMOGRAM: ICD-10-CM

## 2017-10-24 DIAGNOSIS — R92.8 ABNORMAL MAMMOGRAM: Primary | ICD-10-CM

## 2017-10-24 PROCEDURE — 77062 BREAST TOMOSYNTHESIS BI: CPT | Performed by: RADIOLOGY

## 2017-10-24 PROCEDURE — G0279 TOMOSYNTHESIS, MAMMO: HCPCS

## 2017-10-24 PROCEDURE — 77066 DX MAMMO INCL CAD BI: CPT | Performed by: RADIOLOGY

## 2017-10-24 PROCEDURE — G0204 DX MAMMO INCL CAD BI: HCPCS

## 2017-10-25 ENCOUNTER — INFUSION (OUTPATIENT)
Dept: ONCOLOGY | Facility: HOSPITAL | Age: 47
End: 2017-10-25

## 2017-10-25 VITALS
RESPIRATION RATE: 16 BRPM | HEIGHT: 64 IN | TEMPERATURE: 98.1 F | DIASTOLIC BLOOD PRESSURE: 52 MMHG | SYSTOLIC BLOOD PRESSURE: 95 MMHG | HEART RATE: 57 BPM | WEIGHT: 174 LBS | BODY MASS INDEX: 29.71 KG/M2

## 2017-10-25 DIAGNOSIS — D50.9 IRON DEFICIENCY ANEMIA, UNSPECIFIED IRON DEFICIENCY ANEMIA TYPE: Primary | ICD-10-CM

## 2017-10-25 PROCEDURE — 96374 THER/PROPH/DIAG INJ IV PUSH: CPT

## 2017-10-25 PROCEDURE — 25010000002 FERUMOXYTOL 510 MG/17ML SOLUTION 510 MG VIAL: Performed by: INTERNAL MEDICINE

## 2017-10-25 RX ORDER — SODIUM CHLORIDE 9 MG/ML
250 INJECTION, SOLUTION INTRAVENOUS ONCE
Status: CANCELLED | OUTPATIENT
Start: 2017-10-25

## 2017-10-25 RX ORDER — SODIUM CHLORIDE 9 MG/ML
250 INJECTION, SOLUTION INTRAVENOUS ONCE
Status: DISCONTINUED | OUTPATIENT
Start: 2017-10-25 | End: 2017-10-25 | Stop reason: HOSPADM

## 2017-10-25 RX ADMIN — FERUMOXYTOL 510 MG: 510 INJECTION INTRAVENOUS at 14:28

## 2017-11-08 ENCOUNTER — TRANSCRIBE ORDERS (OUTPATIENT)
Dept: LAB | Facility: HOSPITAL | Age: 47
End: 2017-11-08

## 2017-11-08 ENCOUNTER — LAB (OUTPATIENT)
Dept: LAB | Facility: HOSPITAL | Age: 47
End: 2017-11-08

## 2017-11-08 DIAGNOSIS — D50.9 NORMOCYTIC HYPOCHROMIC ANEMIA: ICD-10-CM

## 2017-11-08 DIAGNOSIS — D50.9 NORMOCYTIC HYPOCHROMIC ANEMIA: Primary | ICD-10-CM

## 2017-11-08 LAB
BASOPHILS # BLD AUTO: 0.04 10*3/MM3 (ref 0–0.2)
BASOPHILS NFR BLD AUTO: 0.8 % (ref 0–1)
DEPRECATED RDW RBC AUTO: ABNORMAL FL (ref 37–54)
EOSINOPHIL # BLD AUTO: 0.05 10*3/MM3 (ref 0–0.3)
EOSINOPHIL NFR BLD AUTO: 1 % (ref 0–3)
ERYTHROCYTE [DISTWIDTH] IN BLOOD BY AUTOMATED COUNT: ABNORMAL % (ref 11.3–14.5)
HCT VFR BLD AUTO: 37.5 % (ref 34.5–44)
HGB BLD-MCNC: 11.7 G/DL (ref 11.5–15.5)
IMM GRANULOCYTES # BLD: 0.01 10*3/MM3 (ref 0–0.03)
IMM GRANULOCYTES NFR BLD: 0.2 % (ref 0–0.6)
LYMPHOCYTES # BLD AUTO: 1.63 10*3/MM3 (ref 0.6–4.8)
LYMPHOCYTES NFR BLD AUTO: 32.8 % (ref 24–44)
MCH RBC QN AUTO: 25.3 PG (ref 27–31)
MCHC RBC AUTO-ENTMCNC: 31.2 G/DL (ref 32–36)
MCV RBC AUTO: 81.2 FL (ref 80–99)
MICROCYTES BLD QL: NORMAL
MONOCYTES # BLD AUTO: 0.35 10*3/MM3 (ref 0–1)
MONOCYTES NFR BLD AUTO: 7 % (ref 0–12)
NEUTROPHILS # BLD AUTO: 2.89 10*3/MM3 (ref 1.5–8.3)
NEUTROPHILS NFR BLD AUTO: 58.2 % (ref 41–71)
OVALOCYTES BLD QL SMEAR: NORMAL
PLAT MORPH BLD: NORMAL
PLATELET # BLD AUTO: 293 10*3/MM3 (ref 150–450)
PMV BLD AUTO: 11.4 FL (ref 6–12)
RBC # BLD AUTO: 4.62 10*6/MM3 (ref 3.89–5.14)
TARGETS BLD QL SMEAR: NORMAL
WBC MORPH BLD: NORMAL
WBC NRBC COR # BLD: 4.97 10*3/MM3 (ref 3.5–10.8)

## 2017-11-08 PROCEDURE — 85025 COMPLETE CBC W/AUTO DIFF WBC: CPT | Performed by: INTERNAL MEDICINE

## 2017-11-08 PROCEDURE — 85007 BL SMEAR W/DIFF WBC COUNT: CPT | Performed by: INTERNAL MEDICINE

## 2017-11-08 PROCEDURE — 36415 COLL VENOUS BLD VENIPUNCTURE: CPT

## 2017-12-23 PROCEDURE — 99283 EMERGENCY DEPT VISIT LOW MDM: CPT

## 2017-12-24 ENCOUNTER — HOSPITAL ENCOUNTER (EMERGENCY)
Facility: HOSPITAL | Age: 47
Discharge: HOME | End: 2017-12-24
Attending: EMERGENCY MEDICINE | Admitting: EMERGENCY MEDICINE

## 2017-12-24 ENCOUNTER — APPOINTMENT (OUTPATIENT)
Dept: CT IMAGING | Facility: HOSPITAL | Age: 47
End: 2017-12-24

## 2017-12-24 VITALS
OXYGEN SATURATION: 100 % | SYSTOLIC BLOOD PRESSURE: 99 MMHG | TEMPERATURE: 98.3 F | HEIGHT: 63 IN | BODY MASS INDEX: 28.88 KG/M2 | RESPIRATION RATE: 20 BRPM | WEIGHT: 163 LBS | DIASTOLIC BLOOD PRESSURE: 61 MMHG | HEART RATE: 68 BPM

## 2017-12-24 DIAGNOSIS — N20.1 CALCULUS OF URETER: Primary | ICD-10-CM

## 2017-12-24 LAB
ALBUMIN SERPL-MCNC: 4.2 G/DL (ref 3.5–5)
ALBUMIN/GLOB SERPL: 1.5 G/DL (ref 1–2)
ALP SERPL-CCNC: 50 U/L (ref 38–126)
ALT SERPL W P-5'-P-CCNC: 34 U/L (ref 13–69)
ANION GAP SERPL CALCULATED.3IONS-SCNC: 15.4 MMOL/L
AST SERPL-CCNC: 21 U/L (ref 15–46)
BACTERIA UR QL AUTO: ABNORMAL /HPF
BASOPHILS # BLD AUTO: 0.07 10*3/MM3 (ref 0–0.2)
BASOPHILS NFR BLD AUTO: 1 % (ref 0–2.5)
BILIRUB SERPL-MCNC: 0.2 MG/DL (ref 0.2–1.3)
BILIRUB UR QL STRIP: NEGATIVE
BUN BLD-MCNC: 11 MG/DL (ref 7–20)
BUN/CREAT SERPL: 18.3 (ref 7.1–23.5)
CALCIUM SPEC-SCNC: 9.6 MG/DL (ref 8.4–10.2)
CHLORIDE SERPL-SCNC: 104 MMOL/L (ref 98–107)
CLARITY UR: ABNORMAL
CO2 SERPL-SCNC: 25 MMOL/L (ref 26–30)
COLOR UR: YELLOW
CREAT BLD-MCNC: 0.6 MG/DL (ref 0.6–1.3)
DEPRECATED RDW RBC AUTO: 53.8 FL (ref 37–54)
EOSINOPHIL # BLD AUTO: 0.02 10*3/MM3 (ref 0–0.7)
EOSINOPHIL NFR BLD AUTO: 0.3 % (ref 0–7)
ERYTHROCYTE [DISTWIDTH] IN BLOOD BY AUTOMATED COUNT: 16.9 % (ref 11.5–14.5)
GFR SERPL CREATININE-BSD FRML MDRD: 130 ML/MIN/1.73
GLOBULIN UR ELPH-MCNC: 2.8 GM/DL
GLUCOSE BLD-MCNC: 137 MG/DL (ref 74–98)
GLUCOSE UR STRIP-MCNC: NEGATIVE MG/DL
HCT VFR BLD AUTO: 35.9 % (ref 37–47)
HGB BLD-MCNC: 11.4 G/DL (ref 12–16)
HGB UR QL STRIP.AUTO: ABNORMAL
HOLD SPECIMEN: NORMAL
HOLD SPECIMEN: NORMAL
HYALINE CASTS UR QL AUTO: ABNORMAL /LPF
IMM GRANULOCYTES # BLD: 0.02 10*3/MM3 (ref 0–0.06)
IMM GRANULOCYTES NFR BLD: 0.3 % (ref 0–0.6)
KETONES UR QL STRIP: ABNORMAL
LEUKOCYTE ESTERASE UR QL STRIP.AUTO: NEGATIVE
LIPASE SERPL-CCNC: 78 U/L (ref 23–300)
LYMPHOCYTES # BLD AUTO: 1.73 10*3/MM3 (ref 0.6–3.4)
LYMPHOCYTES NFR BLD AUTO: 25.1 % (ref 10–50)
MCH RBC QN AUTO: 27.3 PG (ref 27–31)
MCHC RBC AUTO-ENTMCNC: 31.8 G/DL (ref 30–37)
MCV RBC AUTO: 85.9 FL (ref 81–99)
MONOCYTES # BLD AUTO: 0.38 10*3/MM3 (ref 0–0.9)
MONOCYTES NFR BLD AUTO: 5.5 % (ref 0–12)
NEUTROPHILS # BLD AUTO: 4.67 10*3/MM3 (ref 2–6.9)
NEUTROPHILS NFR BLD AUTO: 67.8 % (ref 37–80)
NITRITE UR QL STRIP: NEGATIVE
NRBC BLD MANUAL-RTO: 0 /100 WBC (ref 0–0)
PH UR STRIP.AUTO: 6 [PH] (ref 5–8)
PLATELET # BLD AUTO: 247 10*3/MM3 (ref 130–400)
PMV BLD AUTO: 11 FL (ref 6–12)
POTASSIUM BLD-SCNC: 4.4 MMOL/L (ref 3.5–5.1)
PROT SERPL-MCNC: 7 G/DL (ref 6.3–8.2)
PROT UR QL STRIP: NEGATIVE
RBC # BLD AUTO: 4.18 10*6/MM3 (ref 4.2–5.4)
RBC # UR: ABNORMAL /HPF
REF LAB TEST METHOD: ABNORMAL
SODIUM BLD-SCNC: 140 MMOL/L (ref 137–145)
SP GR UR STRIP: <=1.005 (ref 1–1.03)
SQUAMOUS #/AREA URNS HPF: ABNORMAL /HPF
UROBILINOGEN UR QL STRIP: ABNORMAL
WBC NRBC COR # BLD: 6.89 10*3/MM3 (ref 4.8–10.8)
WBC UR QL AUTO: ABNORMAL /HPF
WHOLE BLOOD HOLD SPECIMEN: NORMAL
WHOLE BLOOD HOLD SPECIMEN: NORMAL

## 2017-12-24 PROCEDURE — 0 IOPAMIDOL 61 % SOLUTION: Performed by: EMERGENCY MEDICINE

## 2017-12-24 PROCEDURE — 96374 THER/PROPH/DIAG INJ IV PUSH: CPT

## 2017-12-24 PROCEDURE — 80053 COMPREHEN METABOLIC PANEL: CPT | Performed by: EMERGENCY MEDICINE

## 2017-12-24 PROCEDURE — 25010000002 MORPHINE PER 10 MG: Performed by: EMERGENCY MEDICINE

## 2017-12-24 PROCEDURE — 96375 TX/PRO/DX INJ NEW DRUG ADDON: CPT

## 2017-12-24 PROCEDURE — 96361 HYDRATE IV INFUSION ADD-ON: CPT

## 2017-12-24 PROCEDURE — 83690 ASSAY OF LIPASE: CPT | Performed by: EMERGENCY MEDICINE

## 2017-12-24 PROCEDURE — 74177 CT ABD & PELVIS W/CONTRAST: CPT

## 2017-12-24 PROCEDURE — 85025 COMPLETE CBC W/AUTO DIFF WBC: CPT | Performed by: EMERGENCY MEDICINE

## 2017-12-24 PROCEDURE — 25010000002 ONDANSETRON PER 1 MG: Performed by: EMERGENCY MEDICINE

## 2017-12-24 PROCEDURE — 25010000002 KETOROLAC TROMETHAMINE PER 15 MG: Performed by: EMERGENCY MEDICINE

## 2017-12-24 PROCEDURE — 87086 URINE CULTURE/COLONY COUNT: CPT | Performed by: EMERGENCY MEDICINE

## 2017-12-24 PROCEDURE — 81001 URINALYSIS AUTO W/SCOPE: CPT | Performed by: EMERGENCY MEDICINE

## 2017-12-24 RX ORDER — MORPHINE SULFATE 2 MG/ML
4 INJECTION, SOLUTION INTRAMUSCULAR; INTRAVENOUS ONCE
Status: COMPLETED | OUTPATIENT
Start: 2017-12-24 | End: 2017-12-24

## 2017-12-24 RX ORDER — ONDANSETRON 4 MG/1
4 TABLET, FILM COATED ORAL EVERY 4 HOURS PRN
Qty: 12 TABLET | Refills: 0 | Status: SHIPPED | OUTPATIENT
Start: 2017-12-24 | End: 2018-01-19

## 2017-12-24 RX ORDER — NAPROXEN 250 MG/1
250 TABLET ORAL 2 TIMES DAILY PRN
Qty: 10 TABLET | Refills: 0 | Status: SHIPPED | OUTPATIENT
Start: 2017-12-24 | End: 2018-01-19

## 2017-12-24 RX ORDER — ONDANSETRON 2 MG/ML
4 INJECTION INTRAMUSCULAR; INTRAVENOUS ONCE
Status: COMPLETED | OUTPATIENT
Start: 2017-12-24 | End: 2017-12-24

## 2017-12-24 RX ORDER — KETOROLAC TROMETHAMINE 30 MG/ML
30 INJECTION, SOLUTION INTRAMUSCULAR; INTRAVENOUS ONCE
Status: COMPLETED | OUTPATIENT
Start: 2017-12-24 | End: 2017-12-24

## 2017-12-24 RX ORDER — SODIUM CHLORIDE 0.9 % (FLUSH) 0.9 %
10 SYRINGE (ML) INJECTION AS NEEDED
Status: DISCONTINUED | OUTPATIENT
Start: 2017-12-24 | End: 2017-12-24 | Stop reason: HOSPADM

## 2017-12-24 RX ORDER — OXYCODONE HYDROCHLORIDE AND ACETAMINOPHEN 5; 325 MG/1; MG/1
1 TABLET ORAL EVERY 4 HOURS PRN
Qty: 10 TABLET | Refills: 0 | Status: SHIPPED | OUTPATIENT
Start: 2017-12-24 | End: 2018-01-19

## 2017-12-24 RX ADMIN — MORPHINE SULFATE 4 MG: 2 INJECTION, SOLUTION INTRAMUSCULAR; INTRAVENOUS at 02:40

## 2017-12-24 RX ADMIN — KETOROLAC TROMETHAMINE 30 MG: 30 INJECTION, SOLUTION INTRAMUSCULAR at 04:54

## 2017-12-24 RX ADMIN — SODIUM CHLORIDE 500 ML: 9 INJECTION, SOLUTION INTRAVENOUS at 04:54

## 2017-12-24 RX ADMIN — ONDANSETRON 4 MG: 2 INJECTION INTRAMUSCULAR; INTRAVENOUS at 02:09

## 2017-12-24 RX ADMIN — IOPAMIDOL 100 ML: 612 INJECTION, SOLUTION INTRAVENOUS at 03:08

## 2017-12-27 LAB — BACTERIA SPEC AEROBE CULT: NO GROWTH

## 2018-01-19 ENCOUNTER — OFFICE VISIT (OUTPATIENT)
Dept: BARIATRICS/WEIGHT MGMT | Facility: CLINIC | Age: 48
End: 2018-01-19

## 2018-01-19 VITALS
HEIGHT: 63 IN | RESPIRATION RATE: 18 BRPM | HEART RATE: 63 BPM | OXYGEN SATURATION: 99 % | SYSTOLIC BLOOD PRESSURE: 97 MMHG | BODY MASS INDEX: 28.17 KG/M2 | WEIGHT: 159 LBS | DIASTOLIC BLOOD PRESSURE: 65 MMHG | TEMPERATURE: 98.1 F

## 2018-01-19 DIAGNOSIS — E55.9 VITAMIN D DEFICIENCY: ICD-10-CM

## 2018-01-19 DIAGNOSIS — E51.9 VITAMIN B1 DEFICIENCY: ICD-10-CM

## 2018-01-19 DIAGNOSIS — D50.9 IRON DEFICIENCY ANEMIA, UNSPECIFIED IRON DEFICIENCY ANEMIA TYPE: ICD-10-CM

## 2018-01-19 DIAGNOSIS — R53.83 FATIGUE, UNSPECIFIED TYPE: Primary | ICD-10-CM

## 2018-01-19 DIAGNOSIS — Z98.84 S/P BARIATRIC SURGERY: ICD-10-CM

## 2018-01-19 PROCEDURE — 99214 OFFICE O/P EST MOD 30 MIN: CPT | Performed by: PHYSICIAN ASSISTANT

## 2018-01-19 PROCEDURE — 94690 O2 UPTK REST INDIRECT: CPT | Performed by: PHYSICIAN ASSISTANT

## 2018-01-19 NOTE — PROGRESS NOTES
[facility] Bariatric Surgery  [address]  [facility] [address]  [phone]        Patient Name:  Laure Valentin.  :  1970      Date of Visit: 2018      Reason for Visit:   6 months postop      HPI: Laure Valentin is a 47 y.o. female s/p LSG/HHR by GDW on 17    Doing well - feeling good.  Very pleased w/ her progress.  Since LOV did have a kidney stone, but says it was not retrieved, so she has no intention of changing her diet at this time.  Continues to focus on high protein (>100g/day) and low carb (<50g/day) food choices.  Has increased her exercise to 5-6 days/week since last visit as well.  No other issues/concerns.     Last bariatric labs 2017 revealed - low Vit B1 (60.9), low ferritin (11) but high iron (175), low Hgb 9.3.  She has since had 4 IV iron infusions.  On 17 in the ER labs revealed Hgb 11.4 w/ MCV 85.9.  She continues on a daily MVI patch and takes only the occasional iron OTC supplement.  She did not supplement her Vit B1 as advised.      Presurgery weight: 256 pounds.  Today's weight is 72.1 kg (159 lb) pounds, today's  Body mass index is 28.17 kg/(m^2)., and her weight loss since surgery is 97 pounds.      Past Medical History:   Diagnosis Date   • Fatigue    • GERD (gastroesophageal reflux disease)     resolved w/ WLS   • Heart murmur    • Hyperlipidemia     no meds   • Joint pain    • Microcytic anemia    • Morbid obesity    • Nephrolithiasis     x 2, most recent 17   • Pregnancy     x 4, MC x 2 (8wks, 5 mos).    • Thrombocytosis     481K   • Wears contact lenses    • Wears eyeglasses      Past Surgical History:   Procedure Laterality Date   • COLONOSCOPY      8 years ago   • DILATATION AND CURETTAGE     • GASTRIC SLEEVE LAPAROSCOPIC N/A 2017    Procedure: GASTRIC SLEEVE LAPAROSCOPIC;  Surgeon: Eulalio Dick MD;  Location: [facility];  Service:    • LAPAROSCOPIC TUBAL LIGATION     • IA LAP,ESOPHAGOGAST  "FUNDOPLASTY N/A 1/26/2017    Procedure: HIATAL HERNIA REPAIR LAPAROSCOPIC;  Surgeon: Eulalio Dick MD;  Location: [facility];  Service: Bariatric   • TUBAL ABDOMINAL LIGATION  2002   • WISDOM TOOTH EXTRACTION       Outpatient Prescriptions Marked as Taking for the 1/19/18 encounter (Office Visit) with JAMES Yoder   Medication Sig Dispense Refill   • ferrous sulfate 324 (65 FE) MG tablet delayed-release EC tablet Take 324 mg by mouth Daily With Breakfast.     • Multiple Vitamins-Minerals (MULTIVITAMIN ADULT PO) Take 1 tablet by mouth Daily.     • vitamin B-12 (CYANOCOBALAMIN) 1000 MCG tablet Take 1,000 mcg by mouth Daily.         No Known Allergies    Social History     Social History   • Marital status:      Spouse name: N/A   • Number of children: N/A   • Years of education: N/A     Occupational History   • Not on file.     Social History Main Topics   • Smoking status: Never Smoker   • Smokeless tobacco: Never Used   • Alcohol use No   • Drug use: No   • Sexual activity: Defer     Other Topics Concern   • Not on file     Social History Narrative       BP 97/65 (BP Location: Left arm, Patient Position: Sitting, Cuff Size: Large Adult)  Pulse 63  Temp 98.1 °F (36.7 °C) (Temporal Artery )   Resp 18  Ht 160 cm (63\")  Wt 72.1 kg (159 lb)  SpO2 99%  BMI 28.17 kg/m2    Physical Exam   Constitutional: She appears well-developed and well-nourished. She is cooperative.   HENT:   Mouth/Throat: Oropharynx is clear and moist and mucous membranes are normal.   Eyes: Conjunctivae are normal. No scleral icterus.   Cardiovascular: Normal rate.    Pulmonary/Chest: Effort normal.   Abdominal: Soft. There is no tenderness.   Musculoskeletal: Normal range of motion. She exhibits no edema.   Neurological: She is alert.   Skin: Skin is warm and dry. No rash noted.   Psychiatric: She has a normal mood and affect. Judgment normal.         Assessment:  1 year s/p LSG/HHR by SHAWNEE on 1/26/17      ICD-10-CM " ICD-9-CM   1. Fatigue, unspecified type R53.83 780.79   2. Iron deficiency anemia, unspecified iron deficiency anemia type D50.9 280.9   3. Vitamin D deficiency E55.9 268.9   4. Vitamin B1 deficiency E51.9 265.1   5. S/P bariatric surgery Z98.84 V45.86     Plan:  Continue w/ good food choices and healthy habits.  BMR today to determine WLZ.  Adjust calories accordingly.  Follow up w/ dietitian if desired.  Continue routine exercise.  Routine bariatric labs ordered.  Continue vitamins w/ adjustments pending lab results.  Call w/ problems/concerns.     The patient was instructed to follow up in 6 months, sooner if needed.    Total time spent w/ patient 25 minutes and 15 minutes spent counseling the patient on nutrition and necessary dietary/lifestyle modifications.

## 2018-01-24 LAB
25(OH)D3+25(OH)D2 SERPL-MCNC: 38.6 NG/ML (ref 30–100)
A-TOCOPHEROL VIT E SERPL-MCNC: 12.8 MG/L (ref 5.3–16.8)
ALBUMIN SERPL-MCNC: 4.4 G/DL (ref 3.5–5.5)
ALBUMIN/GLOB SERPL: 1.6 {RATIO} (ref 1.2–2.2)
ALP SERPL-CCNC: 48 IU/L (ref 39–117)
ALT SERPL-CCNC: 14 IU/L (ref 0–32)
AST SERPL-CCNC: 16 IU/L (ref 0–40)
BASOPHILS # BLD AUTO: 0.1 X10E3/UL (ref 0–0.2)
BASOPHILS NFR BLD AUTO: 2 %
BILIRUB SERPL-MCNC: 0.3 MG/DL (ref 0–1.2)
BUN SERPL-MCNC: 13 MG/DL (ref 6–24)
BUN/CREAT SERPL: 25 (ref 9–23)
CALCIUM SERPL-MCNC: 9.4 MG/DL (ref 8.7–10.2)
CHLORIDE SERPL-SCNC: 99 MMOL/L (ref 96–106)
CO2 SERPL-SCNC: 27 MMOL/L (ref 18–29)
CREAT SERPL-MCNC: 0.53 MG/DL (ref 0.57–1)
EOSINOPHIL # BLD AUTO: 0.1 X10E3/UL (ref 0–0.4)
EOSINOPHIL NFR BLD AUTO: 1 %
ERYTHROCYTE [DISTWIDTH] IN BLOOD BY AUTOMATED COUNT: 14.5 % (ref 12.3–15.4)
FERRITIN SERPL-MCNC: 278 NG/ML (ref 15–150)
FOLATE SERPL-MCNC: 9.9 NG/ML
GLOBULIN SER CALC-MCNC: 2.7 G/DL (ref 1.5–4.5)
GLUCOSE SERPL-MCNC: 79 MG/DL (ref 65–99)
HCT VFR BLD AUTO: 37.5 % (ref 34–46.6)
HGB BLD-MCNC: 12 G/DL (ref 11.1–15.9)
IMM GRANULOCYTES # BLD: 0 X10E3/UL (ref 0–0.1)
IMM GRANULOCYTES NFR BLD: 0 %
IRON SERPL-MCNC: 35 UG/DL (ref 27–159)
LYMPHOCYTES # BLD AUTO: 1.8 X10E3/UL (ref 0.7–3.1)
LYMPHOCYTES NFR BLD AUTO: 43 %
MAGNESIUM SERPL-MCNC: 1.9 MG/DL (ref 1.6–2.3)
MCH RBC QN AUTO: 28.6 PG (ref 26.6–33)
MCHC RBC AUTO-ENTMCNC: 32 G/DL (ref 31.5–35.7)
MCV RBC AUTO: 89 FL (ref 79–97)
METHYLMALONATE SERPL-SCNC: 55 NMOL/L (ref 0–378)
MONOCYTES # BLD AUTO: 0.4 X10E3/UL (ref 0.1–0.9)
MONOCYTES NFR BLD AUTO: 9 %
NEUTROPHILS # BLD AUTO: 1.9 X10E3/UL (ref 1.4–7)
NEUTROPHILS NFR BLD AUTO: 45 %
PHOSPHATE SERPL-MCNC: 3.9 MG/DL (ref 2.5–4.5)
PLATELET # BLD AUTO: 356 X10E3/UL (ref 150–379)
POTASSIUM SERPL-SCNC: 4.9 MMOL/L (ref 3.5–5.2)
PREALB SERPL-MCNC: 14 MG/DL (ref 12–34)
PROT SERPL-MCNC: 7.1 G/DL (ref 6–8.5)
PTH-INTACT SERPL-MCNC: 36 PG/ML (ref 15–65)
RBC # BLD AUTO: 4.2 X10E6/UL (ref 3.77–5.28)
SODIUM SERPL-SCNC: 140 MMOL/L (ref 134–144)
VIT A SERPL-MCNC: 26 UG/DL (ref 20–65)
VIT B1 BLD-SCNC: 93.8 NMOL/L (ref 66.5–200)
WBC # BLD AUTO: 4.2 X10E3/UL (ref 3.4–10.8)
ZINC SERPL-MCNC: 76 UG/DL (ref 56–134)

## 2018-04-26 ENCOUNTER — HOSPITAL ENCOUNTER (OUTPATIENT)
Dept: ULTRASOUND IMAGING | Facility: HOSPITAL | Age: 48
Discharge: HOME | End: 2018-04-26

## 2018-04-26 ENCOUNTER — TRANSCRIBE ORDERS (OUTPATIENT)
Dept: MAMMOGRAPHY | Facility: HOSPITAL | Age: 48
End: 2018-04-26

## 2018-04-26 ENCOUNTER — HOSPITAL ENCOUNTER (OUTPATIENT)
Dept: MAMMOGRAPHY | Facility: HOSPITAL | Age: 48
Discharge: HOME | End: 2018-04-26
Attending: INTERNAL MEDICINE | Admitting: INTERNAL MEDICINE

## 2018-04-26 DIAGNOSIS — R92.8 ABNORMAL MAMMOGRAM: ICD-10-CM

## 2018-04-26 DIAGNOSIS — R92.8 ABNORMAL MAMMOGRAM: Primary | ICD-10-CM

## 2018-04-26 PROCEDURE — 76642 ULTRASOUND BREAST LIMITED: CPT

## 2018-04-26 PROCEDURE — 77061 BREAST TOMOSYNTHESIS UNI: CPT | Performed by: RADIOLOGY

## 2018-04-26 PROCEDURE — 77065 DX MAMMO INCL CAD UNI: CPT | Performed by: RADIOLOGY

## 2018-04-26 PROCEDURE — 76642 ULTRASOUND BREAST LIMITED: CPT | Performed by: RADIOLOGY

## 2018-04-26 PROCEDURE — G0279 TOMOSYNTHESIS, MAMMO: HCPCS

## 2018-04-26 PROCEDURE — 77065 DX MAMMO INCL CAD UNI: CPT

## 2018-06-19 ENCOUNTER — TELEPHONE (OUTPATIENT)
Dept: OBSTETRICS AND GYNECOLOGY | Facility: CLINIC | Age: 48
End: 2018-06-19

## 2018-06-19 NOTE — TELEPHONE ENCOUNTER
Dr Fozia Núñez PT not scheduled until July but have bleeding, blood clots the size of a nichel mind headaches would like a call back.    PT call back at work [phone]

## 2018-06-24 PROBLEM — D50.9 IRON DEFICIENCY ANEMIA: Status: RESOLVED | Noted: 2017-10-04 | Resolved: 2018-06-24

## 2018-06-24 PROBLEM — Z01.419 WELL WOMAN EXAM WITH ROUTINE GYNECOLOGICAL EXAM: Status: ACTIVE | Noted: 2018-06-24

## 2018-06-27 ENCOUNTER — OFFICE VISIT (OUTPATIENT)
Dept: OBSTETRICS AND GYNECOLOGY | Facility: CLINIC | Age: 48
End: 2018-06-27

## 2018-06-27 VITALS
RESPIRATION RATE: 14 BRPM | BODY MASS INDEX: 27.81 KG/M2 | DIASTOLIC BLOOD PRESSURE: 68 MMHG | SYSTOLIC BLOOD PRESSURE: 102 MMHG | WEIGHT: 157 LBS

## 2018-06-27 DIAGNOSIS — N92.6 IRREGULAR MENSES: Primary | ICD-10-CM

## 2018-06-27 PROCEDURE — 99203 OFFICE O/P NEW LOW 30 MIN: CPT | Performed by: OBSTETRICS & GYNECOLOGY

## 2018-06-27 NOTE — PROGRESS NOTES
Subjective   Chief Complaint   Patient presents with   • Menstrual Problem     Laure Valentin is a 47 y.o. year old .  Patient's last menstrual period was 2018 (approximate).  She presents to be seen because of new onset unpredictable bleeding.  It happened just recently for the first time.  Prior to that her cycles have always been predictably normal.    Her cycles are normally predictable.  On her worst day she changes less than 6 pads or tampons per day.  She has no intermenstrual bleeding.  She is sexually active.  They're using no forms of contraception aside from her prior tubal.  She is mutually monogamous and has no concerns for diseases.    She has no history of prior abnormal Paps but it's been many years since her last Pap smear was done.    OTHER THINGS SHE WANTS TO DISCUSS TODAY:  Nothing else    The following portions of the patient's history were reviewed and updated as appropriate:current medications, allergies, past family history, past medical history, past social history and past surgical history    Smoking status: Never Smoker                                                              Smokeless tobacco: Never Used                        Review of Systems  Constitutional POS: nothing reported    NEG: anorexia or night sweats   Genitourinary POS: nothing reported    NEG: dysuria or hematuria   Gastointestinal POS: nothing reported    NEG: bloating, change in bowel habits, melena or reflux symptoms   Integument POS: nothing reported    NEG: moles that are changing in size, shape, color or rashes   Breast POS: nothing reported    NEG: persistent breast lump, skin dimpling or nipple discharge         Objective   /68   Resp 14   Wt 71.2 kg (157 lb)   LMP 2018 (Approximate)   Breastfeeding? No   BMI 27.81 kg/m²       Lab Review   No data reviewed    Imaging   No data reviewed        Assessment   1. Irregular menses. This is a new finding that does need to be worked up  further     Plan   1. Ultrasound needs to be done after her next menses.   2. The importance of keeping all planned follow-up and taking all medications as prescribed was emphasized.  3. Follow up after ultrasound            This note was electronically signed.    Maged Marcelo M.D.  June 27, 2018    Note: Speech recognition transcription software may have been used to create portions of this document.  An attempt at proofreading has been made but errors in transcription could still be present.    Total time spent today with Laure  was 35 minutes (level 3).  Off this time, 100% was spent face-to-face time coordinating care, answering her questions and counseling regarding pathophysiology of her presenting problem along with plans for any diagnositc work-up and treatment.

## 2018-07-17 ENCOUNTER — HOSPITAL ENCOUNTER (EMERGENCY)
Facility: HOSPITAL | Age: 48
Discharge: HOME | End: 2018-07-17
Attending: EMERGENCY MEDICINE | Admitting: EMERGENCY MEDICINE

## 2018-07-17 VITALS
SYSTOLIC BLOOD PRESSURE: 115 MMHG | HEART RATE: 75 BPM | TEMPERATURE: 98.6 F | RESPIRATION RATE: 16 BRPM | DIASTOLIC BLOOD PRESSURE: 69 MMHG | WEIGHT: 160 LBS | OXYGEN SATURATION: 100 % | BODY MASS INDEX: 28.35 KG/M2 | HEIGHT: 63 IN

## 2018-07-17 DIAGNOSIS — R31.9 URINARY TRACT INFECTION WITH HEMATURIA, SITE UNSPECIFIED: Primary | ICD-10-CM

## 2018-07-17 DIAGNOSIS — N39.0 URINARY TRACT INFECTION WITH HEMATURIA, SITE UNSPECIFIED: Primary | ICD-10-CM

## 2018-07-17 DIAGNOSIS — R10.2 SUPRAPUBIC ABDOMINAL PAIN: ICD-10-CM

## 2018-07-17 LAB
B-HCG UR QL: NEGATIVE
BACTERIA UR QL AUTO: ABNORMAL /HPF
BILIRUB UR QL STRIP: ABNORMAL
CLARITY UR: ABNORMAL
COLOR UR: ABNORMAL
GLUCOSE UR STRIP-MCNC: NEGATIVE MG/DL
HGB UR QL STRIP.AUTO: ABNORMAL
HYALINE CASTS UR QL AUTO: ABNORMAL /LPF
INTERNAL NEGATIVE CONTROL: NEGATIVE
INTERNAL POSITIVE CONTROL: POSITIVE
KETONES UR QL STRIP: NEGATIVE
LEUKOCYTE ESTERASE UR QL STRIP.AUTO: ABNORMAL
Lab: NORMAL
NITRITE UR QL STRIP: POSITIVE
PH UR STRIP.AUTO: <=5 [PH] (ref 5–8)
PROT UR QL STRIP: ABNORMAL
RBC # UR: ABNORMAL /HPF
REF LAB TEST METHOD: ABNORMAL
SP GR UR STRIP: 1.03 (ref 1–1.03)
SQUAMOUS #/AREA URNS HPF: ABNORMAL /HPF
UROBILINOGEN UR QL STRIP: ABNORMAL
WBC UR QL AUTO: ABNORMAL /HPF
YEAST URNS QL MICRO: ABNORMAL /HPF

## 2018-07-17 PROCEDURE — 25010000002 CEFTRIAXONE PER 250 MG: Performed by: PHYSICIAN ASSISTANT

## 2018-07-17 PROCEDURE — 96372 THER/PROPH/DIAG INJ SC/IM: CPT

## 2018-07-17 PROCEDURE — 81001 URINALYSIS AUTO W/SCOPE: CPT | Performed by: EMERGENCY MEDICINE

## 2018-07-17 PROCEDURE — 87086 URINE CULTURE/COLONY COUNT: CPT | Performed by: PHYSICIAN ASSISTANT

## 2018-07-17 PROCEDURE — 81025 URINE PREGNANCY TEST: CPT | Performed by: EMERGENCY MEDICINE

## 2018-07-17 PROCEDURE — 99283 EMERGENCY DEPT VISIT LOW MDM: CPT

## 2018-07-17 RX ORDER — LIDOCAINE HYDROCHLORIDE 10 MG/ML
2.1 INJECTION, SOLUTION EPIDURAL; INFILTRATION; INTRACAUDAL; PERINEURAL ONCE
Status: COMPLETED | OUTPATIENT
Start: 2018-07-17 | End: 2018-07-17

## 2018-07-17 RX ORDER — PHENAZOPYRIDINE HYDROCHLORIDE 200 MG/1
200 TABLET, FILM COATED ORAL 3 TIMES DAILY PRN
Qty: 6 TABLET | Refills: 0 | Status: SHIPPED | OUTPATIENT
Start: 2018-07-17 | End: 2018-07-19

## 2018-07-17 RX ORDER — FLUCONAZOLE 150 MG/1
150 TABLET ORAL ONCE
Qty: 1 TABLET | Refills: 0 | Status: SHIPPED | OUTPATIENT
Start: 2018-07-17 | End: 2018-07-17

## 2018-07-17 RX ORDER — PHENAZOPYRIDINE HYDROCHLORIDE 100 MG/1
200 TABLET, FILM COATED ORAL ONCE
Status: COMPLETED | OUTPATIENT
Start: 2018-07-17 | End: 2018-07-17

## 2018-07-17 RX ORDER — CEFTRIAXONE 1 G/1
1000 INJECTION, POWDER, FOR SOLUTION INTRAMUSCULAR; INTRAVENOUS ONCE
Status: COMPLETED | OUTPATIENT
Start: 2018-07-17 | End: 2018-07-17

## 2018-07-17 RX ORDER — CEFDINIR 300 MG/1
300 CAPSULE ORAL 2 TIMES DAILY
Qty: 20 CAPSULE | Refills: 0 | Status: SHIPPED | OUTPATIENT
Start: 2018-07-17 | End: 2018-07-27

## 2018-07-17 RX ADMIN — PHENAZOPYRIDINE HYDROCHLORIDE 200 MG: 100 TABLET ORAL at 17:33

## 2018-07-17 RX ADMIN — LIDOCAINE HYDROCHLORIDE 2.1 ML: 10 INJECTION, SOLUTION EPIDURAL; INFILTRATION; INTRACAUDAL; PERINEURAL at 17:34

## 2018-07-17 RX ADMIN — CEFTRIAXONE 1000 MG: 1 INJECTION, POWDER, FOR SOLUTION INTRAMUSCULAR; INTRAVENOUS at 17:34

## 2018-07-19 LAB — BACTERIA SPEC AEROBE CULT: NORMAL

## 2018-07-27 ENCOUNTER — OFFICE VISIT (OUTPATIENT)
Dept: OBSTETRICS AND GYNECOLOGY | Facility: CLINIC | Age: 48
End: 2018-07-27

## 2018-07-27 VITALS
DIASTOLIC BLOOD PRESSURE: 68 MMHG | SYSTOLIC BLOOD PRESSURE: 114 MMHG | WEIGHT: 166 LBS | RESPIRATION RATE: 14 BRPM | BODY MASS INDEX: 29.41 KG/M2

## 2018-07-27 DIAGNOSIS — R35.0 URINARY FREQUENCY: ICD-10-CM

## 2018-07-27 DIAGNOSIS — Z12.4 SCREENING FOR CERVICAL CANCER: Primary | ICD-10-CM

## 2018-07-27 PROCEDURE — 99213 OFFICE O/P EST LOW 20 MIN: CPT | Performed by: OBSTETRICS & GYNECOLOGY

## 2018-07-27 NOTE — PROGRESS NOTES
Subjective   Chief Complaint   Patient presents with   • Abnormal Pap Smear     Laure Valentin is a 47 y.o. year old  presenting to be seen for a PAP.  She was recently here as a new patient with complaints of irregular menses.  Ultrasound was done which was unremarkable.  She does have questions today about urinary frequency and possible UTI.  She was seen recently and diagnosed with UTI and placed on antibiotics.  She is finishing his antibiotics today.  Review of those records show what appears to be a UTI based upon urinalysis but the culture showed no growth.  She tells me she was actually on an antibiotic when that urine sample was obtained.  She's been in contact with her urologist who is recommended a cystoscopy if symptoms did not improve.    OTHER THINGS SHE WANTS TO DISCUSS TODAY:  Nothing else     The following portions of the patient's history were reviewed and updated as appropriate:current medications and allergies.    Smoking status: Never Smoker                                                              Smokeless tobacco: Never Used                        Review of Systems  Constitutional POS: nothing reported    NEG: anorexia or night sweats   Genitourinary POS: see HPI    NEG: nocturia      Gastointestinal POS: nothing reported    NEG: bloating, change in bowel habits, melena or reflux symptoms   Integument POS: nothing reported    NEG: moles that are changing in size, shape, color or rashes   Breast POS: nothing reported    NEG: persistent breast lump, skin dimpling or nipple discharge        Objective   /68   Resp 14   Wt 75.3 kg (166 lb)   LMP 2018 (Approximate)   Breastfeeding? No   BMI 29.41 kg/m²     General:  well developed; well nourished  no acute distress   Pelvis: Clinical staff was present for exam  External genitalia:  normal appearance of the external genitalia including Bartholin's and Beclabito's glands.  :  urethral meatus normal;  Vaginal:  normal pink  mucosa without prolapse or lesions.  Cervix:  normal appearance.     Lab Review   No data reviewed    Imaging   No data reviewed       Assessment   1. Encounter for cervical cancer screening  2. Urinary frequency with negative urine culture.  This may be a false negative due to concomitant antibiotic use one specimen was obtained     Plan   1. Pap was done today.  If she does not receive the results of the Pap within 2 weeks  time, she was instructed to call to find out the results.  I explained to Laure that the recommendations for Pap smear interval in a low risk patient's has lengthened to 3 years time.  I encouraged her to be seen yearly for a full physical exam including breast and pelvic exam even during the off years when PAP's will not be performed.  2. If UTI symptoms persist my advice was to repeat urinalysis and culture off antibiotics.  If culture remains negative but symptoms or urinalysis remains abnormal would be candidate for cystoscopy with urology  3. The importance of keeping all planned follow-up and taking all medications as prescribed was emphasized.  4. Follow up for annual exam 1 year           This note was electronically signed.    Maged Marcelo M.D.  July 27, 2018    Note: Speech recognition transcription software may have been used to create portions of this document.  An attempt at proofreading has been made but errors in transcription could still be present.

## 2018-08-08 ENCOUNTER — OFFICE VISIT (OUTPATIENT)
Dept: FAMILY MEDICINE CLINIC | Facility: CLINIC | Age: 48
End: 2018-08-08

## 2018-08-08 ENCOUNTER — TRANSCRIBE ORDERS (OUTPATIENT)
Dept: PHYSICAL THERAPY | Facility: HOSPITAL | Age: 48
End: 2018-08-08

## 2018-08-08 ENCOUNTER — HOSPITAL ENCOUNTER (OUTPATIENT)
Dept: PHYSICAL THERAPY | Facility: HOSPITAL | Age: 48
Setting detail: THERAPIES SERIES
Discharge: HOME | End: 2018-08-08
Attending: ORTHOPAEDIC SURGERY

## 2018-08-08 VITALS
BODY MASS INDEX: 28.46 KG/M2 | HEIGHT: 63 IN | TEMPERATURE: 98.3 F | SYSTOLIC BLOOD PRESSURE: 132 MMHG | DIASTOLIC BLOOD PRESSURE: 82 MMHG | WEIGHT: 160.6 LBS

## 2018-08-08 DIAGNOSIS — Z00.00 ROUTINE MEDICAL EXAM: Primary | ICD-10-CM

## 2018-08-08 DIAGNOSIS — D50.9 IRON DEFICIENCY ANEMIA, UNSPECIFIED IRON DEFICIENCY ANEMIA TYPE: ICD-10-CM

## 2018-08-08 DIAGNOSIS — R23.2 HOT FLASHES: ICD-10-CM

## 2018-08-08 DIAGNOSIS — M25.561 ACUTE PAIN OF BOTH KNEES: Primary | ICD-10-CM

## 2018-08-08 DIAGNOSIS — M25.562 PAIN IN BOTH KNEES, UNSPECIFIED CHRONICITY: Primary | ICD-10-CM

## 2018-08-08 DIAGNOSIS — E55.9 VITAMIN D DEFICIENCY: ICD-10-CM

## 2018-08-08 DIAGNOSIS — M25.562 ACUTE PAIN OF BOTH KNEES: Primary | ICD-10-CM

## 2018-08-08 DIAGNOSIS — R30.0 DYSURIA: ICD-10-CM

## 2018-08-08 DIAGNOSIS — M25.561 PAIN IN BOTH KNEES, UNSPECIFIED CHRONICITY: Primary | ICD-10-CM

## 2018-08-08 PROBLEM — Z98.84 HISTORY OF BARIATRIC SURGERY: Status: ACTIVE | Noted: 2018-08-08

## 2018-08-08 PROCEDURE — 99396 PREV VISIT EST AGE 40-64: CPT | Performed by: PHYSICIAN ASSISTANT

## 2018-08-08 PROCEDURE — 97161 PT EVAL LOW COMPLEX 20 MIN: CPT | Performed by: PHYSICAL THERAPIST

## 2018-08-08 RX ORDER — SULFAMETHOXAZOLE AND TRIMETHOPRIM 800; 160 MG/1; MG/1
TABLET ORAL EVERY 12 HOURS
COMMUNITY
End: 2018-08-08

## 2018-08-08 RX ORDER — PREDNISONE 1 MG/1
5 TABLET ORAL DAILY
COMMUNITY
End: 2018-08-30

## 2018-08-08 RX ORDER — OXYBUTYNIN CHLORIDE 5 MG/1
TABLET ORAL EVERY 8 HOURS
COMMUNITY
End: 2018-08-08

## 2018-08-08 NOTE — THERAPY EVALUATION
Outpatient Physical Therapy Ortho Initial Evaluation  [facility]     Patient Name: Laure Valentin  : 1970  MRN: [id number]  Today's Date: 2018      Visit Date: 2018    Patient Active Problem List   Diagnosis   • Heart murmur   • Annual GYN exam w/o problems        Past Medical History:   Diagnosis Date   • GERD (gastroesophageal reflux disease)     resolved w/ WLS   • History of kidney stones     most recent 17   • Kidney stone    • Microcytic anemia         Past Surgical History:   Procedure Laterality Date   • CERVICAL CERCLAGE     • COLONOSCOPY     • D&C WITH SUCTION     • GASTRIC SLEEVE LAPAROSCOPIC N/A 2017    Procedure: GASTRIC SLEEVE LAPAROSCOPY   • LAPAROSCOPIC TUBAL LIGATION     • SC LAP,ESOPHAGOGAST FUNDOPLASTY N/A 2017    Procedure: HIATAL HERNIA REPAIR LAPAROSCOPIC;  Surgeon: Eulalio Dick MD   • WISDOM TOOTH EXTRACTION         Visit Dx:     ICD-10-CM ICD-9-CM   1. Acute pain of both knees M25.561 338.19    M25.562 719.46             Patient History     Row Name 18 1015             History    Chief Complaint Pain  -LM      Type of Pain Knee pain  -LM      Date Current Problem(s) Began 07/10/18  -LM      Brief Description of Current Complaint Client went on vacation and did a lot of walking.  About 3 days later she noticed the R knee became very swollen.  She has pain in both knees with the R being worse than the L.  With the R knee, pain is inferior to the patella on the top of the tibia.  On the L knee the pain seems to be more posterior.  Has had PT for knee pain ~3 years ago, but pain did resolve.  -LM      Previous treatment for THIS PROBLEM Medication   MD placed pt on steroids  -LM      Patient/Caregiver Goals Relieve pain;Return to prior level of function;Improve mobility;Improve strength  -LM      Hand Dominance right-handed  -LM      Occupation/sports/leisure activities Works here in [employer].  Enjoys exercising  everyday for 2-3 hours 5 days a week - hasn't been able to do that since her knees have been bothering her.  -LM      What clinical tests have you had for this problem? X-ray  -LM      Results of Clinical Tests Per client - states there were arthritic changes and fluid on the R knee.  -LM      History of Previous Related Injuries None  -LM         Pain     Pain Location Knee  -LM      Pain at Present 3  -LM      Pain at Best 0  -LM      Pain at Worst 7  -LM      Pain Frequency Intermittent  -LM      Pain Description Throbbing;Sharp   Sharp in the posterior knee  -LM      What Performance Factors Make the Current Problem(s) WORSE? Pivoting; States when she has been sitting for a while, then goes to stand and push off for gait.  -LM      Is your sleep disturbed? No  -LM      Difficulties at work? Just walking in general increases pain  -LM      Difficulties with ADL's? Able to complete ADLs  -LM      Difficulties with recreational activities? Unable to exercise or has to heavily modify what she does due to pain.  -LM         Fall Risk Assessment    Any falls in the past year: No  -LM      Does patient have a fear of falling No  -LM         Daily Activities    Primary Language English  -LM      How does patient learn best? Listening;Reading;Demonstration  -LM      Pt Participated in POC and Goals Yes  -LM         Safety    Are you being hurt, hit, or frightened by anyone at home or in your life? No  -LM      Are you being neglected by a caregiver No  -LM        User Key  (r) = Recorded By, (t) = Taken By, (c) = Cosigned By    Initials Name Provider Type     Domonique Myers PT Physical Therapist              PT Ortho     Row Name 08/08/18 1015       Posture/Observations    Posture/Observations Comments Palpation: R Knee - Mild TTP inferior patella along superior media and lateral tibial borders; Min TTP to hamstring tendons in center of popliteal fossa.  LLE - Min TTP to hamstring tendons in popliteal fossa.  Crepitus  felt with B knee flexion  -LM       Special Tests/Palpation    Special Tests/Palpation Knee  -LM       Patellar Accessory Motions    Patellar Accessory Motions Tested? Yes  -LM    Superior glide Right:;Left:;WNL  -LM    Inferior glide Right:;Left:;WNL  -LM    Medial glide Right:;Left:;Hypermobile  -LM    Lateral glide Right:;Left:;Hypermobile  -LM    Lateral tilt Right:;Hypermobile  -LM       Knee Special Tests    Lachman’s (ACL lesion) Right:;Left:;Negative  -LM    Valgus stress (MCL lesion) Left:;Negative;Right:;Positive  -LM    Varus stress (LCL lesion) Left:;Right:;Negative  -LM    Yelena’s test (meniscal lesion) Right:;Left:;Negative  -LM       General ROM    RT Lower Ext Rt Knee Extension/Flexion  -LM    LT Lower Ext Lt Knee Extension/Flexion  -LM    GENERAL ROM COMMENTS B Hip/Ankle AROM WFL  -LM       Right Lower Ext    Rt Knee Extension/Flexion AROM 36 - 122   Measured extension with patient trying to complete a LAQ  -LM    Rt Knee Extension/Flexion PROM 12 (ext)  -LM    RT Lower Extremity Comments Pain with extension  -LM       Left Lower Ext    Lt Knee Extension/Flexion AROM 0 - 120  -LM    LT Lower Extremity Comments slight pain in hamstring tendons with knee flex MMT  -LM       MMT (Manual Muscle Testing)    Additional Documentation General Assessment (Manual Muscle Testing) (Group)  -LM       General Assessment (Manual Muscle Testing)    General Manual Muscle Testing (MMT) Assessment lower extremity strength deficits identified  -LM       Lower Extremity (Manual Muscle Testing)    Lower Extremity: Manual Muscle Testing (MMT) left knee strength deficit;right knee strength deficit  -LM       Left Knee (Manual Muscle Testing)    Left Knee Manual Muscle Testing (MMT) extension;flexion  -LM    MMT: Extension, Left Knee extension  -LM    MMT, Gross Movement: Left Knee Extension (4+/5) good plus  -LM    MMT: Flexion, Left Knee flexion  -LM    MMT, Gross Movement: Left Knee Flexion (4-/5) good minus  -LM        Right Knee (Manual Muscle Testing)    Right Knee Manual Muscle Testing (MMT) extension;flexion  -LM    MMT: Extension, Right Knee extension  -LM    MMT, Gross Movement: Right Knee Extension (2+/5) poor plus  -LM    MMT: Flexion, Right Knee flexion  -LM    MMT, Gross Movement: Right Knee Flexion (4-/5) good minus  -LM       Sensation    Sensation WNL? WFL  -LM    Light Touch No apparent deficits  -LM       Girth    Girth Measured? Right Lower Extremity;Left Lower Extremity  -LM       RLE Quick Girth (cm)    Tib tuberosity 35.2 cm  -LM    Mid patella 41 cm  -LM       LLE Quick Girth (cm)    Tib tuberosity 36.5 cm  -LM    Mid patella 41 cm  -LM      User Key  (r) = Recorded By, (t) = Taken By, (c) = Cosigned By    Initials Name Provider Type    Domonique Cuevas, PT Physical Therapist        Therapy Education  Education Details: HEP provided - ther ex includes: SAQ; QS: SLR with QS; Hamstring Stretch; Standing Gastroc/PF Stretch  Given: HEP  Program: New  How Provided: Verbal, Written  Provided to: Patient  Level of Understanding: Verbalized           PT OP Goals     Row Name 08/08/18 1015          PT Short Term Goals    STG Date to Achieve 08/29/18  -LM     STG 1 Independent with HEP to promote ROM/flexibility  -LM     STG 1 Progress New  -LM     STG 2 Improve R knee AROM to 0 degrees  -LM     STG 2 Progress New  -LM     STG 3 Client will report a 25% reduction in frequency/intensity of knee pain  -LM     STG 3 Progress New  -LM        Long Term Goals    LTG Date to Achieve 09/19/18  -LM     LTG 1 Independent of HEP to promote strengthening/stabilization  -LM     LTG 1 Progress New  -LM     LTG 2 Improve LEFS to 75% or better  -LM     LTG 2 Progress New  -LM     LTG 3 Improve R Knee Ext MMT to 4+/5  -LM     LTG 3 Progress New  -LM     LTG 4 Client reports able to ambulate 300 feet or greater with 1-2/10 pain  -LM     LTG 4 Progress New  -LM     LTG 5 Client reports able to return to regular exercise schedule  -LM      LTG 5 Progress New  -LM        Time Calculation    PT Goal Re-Cert Due Date 11/06/18  -LM       User Key  (r) = Recorded By, (t) = Taken By, (c) = Cosigned By    Initials Name Provider Type    Domonique Cuevas, PT Physical Therapist              PT Assessment/Plan     Row Name 08/08/18 1015          PT Assessment    Functional Limitations Impaired gait;Impaired locomotion;Limitation in home management;Limitations in community activities;Performance in leisure activities;Performance in work activities  -LM     Impairments Endurance;Gait;Muscle strength;Pain;Range of motion  -LM     Assessment Comments Client presents with evolving symptoms of low complexity.  Signs and symptoms consistent with B knee pain (R>L)  Skilled PT services warranted to improve ROM, strength and decrease pain in order for client to return to [facility].  -LM     Please refer to paper survey for additional self-reported information Yes  -LM     Rehab Potential Good  -LM     Patient/caregiver participated in establishment of treatment plan and goals Yes  -LM     Patient would benefit from skilled therapy intervention Yes  -LM        PT Plan    PT Frequency 2x/week  -LM     Predicted Duration of Therapy Intervention (Therapy Eval) 12 visits  -LM     Planned CPT's? PT EVAL LOW COMPLEXITY: 92937;PT RE-EVAL: 74141;PT THER PROC EA 15 MIN: 11616;PT MANUAL THERAPY EA 15 MIN: 35634;PT NEUROMUSC RE-EDUCATION EA 15 MIN: 15812;PT GAIT TRAINING EA 15 MIN: 03485;PT ELECTRICAL STIM UNATTEND: ;PT ULTRASOUND EA 15 MIN: 36710;PT HOT/COLD PACK WC NONMCARE: 23392;PT IONTOPHORESIS EA 15 MIN: 61361;PT THER SUPP EA 15 MIN  -LM     PT Plan Comments Begin with gentle ROM/flexibility and low resistance ther ex.  Progress as pt tolerates.  Manual/modalities as needed.  -LM       User Key  (r) = Recorded By, (t) = Taken By, (c) = Cosigned By    Initials Name Provider Type    Domonique Cuevas PT Physical Therapist        Outcome Measure Options: Lower Extremity  Functional Scale (LEFS) (51.25%)  Lower Extremity Functional Index  Any of your usual work, housework or school activities: Moderate difficulty  Your usual hobbies, recreational or sporting activities: Moderate difficulty  Getting into or out of the bath: Moderate difficulty  Walking between rooms: Moderate difficulty  Putting on your shoes or socks: No difficulty  Squatting: A little bit of difficulty  Lifting an object, like a bag of groceries from the floor: No difficulty  Performing light activities around your home: A little bit of difficulty  Performing heavy activities around your home: Moderate difficulty  Getting into or out of a car: No difficulty  Walking 2 blocks: Quite a bit of difficulty  Walking a mile: Quite a bit of difficulty  Going up or down 10 stairs (about 1 flight of stairs): Quite a bit of difficulty  Standing for 1 hour: Moderate difficulty  Sitting for 1 hour: No difficulty  Running on even ground: Extreme difficulty or unable to perform activity  Running on uneven ground: Extreme difficulty or unable to perform activity  Making sharp turns while running fast: Extreme difficulty or unable to perform activity  Hopping: Extreme difficulty or unable to perform activity  Rolling over in bed: No difficulty  Total: 41    Time Calculation:     Therapy Suggested Charges     Code   Minutes Charges    None           Start Time: 1015     Therapy Charges for Today     Code Description Service Date Service Provider Modifiers Qty    48881781388 HC PT EVAL LOW COMPLEXITY 4 8/8/2018 Domonique Myers, PT GP 1        PT G-Codes  Outcome Measure Options: Lower Extremity Functional Scale (LEFS) (51.25%)     Domonique Myers, LARS  8/8/2018

## 2018-08-08 NOTE — PROGRESS NOTES
"Chief Complaint   Patient presents with   • Establish Care       HPI     Laure Valentin is a pleasant 47 y.o. female who is here to establish care and for comprehensive annual exam. Former pt of Dr. Robles but not known to me from [facility]. Patient feels well overall. Works here at [employer] in [employer]. She was evaluated last month by [facility], ER, and urology for pelvic pain and dysuria. Has hx kidney stones and patient initially thought she had a stone but an abdominal CT ordered by urology was negative ([facility]; data deficit). Had negative urine culture but patient was on abx at time of collection. She is feeling much better after a second round of antibiotics but has occasional pressure with urination. Now off antibiotic for at least 1 week. States hematuria is resolved. Saw her GYN on 8/1.     Seeing orthopedist for bilateral knee pain worse after extensive walking last month in [place]. Patient states there were arthritis changes and some \"fluid\" on knee per imaging. Taking prednisone and began PT today.     She reports intermittent hot flashes in the last month. Her GYN told her it may from getting older or prednisone. Had workup at her previous PCP's office last year for similar symptoms which were normal.     Has history of infrequent frontal headaches 2-3 times monthly which are at times accompanied by nausea. These are usually improved with OTC analgesics.     Has history of iron deficiency anemia requiring iron infusions last year. Had bariatric gastric sleeve procedure 1/2017 and has lost 100 pounds. Not currently taking iron supplementation. Has had colonoscopy.     TDAP within last 10 years. Current influenza vaccination, mammogram, PAP, eye exam. Overdue for dental exams.     Past Medical History:   Diagnosis Date   • GERD (gastroesophageal reflux disease)     resolved w/ WLS   • History of kidney stones 2011    most recent 12/24/17   • Kidney stone    • Microcytic anemia    • UTI (urinary tract " infection)        Past Surgical History:   Procedure Laterality Date   • CERVICAL CERCLAGE  2001   • COLONOSCOPY  2011   • D&C WITH SUCTION  1997   • GASTRIC SLEEVE LAPAROSCOPIC N/A 1/26/2017    Procedure: GASTRIC SLEEVE LAPAROSCOPY   • LAPAROSCOPIC TUBAL LIGATION  2002   • [place] LAP,ESOPHAGOGAST FUNDOPLASTY N/A 1/26/2017    Procedure: HIATAL HERNIA REPAIR LAPAROSCOPIC;  Surgeon: Eulalio Dick MD   • WISDOM TOOTH EXTRACTION  1992       Family History   Problem Relation Age of Onset   • Hypertension Mother    • Breast cancer Neg Hx    • Ovarian cancer Neg Hx        Social History     Social History   • Marital status:      Spouse name: N/A   • Number of children: N/A   • Years of education: N/A     Occupational History   • Not on file.     Social History Main Topics   • Smoking status: Never Smoker   • Smokeless tobacco: Never Used   • Alcohol use No   • Drug use: No   • Sexual activity: Not on file     Other Topics Concern   • Not on file     Social History Narrative   • No narrative on file       No Known Allergies    ROS    Review of Systems   Constitutional: Negative.  Negative for appetite change, chills, diaphoresis, fatigue, fever, unexpected weight gain and unexpected weight loss.   HENT: Negative.  Negative for ear pain, hearing loss, nosebleeds, rhinorrhea, sore throat, trouble swallowing and voice change.    Eyes: Negative.  Negative for pain, redness, itching and visual disturbance.   Respiratory: Negative.  Negative for cough, shortness of breath and wheezing.    Cardiovascular: Negative.  Negative for chest pain, palpitations and leg swelling.   Gastrointestinal: Negative.  Negative for abdominal pain, blood in stool, constipation, diarrhea, nausea, vomiting and GERD.   Endocrine: Negative.  Negative for cold intolerance and heat intolerance.   Genitourinary: Positive for dysuria. Negative for urinary incontinence, frequency, hematuria and urgency.   Musculoskeletal: Positive for arthralgias,  joint swelling and myalgias. Negative for gait problem.   Skin: Negative.  Negative for color change, rash and skin lesions.   Allergic/Immunologic: Negative.    Neurological: Positive for headache. Negative for dizziness, seizures, syncope, weakness, light-headedness and numbness.        Negative for paresthesia   Hematological: Negative.  Negative for adenopathy. Does not bruise/bleed easily.   Psychiatric/Behavioral: Negative.  Negative for behavioral problems, sleep disturbance, suicidal ideas and depressed mood. The patient is not nervous/anxious.        Vitals:    08/08/18 1435   BP: 132/82   Temp: 98.3 °F (36.8 °C)         Current Outpatient Prescriptions:   •  Multiple Vitamins-Minerals (MULTIVITAMIN ADULT PO), Take 1 tablet by mouth Daily., Disp: , Rfl:   •  predniSONE (DELTASONE) 5 MG tablet, Take 5 mg by mouth Daily., Disp: , Rfl:     PE    Physical Exam   Constitutional: She appears well-developed and well-nourished. No distress.   HENT:   Head: Normocephalic and atraumatic.   Right Ear: Hearing, tympanic membrane and ear canal normal.   Left Ear: Hearing, tympanic membrane and ear canal normal.   Nose: Nose normal.   Mouth/Throat: Oropharynx is clear and moist and mucous membranes are normal. Normal dentition.   Eyes: Pupils are equal, round, and reactive to light. Conjunctivae and EOM are normal.   Neck: Normal range of motion. Neck supple. No JVD present. Carotid bruit is not present. No thyroid mass and no thyromegaly present.   Cardiovascular: Normal rate, regular rhythm, S1 normal, S2 normal, normal heart sounds and intact distal pulses.    No murmur heard.  Pulmonary/Chest: Effort normal and breath sounds normal.   Abdominal: Soft. Normal appearance and bowel sounds are normal. She exhibits no distension, no abdominal bruit and no mass. There is no hepatosplenomegaly. There is no tenderness.   Genitourinary:   Genitourinary Comments: Sees GYN.    Musculoskeletal: Normal range of motion. She  exhibits no edema.   Lymphadenopathy:     She has no cervical adenopathy.        Right: No inguinal and no supraclavicular adenopathy present.        Left: No inguinal and no supraclavicular adenopathy present.   Neurological: She is alert. She has normal strength. No cranial nerve deficit or sensory deficit. Gait normal.   CN II-XII grossly intact/symmetric.    Skin: Skin is warm and dry. No rash noted. No cyanosis. Nails show no clubbing.   No suspicious lesions.    Psychiatric: She has a normal mood and affect. Her behavior is normal.   Nursing note and vitals reviewed.      Results    A/P    Problem List Items Addressed This Visit        Cardiovascular and Mediastinum    Hot flashes  -Began in the last month. Patient currently taking prednisone.   -Will call office or her GYN if symptoms persist off medication.   -Check TFTs       Digestive    Vitamin D deficiency  -Monitor    Relevant Orders    Vitamin D 25 Hydroxy       Nervous and Auditory    Dysuria  -Much improved but some residual symptoms.   -We will check a UA now that she is off of antibiotics to r/o UTI  -Established with urology. If sx persist, may need cystoscopy.     Relevant Orders    Urinalysis With Culture If Indicated - Urine, Clean Catch       Hematopoietic and Hemostatic    Iron deficiency anemia  -Gastric sleeve 1/2017  -Required iron infusions last summer. No current iron supplementation.   -Check labs today    Relevant Orders    Iron      Other Visit Diagnoses     Routine medical exam    -  Primary  -Patient states she has a copy of her medical records from Dr. Robles's office.   -Counseled to drop them by our office at her convenience.     Relevant Orders    CBC w AUTO Differential    Comprehensive Metabolic Panel    Lipid Panel    TSH          Plan of care reviewed with patient at the conclusion of today's visit. Education was provided regarding diagnosis, management and any prescribed or recommended OTC medications.  Patient verbalizes  understanding of and agreement with management plan.        JAMES Guerrero

## 2018-08-09 NOTE — PROGRESS NOTES
I have reviewed the notes, assessments, and/or procedures performed by JAMES Oliva, I concur with her/his documentation of Laure Valentin.

## 2018-08-10 ENCOUNTER — LAB (OUTPATIENT)
Dept: LAB | Facility: HOSPITAL | Age: 48
End: 2018-08-10

## 2018-08-10 ENCOUNTER — TELEPHONE (OUTPATIENT)
Dept: FAMILY MEDICINE CLINIC | Facility: CLINIC | Age: 48
End: 2018-08-10

## 2018-08-10 DIAGNOSIS — E55.9 VITAMIN D DEFICIENCY: ICD-10-CM

## 2018-08-10 DIAGNOSIS — D50.9 IRON DEFICIENCY ANEMIA, UNSPECIFIED IRON DEFICIENCY ANEMIA TYPE: Primary | ICD-10-CM

## 2018-08-10 DIAGNOSIS — Z00.00 ROUTINE MEDICAL EXAM: ICD-10-CM

## 2018-08-10 DIAGNOSIS — D50.9 IRON DEFICIENCY ANEMIA, UNSPECIFIED IRON DEFICIENCY ANEMIA TYPE: ICD-10-CM

## 2018-08-10 DIAGNOSIS — R30.0 DYSURIA: ICD-10-CM

## 2018-08-10 LAB
25(OH)D3 SERPL-MCNC: 21.8 NG/ML
ALBUMIN SERPL-MCNC: 3.95 G/DL (ref 3.2–4.8)
ALBUMIN/GLOB SERPL: 1.6 G/DL (ref 1.5–2.5)
ALP SERPL-CCNC: 43 U/L (ref 25–100)
ALT SERPL W P-5'-P-CCNC: 30 U/L (ref 7–40)
ANION GAP SERPL CALCULATED.3IONS-SCNC: 3 MMOL/L (ref 3–11)
ARTICHOKE IGE QN: 79 MG/DL (ref 0–130)
AST SERPL-CCNC: 16 U/L (ref 0–33)
BACTERIA UR QL AUTO: ABNORMAL /HPF
BASOPHILS # BLD AUTO: 0.08 10*3/MM3 (ref 0–0.2)
BASOPHILS NFR BLD AUTO: 1.3 % (ref 0–1)
BILIRUB SERPL-MCNC: 0.6 MG/DL (ref 0.3–1.2)
BILIRUB UR QL STRIP: NEGATIVE
BUN BLD-MCNC: 15 MG/DL (ref 9–23)
BUN/CREAT SERPL: 23.1 (ref 7–25)
CALCIUM SPEC-SCNC: 9.2 MG/DL (ref 8.7–10.4)
CHLORIDE SERPL-SCNC: 103 MMOL/L (ref 99–109)
CHOLEST SERPL-MCNC: 144 MG/DL (ref 0–200)
CLARITY UR: ABNORMAL
CO2 SERPL-SCNC: 35 MMOL/L (ref 20–31)
COLOR UR: YELLOW
CREAT BLD-MCNC: 0.65 MG/DL (ref 0.6–1.3)
DEPRECATED RDW RBC AUTO: 45.8 FL (ref 37–54)
EOSINOPHIL # BLD AUTO: 0.07 10*3/MM3 (ref 0–0.3)
EOSINOPHIL NFR BLD AUTO: 1.1 % (ref 0–3)
ERYTHROCYTE [DISTWIDTH] IN BLOOD BY AUTOMATED COUNT: 15.9 % (ref 11.3–14.5)
GFR SERPL CREATININE-BSD FRML MDRD: 119 ML/MIN/1.73
GLOBULIN UR ELPH-MCNC: 2.5 GM/DL
GLUCOSE BLD-MCNC: 88 MG/DL (ref 70–100)
GLUCOSE UR STRIP-MCNC: NEGATIVE MG/DL
HCT VFR BLD AUTO: 29.5 % (ref 34.5–44)
HDLC SERPL-MCNC: 66 MG/DL (ref 40–60)
HGB BLD-MCNC: 9.2 G/DL (ref 11.5–15.5)
HGB UR QL STRIP.AUTO: NEGATIVE
HYALINE CASTS UR QL AUTO: ABNORMAL /LPF
IMM GRANULOCYTES # BLD: 0.01 10*3/MM3 (ref 0–0.03)
IMM GRANULOCYTES NFR BLD: 0.2 % (ref 0–0.6)
IRON 24H UR-MRATE: 20 MCG/DL (ref 50–175)
KETONES UR QL STRIP: NEGATIVE
LEUKOCYTE ESTERASE UR QL STRIP.AUTO: NEGATIVE
LYMPHOCYTES # BLD AUTO: 2.81 10*3/MM3 (ref 0.6–4.8)
LYMPHOCYTES NFR BLD AUTO: 45.1 % (ref 24–44)
MCH RBC QN AUTO: 24.4 PG (ref 27–31)
MCHC RBC AUTO-ENTMCNC: 31.2 G/DL (ref 32–36)
MCV RBC AUTO: 78.2 FL (ref 80–99)
MONOCYTES # BLD AUTO: 0.61 10*3/MM3 (ref 0–1)
MONOCYTES NFR BLD AUTO: 9.8 % (ref 0–12)
NEUTROPHILS # BLD AUTO: 2.66 10*3/MM3 (ref 1.5–8.3)
NEUTROPHILS NFR BLD AUTO: 42.7 % (ref 41–71)
NITRITE UR QL STRIP: NEGATIVE
PH UR STRIP.AUTO: 7 [PH] (ref 5–8)
PLATELET # BLD AUTO: 427 10*3/MM3 (ref 150–450)
PMV BLD AUTO: 10.5 FL (ref 6–12)
POTASSIUM BLD-SCNC: 3.9 MMOL/L (ref 3.5–5.5)
PROT SERPL-MCNC: 6.4 G/DL (ref 5.7–8.2)
PROT UR QL STRIP: NEGATIVE
RBC # BLD AUTO: 3.77 10*6/MM3 (ref 3.89–5.14)
RBC # UR: ABNORMAL /HPF
REF LAB TEST METHOD: ABNORMAL
SODIUM BLD-SCNC: 141 MMOL/L (ref 132–146)
SP GR UR STRIP: 1.02 (ref 1–1.03)
SQUAMOUS #/AREA URNS HPF: ABNORMAL /HPF
TRIGL SERPL-MCNC: 35 MG/DL (ref 0–150)
TSH SERPL DL<=0.05 MIU/L-ACNC: 0.74 MIU/ML (ref 0.35–5.35)
UROBILINOGEN UR QL STRIP: ABNORMAL
WBC NRBC COR # BLD: 6.23 10*3/MM3 (ref 3.5–10.8)
WBC UR QL AUTO: ABNORMAL /HPF

## 2018-08-10 PROCEDURE — 83540 ASSAY OF IRON: CPT

## 2018-08-10 PROCEDURE — 80053 COMPREHEN METABOLIC PANEL: CPT

## 2018-08-10 PROCEDURE — 80061 LIPID PANEL: CPT

## 2018-08-10 PROCEDURE — 82306 VITAMIN D 25 HYDROXY: CPT

## 2018-08-10 PROCEDURE — 84443 ASSAY THYROID STIM HORMONE: CPT

## 2018-08-10 PROCEDURE — 81001 URINALYSIS AUTO W/SCOPE: CPT

## 2018-08-10 PROCEDURE — 36415 COLL VENOUS BLD VENIPUNCTURE: CPT

## 2018-08-10 PROCEDURE — 85025 COMPLETE CBC W/AUTO DIFF WBC: CPT

## 2018-08-10 RX ORDER — FERROUS SULFATE 325(65) MG
325 TABLET ORAL 3 TIMES DAILY
Qty: 90 TABLET | Refills: 0 | Status: SHIPPED | OUTPATIENT
Start: 2018-08-10 | End: 2019-06-07 | Stop reason: SDUPTHER

## 2018-08-14 ENCOUNTER — HOSPITAL ENCOUNTER (OUTPATIENT)
Dept: PHYSICAL THERAPY | Facility: HOSPITAL | Age: 48
Discharge: HOME | End: 2018-08-14

## 2018-08-14 DIAGNOSIS — M25.562 ACUTE PAIN OF BOTH KNEES: Primary | ICD-10-CM

## 2018-08-14 DIAGNOSIS — M25.561 ACUTE PAIN OF BOTH KNEES: Primary | ICD-10-CM

## 2018-08-14 PROCEDURE — 97010 HOT OR COLD PACKS THERAPY: CPT

## 2018-08-14 PROCEDURE — 97140 MANUAL THERAPY 1/> REGIONS: CPT

## 2018-08-14 PROCEDURE — 97110 THERAPEUTIC EXERCISES: CPT

## 2018-08-14 NOTE — THERAPY TREATMENT NOTE
Outpatient Physical Therapy Ortho Treatment Note  [facility]     Patient Name: Laure Valentin  : 1970  MRN: [id number]  Today's Date: 2018      Visit Date: 2018    Visit Dx:    ICD-10-CM ICD-9-CM   1. Acute pain of both knees M25.561 338.19    M25.562 719.46       Patient Active Problem List   Diagnosis   • Heart murmur   • Iron deficiency anemia   • Annual GYN exam w/o problems   • Dysuria   • Vitamin D deficiency   • Hot flashes   • Knee pain, bilateral   • History of bariatric surgery   • Nonintractable episodic headache        Past Medical History:   Diagnosis Date   • GERD (gastroesophageal reflux disease)     resolved w/ WLS   • History of kidney stones     most recent 17   • Kidney stone    • Microcytic anemia    • UTI (urinary tract infection)         Past Surgical History:   Procedure Laterality Date   • CERVICAL CERCLAGE     • COLONOSCOPY     • D&C WITH SUCTION     • GASTRIC SLEEVE LAPAROSCOPIC N/A 2017    Procedure: GASTRIC SLEEVE LAPAROSCOPY   • LAPAROSCOPIC TUBAL LIGATION     • DE LAP,ESOPHAGOGAST FUNDOPLASTY N/A 2017    Procedure: HIATAL HERNIA REPAIR LAPAROSCOPIC;  Surgeon: Eulalio Dick MD   • WISDOM TOOTH EXTRACTION                               PT Assessment/Plan     Row Name 1872          PT Assessment    Assessment Comments Reviewed HEP and provided additional hip/knee strengthening exercises.  Pt c/o mild pain in R knee with SAQ and SLR, and crepitus in L knee with Bosu lunges.  -AC        PT Plan    PT Plan Comments Progress bilateral knee ROM/strengthening as tolerated.  Manual therapy and modalities as needed.  -AC       User Key  (r) = Recorded By, (t) = Taken By, (c) = Cosigned By    Initials Name Provider Type    AC Toshia Segura, PT Physical Therapist                Modalities     Row Name 18 0916             Ice    Ice Applied Yes  -AC      Location Bilateral knees  -AC      Rx Minutes 10 mins   -AC      Ice S/P Rx Yes  -AC        User Key  (r) = Recorded By, (t) = Taken By, (c) = Cosigned By    Initials Name Provider Type    Toshia Guadalupe, PT Physical Therapist                Exercises     Row Name 08/14/18 0917             Subjective Comments    Subjective Comments Pt states her knees are feeling better and believes her swelling has gone down some since previous visit.  -AC         Subjective Pain    Able to rate subjective pain? yes  -AC      Pre-Treatment Pain Level 3  -AC      Post-Treatment Pain Level 1  -AC         Total Minutes    35894 - PT Therapeutic Exercise Minutes 32  -AC      49186 - PT Manual Therapy Minutes 10  -AC         Exercise 1    Exercise Name 1 Exercises performed in clinical setting as per flow sheet.  -AC      Time 1 32 mins  -AC      Additional Comments Ther Ex  -AC        User Key  (r) = Recorded By, (t) = Taken By, (c) = Cosigned By    Initials Name Provider Type    Toshia Guadalupe, PT Physical Therapist                        Manual Rx (last 36 hours)      Manual Treatments     Row Name 08/14/18 0917             Total Minutes    74906 - PT Manual Therapy Minutes 10  -AC         Manual Rx 1    Manual Rx 1 Location Bilateral knees  -AC      Manual Rx 1 Type Tibiofemoral distraction and gentle A-P and P-A glides  -AC      Manual Rx 1 Grade I-II  -AC      Manual Rx 1 Duration 10 mins  -AC        User Key  (r) = Recorded By, (t) = Taken By, (c) = Cosigned By    Initials Name Provider Type    Toshia Guadalupe, PT Physical Therapist                             Time Calculation:   Start Time: 0917  Therapy Suggested Charges     Code   Minutes Charges    34906 (CPT®) Hc Pt Neuromusc Re Education Ea 15 Min      94097 (CPT®) Hc Pt Ther Proc Ea 15 Min 32 2    45543 (CPT®) Hc Gait Training Ea 15 Min      51177 (CPT®) Hc Pt Therapeutic Act Ea 15 Min      30820 (CPT®) Hc Pt Manual Therapy Ea 15 Min 10 1    65234 (CPT®) Hc Pt Ther Massage- Per 15 Min      89449 (CPT®) Hc  Pt Iontophoresis Ea 15 Min      65981 (CPT®) Hc Pt Elec Stim Ea-Per 15 Min      74182 (CPT®) Hc Pt Ultrasound Ea 15 Min      48599 (CPT®) Hc Pt Self Care/Mgmt/Train Ea 15 Min      64846 (CPT®) Hc Pt Prosthetic (S) Train Initial Encounter, Each 15 Min      85913 (CPT®) Hc Orthotic(S) Mgmt/Train Initial Encounter, Each 15min      89158 (CPT®) Hc Pt Aquatic Therapy Ea 15 Min      02826 (CPT®) Hc Pt Orthotic(S)/Prosthetic(S) Encounter, Each 15 Min      Total  42 3        Therapy Charges for Today     Code Description Service Date Service Provider Modifiers Qty    00321392461 HC PT THER PROC EA 15 MIN 8/14/2018 Toshia Segura, PT GP 2    80781786251 HC PT MANUAL THERAPY EA 15 MIN 8/14/2018 Toshia Segura, PT GP 1    44243833597 HC PT HOT/COLD PACK WC NONMCARE 8/14/2018 Toshia Segura, PT GP 1                    Toshia Segura PT  8/14/2018

## 2018-08-16 ENCOUNTER — HOSPITAL ENCOUNTER (OUTPATIENT)
Dept: PHYSICAL THERAPY | Facility: HOSPITAL | Age: 48
Setting detail: THERAPIES SERIES
Discharge: HOME | End: 2018-08-16
Attending: ORTHOPAEDIC SURGERY

## 2018-08-16 DIAGNOSIS — M25.562 ACUTE PAIN OF BOTH KNEES: Primary | ICD-10-CM

## 2018-08-16 DIAGNOSIS — M25.561 ACUTE PAIN OF BOTH KNEES: Primary | ICD-10-CM

## 2018-08-16 PROCEDURE — 97140 MANUAL THERAPY 1/> REGIONS: CPT

## 2018-08-16 NOTE — THERAPY TREATMENT NOTE
Outpatient Physical Therapy Ortho Treatment Note  [facility]     Patient Name: Laure Valentin  : 1970  MRN: [id number]  Today's Date: 2018      Visit Date: 2018    Visit Dx:    ICD-10-CM ICD-9-CM   1. Acute pain of both knees M25.561 338.19    M25.562 719.46       Patient Active Problem List   Diagnosis   • Heart murmur   • Iron deficiency anemia   • Annual GYN exam w/o problems   • Dysuria   • Vitamin D deficiency   • Hot flashes   • Knee pain, bilateral   • History of bariatric surgery   • Nonintractable episodic headache        Past Medical History:   Diagnosis Date   • GERD (gastroesophageal reflux disease)     resolved w/ WLS   • History of kidney stones     most recent 17   • Kidney stone    • Microcytic anemia    • UTI (urinary tract infection)         Past Surgical History:   Procedure Laterality Date   • CERVICAL CERCLAGE     • COLONOSCOPY     • D&C WITH SUCTION     • GASTRIC SLEEVE LAPAROSCOPIC N/A 2017    Procedure: GASTRIC SLEEVE LAPAROSCOPY   • LAPAROSCOPIC TUBAL LIGATION     • [place] LAP,ESOPHAGOGAST FUNDOPLASTY N/A 2017    Procedure: HIATAL HERNIA REPAIR LAPAROSCOPIC;  Surgeon: Eulalio Dick MD   • WISDOM TOOTH EXTRACTION               PT Assessment/Plan     Row Name 18 1030          PT Assessment    Assessment Comments Client tolerated ASTYM very well and without complaints.  Overall she still has some knee discomfort.  She notes some discomfort posteriorly with activity.  -MM        PT Plan    PT Plan Comments Continue per plan of treatment with manual therapy and exercises.  -MM       User Key  (r) = Recorded By, (t) = Taken By, (c) = Cosigned By    Initials Name Provider Type    Ramón Alegria, PT Physical Therapist                    Exercises     Row Name 18 1030             Subjective Comments    Subjective Comments Client reports moderate knee pain overall.  She reports popping discomfort around the posterior  aspect of her knee with activity.  -MM         Subjective Pain    Able to rate subjective pain? yes  -MM      Pre-Treatment Pain Level 5  -MM      Post-Treatment Pain Level 3  -MM         Total Minutes    06706 - PT Therapeutic Exercise Minutes 5  -MM      10067 - PT Manual Therapy Minutes 25  -MM         Exercise 1    Exercise Name 1 Included NuStep crosstrainer, quad stretch, hamstring stretch.  -MM      Time 1 5  -MM        User Key  (r) = Recorded By, (t) = Taken By, (c) = Cosigned By    Initials Name Provider Type    Ramón Alegria, PT Physical Therapist             Manual Rx (last 36 hours)      Manual Treatments     Row Name 08/16/18 1030             Total Minutes    71903 - PT Manual Therapy Minutes 25  -MM         Manual Rx 1    Manual Rx 1 Location Bilateral knees anterior and posterior  -MM      Manual Rx 1 Type Provided soft tissue mobilization using ASTYM technique.  Moderate pressure was used with ASTYM.  -MM      Manual Rx 1 Duration 25  -MM        User Key  (r) = Recorded By, (t) = Taken By, (c) = Cosigned By    Initials Name Provider Type    Ramón Alegria, PT Physical Therapist          Time Calculation:   Start Time: 1030  Therapy Suggested Charges     Code   Minutes Charges    17132 (CPT®) Hc Pt Neuromusc Re Education Ea 15 Min      69678 (CPT®) Hc Pt Ther Proc Ea 15 Min 5     69709 (CPT®) Hc Gait Training Ea 15 Min      47156 (CPT®) Hc Pt Therapeutic Act Ea 15 Min      43492 (CPT®) Hc Pt Manual Therapy Ea 15 Min 25 2    90360 (CPT®) Hc Pt Ther Massage- Per 15 Min      79106 (CPT®) Hc Pt Iontophoresis Ea 15 Min      99428 (CPT®) Hc Pt Elec Stim Ea-Per 15 Min      45113 (CPT®) Hc Pt Ultrasound Ea 15 Min      83488 (CPT®) Hc Pt Self Care/Mgmt/Train Ea 15 Min      19604 (CPT®) Hc Pt Prosthetic (S) Train Initial Encounter, Each 15 Min      38602 (CPT®) Hc Orthotic(S) Mgmt/Train Initial Encounter, Each 15min      00257 (CPT®) Hc Pt Aquatic Therapy Ea 15 Min      68842 (CPT®) Hc Pt  Orthotic(S)/Prosthetic(S) Encounter, Each 15 Min      Total  30 2        Therapy Charges for Today     Code Description Service Date Service Provider Modifiers Qty    80197334806 HC PT MANUAL THERAPY EA 15 MIN 8/16/2018 Ramón Turpin, PT GP 2                    Ramón Turpin, PT  8/16/2018

## 2018-08-17 DIAGNOSIS — R31.29 MICROSCOPIC HEMATURIA: Primary | ICD-10-CM

## 2018-08-17 DIAGNOSIS — R89.9 ABNORMAL LABORATORY TEST: ICD-10-CM

## 2018-08-17 RX ORDER — CHOLECALCIFEROL (VITAMIN D3) 125 MCG
2000 CAPSULE ORAL DAILY
Qty: 90 TABLET | Refills: 3 | Status: SHIPPED | OUTPATIENT
Start: 2018-08-17 | End: 2018-08-30

## 2018-08-21 ENCOUNTER — HOSPITAL ENCOUNTER (OUTPATIENT)
Dept: PHYSICAL THERAPY | Facility: HOSPITAL | Age: 48
Setting detail: THERAPIES SERIES
Discharge: HOME | End: 2018-08-21
Attending: ORTHOPAEDIC SURGERY

## 2018-08-21 DIAGNOSIS — M25.562 ACUTE PAIN OF BOTH KNEES: Primary | ICD-10-CM

## 2018-08-21 DIAGNOSIS — M25.561 ACUTE PAIN OF BOTH KNEES: Primary | ICD-10-CM

## 2018-08-21 PROCEDURE — 97140 MANUAL THERAPY 1/> REGIONS: CPT

## 2018-08-21 PROCEDURE — 97110 THERAPEUTIC EXERCISES: CPT

## 2018-08-21 NOTE — THERAPY TREATMENT NOTE
Outpatient Physical Therapy Ortho Treatment Note  [facility]     Patient Name: Laure Valentin  : 1970  MRN: [id number]  Today's Date: 2018      Visit Date: 2018    Visit Dx:    ICD-10-CM ICD-9-CM   1. Acute pain of both knees M25.561 338.19    M25.562 719.46       Patient Active Problem List   Diagnosis   • Heart murmur   • Iron deficiency anemia   • Annual GYN exam w/o problems   • Dysuria   • Vitamin D insufficiency   • Hot flashes   • Knee pain, bilateral   • History of bariatric surgery   • Nonintractable episodic headache   • Microscopic hematuria        Past Medical History:   Diagnosis Date   • GERD (gastroesophageal reflux disease)     resolved w/ WLS   • History of kidney stones     most recent 17   • Kidney stone    • Microcytic anemia    • UTI (urinary tract infection)         Past Surgical History:   Procedure Laterality Date   • CERVICAL CERCLAGE     • COLONOSCOPY     • D&C WITH SUCTION     • GASTRIC SLEEVE LAPAROSCOPIC N/A 2017    Procedure: GASTRIC SLEEVE LAPAROSCOPY   • LAPAROSCOPIC TUBAL LIGATION     • [place] LAP,ESOPHAGOGAST FUNDOPLASTY N/A 2017    Procedure: HIATAL HERNIA REPAIR LAPAROSCOPIC;  Surgeon: Eulalio Dick MD   • WISDOM TOOTH EXTRACTION               PT Assessment/Plan     Row Name 18 1115          PT Assessment    Assessment Comments some improvement in pain. Exercises and ASTYM were tolerated without complaints.   -MM        PT Plan    PT Plan Comments Continue per plan of treatment at 2x/week.   -MM       User Key  (r) = Recorded By, (t) = Taken By, (c) = Cosigned By    Initials Name Provider Type    MM Ramón Turpin, PT Physical Therapist                    Exercises     Row Name 18 1114             Subjective Comments    Subjective Comments Client reports some knee pain today. she felt a little better after ASTYM last visit.   -MM         Subjective Pain    Able to rate subjective pain? yes  -MM       Pre-Treatment Pain Level 4  -MM      Post-Treatment Pain Level 3  -MM         Total Minutes    87375 - PT Therapeutic Exercise Minutes 10  -MM      84047 - PT Manual Therapy Minutes 20  -MM         Exercise 1    Exercise Name 1 Included Nu-step crosstrainer, leg press ( 6 plates), hamstring curls ( 2 plates), and hamstring stretches.   -MM      Cueing 1 Verbal  -MM      Time 1 10  -MM      Additional Comments ther ex  -MM        User Key  (r) = Recorded By, (t) = Taken By, (c) = Cosigned By    Initials Name Provider Type    Ramón Alegria, PT Physical Therapist                        Manual Rx (last 36 hours)      Manual Treatments     Row Name 08/21/18 1115             Total Minutes    22447 - PT Manual Therapy Minutes 20  -MM         Manual Rx 1    Manual Rx 1 Location Bilateral knees anterior and posterior   -MM      Manual Rx 1 Type Provided soft tissue mobilization using ASTYM technique. Moderate pressure was used with ASTYM.   -MM      Manual Rx 1 Duration 20  -MM        User Key  (r) = Recorded By, (t) = Taken By, (c) = Cosigned By    Initials Name Provider Type    Ramón Alegria, PT Physical Therapist                             Time Calculation:   Start Time: 1115  Therapy Suggested Charges     Code   Minutes Charges    13829 (CPT®) Hc Pt Neuromusc Re Education Ea 15 Min      63558 (CPT®) Hc Pt Ther Proc Ea 15 Min 10 1    41967 (CPT®) Hc Gait Training Ea 15 Min      96645 (CPT®) Hc Pt Therapeutic Act Ea 15 Min      86607 (CPT®) Hc Pt Manual Therapy Ea 15 Min 20 1    49211 (CPT®) Hc Pt Ther Massage- Per 15 Min      63647 (CPT®) Hc Pt Iontophoresis Ea 15 Min      83713 (CPT®) Hc Pt Elec Stim Ea-Per 15 Min      51998 (CPT®) Hc Pt Ultrasound Ea 15 Min      54387 (CPT®) Hc Pt Self Care/Mgmt/Train Ea 15 Min      59761 (CPT®) Hc Pt Prosthetic (S) Train Initial Encounter, Each 15 Min      86320 (CPT®) Hc Orthotic(S) Mgmt/Train Initial Encounter, Each 15min      61226 (CPT®) Hc Pt Aquatic Therapy Ea 15  Min      17637 (CPT®)  Pt Orthotic(S)/Prosthetic(S) Encounter, Each 15 Min      Total  30 2        Therapy Charges for Today     Code Description Service Date Service Provider Modifiers Qty    68386399116  PT THER PROC EA 15 MIN 8/21/2018 Ramón Turpin, PT GP 1    69935110206  PT MANUAL THERAPY EA 15 MIN 8/21/2018 Ramón Turpin, PT GP 1                    Ramón Turpin, PT  8/21/2018

## 2018-08-22 ENCOUNTER — TRANSCRIBE ORDERS (OUTPATIENT)
Dept: ADMINISTRATIVE | Facility: HOSPITAL | Age: 48
End: 2018-08-22

## 2018-08-22 DIAGNOSIS — M25.461 SWELLING OF RIGHT KNEE JOINT: Primary | ICD-10-CM

## 2018-08-23 ENCOUNTER — HOSPITAL ENCOUNTER (OUTPATIENT)
Dept: PHYSICAL THERAPY | Facility: HOSPITAL | Age: 48
Setting detail: THERAPIES SERIES
Discharge: HOME | End: 2018-08-23
Attending: ORTHOPAEDIC SURGERY

## 2018-08-23 DIAGNOSIS — M25.562 ACUTE PAIN OF BOTH KNEES: Primary | ICD-10-CM

## 2018-08-23 DIAGNOSIS — M25.561 ACUTE PAIN OF BOTH KNEES: Primary | ICD-10-CM

## 2018-08-23 PROCEDURE — 97110 THERAPEUTIC EXERCISES: CPT

## 2018-08-23 PROCEDURE — 97140 MANUAL THERAPY 1/> REGIONS: CPT

## 2018-08-23 NOTE — THERAPY TREATMENT NOTE
Outpatient Physical Therapy Ortho Treatment Note  [facility]     Patient Name: Laure Valentin  : 1970  MRN: [id number]  Today's Date: 2018      Visit Date: 2018    Visit Dx:    ICD-10-CM ICD-9-CM   1. Acute pain of both knees M25.561 338.19    M25.562 719.46       Patient Active Problem List   Diagnosis   • Heart murmur   • Iron deficiency anemia   • Annual GYN exam w/o problems   • Dysuria   • Vitamin D insufficiency   • Hot flashes   • Knee pain, bilateral   • History of bariatric surgery   • Nonintractable episodic headache   • Microscopic hematuria        Past Medical History:   Diagnosis Date   • GERD (gastroesophageal reflux disease)     resolved w/ WLS   • History of kidney stones     most recent 17   • Kidney stone    • Microcytic anemia    • UTI (urinary tract infection)         Past Surgical History:   Procedure Laterality Date   • CERVICAL CERCLAGE     • COLONOSCOPY     • D&C WITH SUCTION     • GASTRIC SLEEVE LAPAROSCOPIC N/A 2017    Procedure: GASTRIC SLEEVE LAPAROSCOPY   • LAPAROSCOPIC TUBAL LIGATION     • [place] LAP,ESOPHAGOGAST FUNDOPLASTY N/A 2017    Procedure: HIATAL HERNIA REPAIR LAPAROSCOPIC;  Surgeon: Eulalio Dick MD   • WISDOM TOOTH EXTRACTION                               PT Assessment/Plan     Row Name 18 2641          PT Assessment    Assessment Comments client tolerates exercises in the clinic well. She continues to notice some knee pain with mild swelling. No complaints with ASTYM.   -MM        PT Plan    PT Plan Comments Client is scheduled for MRI and she plans to hold PT until she gets those results.   -MM       User Key  (r) = Recorded By, (t) = Taken By, (c) = Cosigned By    Initials Name Provider Type    Ramón Alegria, PT Physical Therapist                    Exercises     Row Name 18 111             Subjective Comments    Subjective Comments Client reports that she continues with some pain. Pain  at the time of treatment today is rated at 3/10.   -MM         Subjective Pain    Able to rate subjective pain? yes  -MM      Pre-Treatment Pain Level 3  -MM      Post-Treatment Pain Level 3  -MM         Total Minutes    05093 - PT Therapeutic Exercise Minutes 15  -MM      54972 - PT Manual Therapy Minutes 15  -MM         Exercise 1    Exercise Name 1 Included nu-step, leg press, bridge and curl, hamstring stretch, and quad stretch.   -MM      Cueing 1 Verbal  -MM      Time 1 15  -MM      Additional Comments ther ex  -MM        User Key  (r) = Recorded By, (t) = Taken By, (c) = Cosigned By    Initials Name Provider Type    Ramón Alegria, PT Physical Therapist                        Manual Rx (last 36 hours)      Manual Treatments     Row Name 08/23/18 1115             Total Minutes    82249 - PT Manual Therapy Minutes 15  -MM         Manual Rx 1    Manual Rx 1 Location Bilateral knees anterior and posterior   -MM      Manual Rx 1 Type Provided soft tissue mobilization using ASTYM technique. Moderate pressure was used with ASTYM.   -MM      Manual Rx 1 Duration 15  -MM        User Key  (r) = Recorded By, (t) = Taken By, (c) = Cosigned By    Initials Name Provider Type    Ramón Alegria, PT Physical Therapist                             Time Calculation:   Start Time: 1115  Therapy Suggested Charges     Code   Minutes Charges    83081 (CPT®) Hc Pt Neuromusc Re Education Ea 15 Min      63698 (CPT®) Hc Pt Ther Proc Ea 15 Min 15 1    89730 (CPT®) Hc Gait Training Ea 15 Min      57954 (CPT®) Hc Pt Therapeutic Act Ea 15 Min      72784 (CPT®) Hc Pt Manual Therapy Ea 15 Min 15 1    94403 (CPT®) Hc Pt Ther Massage- Per 15 Min      10874 (CPT®) Hc Pt Iontophoresis Ea 15 Min      10921 (CPT®) Hc Pt Elec Stim Ea-Per 15 Min      05241 (CPT®) Hc Pt Ultrasound Ea 15 Min      14482 (CPT®)  Pt Self Care/Mgmt/Train Ea 15 Min      42357 (CPT®)  Pt Prosthetic (S) Train Initial Encounter, Each 15 Min      21525 (CPT®)   Orthotic(S) Mgmt/Train Initial Encounter, Each 15min      31757 (CPT®) Hc Pt Aquatic Therapy Ea 15 Min      84641 (CPT®) Hc Pt Orthotic(S)/Prosthetic(S) Encounter, Each 15 Min      Total  30 2        Therapy Charges for Today     Code Description Service Date Service Provider Modifiers Qty    44908013745 HC PT THER PROC EA 15 MIN 8/23/2018 Ramón Turpin, PT GP 1    74837613590 HC PT MANUAL THERAPY EA 15 MIN 8/23/2018 Ramón Turpin, PT GP 1                    Ramón Turpin, PT  8/23/2018

## 2018-08-30 ENCOUNTER — OFFICE VISIT (OUTPATIENT)
Dept: BARIATRICS/WEIGHT MGMT | Facility: CLINIC | Age: 48
End: 2018-08-30

## 2018-08-30 VITALS
SYSTOLIC BLOOD PRESSURE: 100 MMHG | HEIGHT: 63 IN | TEMPERATURE: 98 F | HEART RATE: 76 BPM | OXYGEN SATURATION: 99 % | BODY MASS INDEX: 27.55 KG/M2 | DIASTOLIC BLOOD PRESSURE: 80 MMHG | RESPIRATION RATE: 18 BRPM | WEIGHT: 155.51 LBS

## 2018-08-30 DIAGNOSIS — K91.2 POSTGASTRECTOMY MALABSORPTION: ICD-10-CM

## 2018-08-30 DIAGNOSIS — D50.9 MICROCYTIC ANEMIA: ICD-10-CM

## 2018-08-30 DIAGNOSIS — E78.00 PURE HYPERCHOLESTEROLEMIA: ICD-10-CM

## 2018-08-30 DIAGNOSIS — K21.9 GASTROESOPHAGEAL REFLUX DISEASE WITHOUT ESOPHAGITIS: ICD-10-CM

## 2018-08-30 DIAGNOSIS — Z13.0 SCREENING, IRON DEFICIENCY ANEMIA: ICD-10-CM

## 2018-08-30 DIAGNOSIS — D50.9 IRON DEFICIENCY ANEMIA, UNSPECIFIED IRON DEFICIENCY ANEMIA TYPE: ICD-10-CM

## 2018-08-30 DIAGNOSIS — E55.9 HYPOVITAMINOSIS D: ICD-10-CM

## 2018-08-30 DIAGNOSIS — Z98.84 STATUS POST BARIATRIC SURGERY: ICD-10-CM

## 2018-08-30 DIAGNOSIS — Z90.3 POSTGASTRECTOMY MALABSORPTION: ICD-10-CM

## 2018-08-30 DIAGNOSIS — Z13.21 MALNUTRITION SCREEN: ICD-10-CM

## 2018-08-30 DIAGNOSIS — R53.83 FATIGUE, UNSPECIFIED TYPE: Primary | ICD-10-CM

## 2018-08-30 PROCEDURE — 99214 OFFICE O/P EST MOD 30 MIN: CPT | Performed by: SURGERY

## 2018-08-30 NOTE — PROGRESS NOTES
[facility]  [address]  [facility] [address]  [phone]        Patient Name:  Laure Valentin.  :  1970      Date of Visit: 2018      Reason for Visit:   18 months postop    HPI: Laure Valentin is a 47 y.o. female s/p LSG/HHR by [facility] on 17    Doing well.  No issues/concerns. Denies dysphagia, reflux, nausea, vomiting and abdominal pain.  Getting 100+g prot/day.  Drinking 64+ fluid oz/day. Dx with low iron per PCP.  Has actually received iron infusions in the past.   Taking MVI and iron BID-TID.  Exercise: 2-3 hours per day, 5 days per week.  Included Arnaldo, Kettlebell, and Hip hop, but injured knee, pending MRI.  Has tried steroids and NSAIDs for swelling.      Had UTI 1 month ago.  Actually saw a urologist.  Hx of nephrolithiasis, but none last month.       Presurgery weight: 256 pounds.  Today's weight is 70.5 kg (155 lb 8.2 oz) pounds, today's  Body mass index is 27.55 kg/m²., and her weight loss since surgery is 101 pounds.      Past Medical History:   Diagnosis Date   • GERD (gastroesophageal reflux disease)     resolved w/ WLS   • History of kidney stones     most recent 17   • Kidney stone    • Microcytic anemia    • UTI (urinary tract infection)      Past Surgical History:   Procedure Laterality Date   • CERVICAL CERCLAGE     • COLONOSCOPY     • D&C WITH SUCTION     • GASTRIC SLEEVE LAPAROSCOPIC N/A 2017    Procedure: GASTRIC SLEEVE LAPAROSCOPY   • LAPAROSCOPIC TUBAL LIGATION     • NM LAP,ESOPHAGOGAST FUNDOPLASTY N/A 2017    Procedure: HIATAL HERNIA REPAIR LAPAROSCOPIC;  Surgeon: Eulalio Dick MD   • WISDOM TOOTH EXTRACTION       Outpatient Prescriptions Marked as Taking for the 18 encounter (Office Visit) with Britt Schafer MD   Medication Sig Dispense Refill   • ferrous sulfate 325 (65 FE) MG tablet Take 1 tablet by mouth 3 (Three) Times a Day. 90 tablet 0   • Multiple  "Vitamins-Minerals (MULTIVITAMIN ADULT PO) Take 1 tablet by mouth Daily.         No Known Allergies    Social History     Social History   • Marital status:      Spouse name: N/A   • Number of children: N/A   • Years of education: N/A     Occupational History   • Not on file.     Social History Main Topics   • Smoking status: Never Smoker   • Smokeless tobacco: Never Used   • Alcohol use No   • Drug use: No   • Sexual activity: Not on file     Other Topics Concern   • Not on file     Social History Narrative   • No narrative on file       /80 (BP Location: Left arm, Patient Position: Sitting, Cuff Size: Large Adult)   Pulse 76   Temp 98 °F (36.7 °C) (Temporal Artery )   Resp 18   Ht 160 cm (63\")   Wt 70.5 kg (155 lb 8.2 oz)   SpO2 99%   BMI 27.55 kg/m²     Physical Exam   Constitutional: She is oriented to person, place, and time. She appears well-developed and well-nourished. No distress.   HENT:   Head: Normocephalic and atraumatic.   Mouth/Throat: No oropharyngeal exudate.   Eyes: Pupils are equal, round, and reactive to light. Conjunctivae and EOM are normal.   Pulmonary/Chest: Effort normal. No respiratory distress.   Abdominal: Soft. She exhibits no distension.   Neurological: She is alert and oriented to person, place, and time. No cranial nerve deficit.   Skin: Skin is warm and dry. No rash noted. She is not diaphoretic. No erythema.   Psychiatric: She has a normal mood and affect. Her behavior is normal. Judgment and thought content normal.         Assessment: 18 months s/p LSG/HHR by [facility] on 1/26/17    ICD-10-CM ICD-9-CM   1. Fatigue, unspecified type R53.83 780.79   2. Iron deficiency anemia, unspecified iron deficiency anemia type D50.9 280.9   3. Hypovitaminosis D E55.9 268.9   4. Postgastrectomy malabsorption K91.2 579.3    Z90.3    5. Screening, iron deficiency anemia Z13.0 V78.0   6. Malnutrition screen Z13.21 V77.2   7. Status post bariatric surgery Z98.84 V45.86   8. Microcytic " anemia D50.9 280.9   9. Pure hypercholesterolemia E78.00 272.0   10. Gastroesophageal reflux disease without esophagitis K21.9 530.81         Plan:  Doing well. Continue w/ good food choices and healthy habits.  Continue protein >70g/day.  Continue routine exercise.  Routine bariatric labs ordered.  Continue vitamins w/ adjustments pending lab results.  Call w/ problems/concerns.     The patient was instructed to follow up in 6 months, sooner if needed.    note: approx 15 of the 25 minute visit was spent counseling on nutrition and necessary dietary/lifestyle modifications.    Britt Schafer MD

## 2018-08-31 ENCOUNTER — HOSPITAL ENCOUNTER (OUTPATIENT)
Dept: MRI IMAGING | Facility: HOSPITAL | Age: 48
Discharge: HOME | End: 2018-08-31
Attending: ORTHOPAEDIC SURGERY | Admitting: ORTHOPAEDIC SURGERY

## 2018-08-31 DIAGNOSIS — M25.461 SWELLING OF RIGHT KNEE JOINT: ICD-10-CM

## 2018-08-31 PROCEDURE — 73721 MRI JNT OF LWR EXTRE W/O DYE: CPT

## 2018-09-13 ENCOUNTER — LAB (OUTPATIENT)
Dept: LAB | Facility: HOSPITAL | Age: 48
End: 2018-09-13

## 2018-09-13 ENCOUNTER — TRANSCRIBE ORDERS (OUTPATIENT)
Dept: LAB | Facility: HOSPITAL | Age: 48
End: 2018-09-13

## 2018-09-13 DIAGNOSIS — R53.83 FATIGUE, UNSPECIFIED TYPE: ICD-10-CM

## 2018-09-13 DIAGNOSIS — R53.83 FATIGUE, UNSPECIFIED TYPE: Primary | ICD-10-CM

## 2018-09-13 LAB
ALBUMIN SERPL-MCNC: 4.26 G/DL (ref 3.2–4.8)
ALBUMIN/GLOB SERPL: 1.7 G/DL (ref 1.5–2.5)
ALP SERPL-CCNC: 43 U/L (ref 25–100)
ALT SERPL W P-5'-P-CCNC: 26 U/L (ref 7–40)
ANION GAP SERPL CALCULATED.3IONS-SCNC: 4 MMOL/L (ref 3–11)
AST SERPL-CCNC: 24 U/L (ref 0–33)
BASOPHILS # BLD AUTO: 0.09 10*3/MM3 (ref 0–0.2)
BASOPHILS NFR BLD AUTO: 1.6 % (ref 0–1)
BILIRUB SERPL-MCNC: 0.4 MG/DL (ref 0.3–1.2)
BUN BLD-MCNC: 15 MG/DL (ref 9–23)
BUN/CREAT SERPL: 25.4 (ref 7–25)
CALCIUM SPEC-SCNC: 9.2 MG/DL (ref 8.7–10.4)
CHLORIDE SERPL-SCNC: 105 MMOL/L (ref 99–109)
CO2 SERPL-SCNC: 30 MMOL/L (ref 20–31)
CREAT BLD-MCNC: 0.59 MG/DL (ref 0.6–1.3)
DEPRECATED RDW RBC AUTO: 49.3 FL (ref 37–54)
EOSINOPHIL # BLD AUTO: 0.07 10*3/MM3 (ref 0–0.3)
EOSINOPHIL NFR BLD AUTO: 1.2 % (ref 0–3)
ERYTHROCYTE [DISTWIDTH] IN BLOOD BY AUTOMATED COUNT: 17.6 % (ref 11.3–14.5)
FERRITIN SERPL-MCNC: 6 NG/ML (ref 10–291)
FOLATE SERPL-MCNC: 10.95 NG/ML (ref 3.2–20)
GFR SERPL CREATININE-BSD FRML MDRD: 133 ML/MIN/1.73
GLOBULIN UR ELPH-MCNC: 2.5 GM/DL
GLUCOSE BLD-MCNC: 100 MG/DL (ref 70–100)
HCT VFR BLD AUTO: 30 % (ref 34.5–44)
HGB BLD-MCNC: 9.2 G/DL (ref 11.5–15.5)
IMM GRANULOCYTES # BLD: 0.01 10*3/MM3 (ref 0–0.03)
IMM GRANULOCYTES NFR BLD: 0.2 % (ref 0–0.6)
LYMPHOCYTES # BLD AUTO: 2.18 10*3/MM3 (ref 0.6–4.8)
LYMPHOCYTES NFR BLD AUTO: 38.9 % (ref 24–44)
MCH RBC QN AUTO: 23.4 PG (ref 27–31)
MCHC RBC AUTO-ENTMCNC: 30.7 G/DL (ref 32–36)
MCV RBC AUTO: 76.1 FL (ref 80–99)
MONOCYTES # BLD AUTO: 0.59 10*3/MM3 (ref 0–1)
MONOCYTES NFR BLD AUTO: 10.5 % (ref 0–12)
NEUTROPHILS # BLD AUTO: 2.68 10*3/MM3 (ref 1.5–8.3)
NEUTROPHILS NFR BLD AUTO: 47.8 % (ref 41–71)
PLATELET # BLD AUTO: 483 10*3/MM3 (ref 150–450)
PMV BLD AUTO: 10.3 FL (ref 6–12)
POTASSIUM BLD-SCNC: 4.6 MMOL/L (ref 3.5–5.5)
PREALB SERPL-MCNC: 18.5 MG/DL (ref 10–40)
PROT SERPL-MCNC: 6.8 G/DL (ref 5.7–8.2)
RBC # BLD AUTO: 3.94 10*6/MM3 (ref 3.89–5.14)
SODIUM BLD-SCNC: 139 MMOL/L (ref 132–146)
WBC NRBC COR # BLD: 5.61 10*3/MM3 (ref 3.5–10.8)

## 2018-09-13 PROCEDURE — 82728 ASSAY OF FERRITIN: CPT

## 2018-09-13 PROCEDURE — 85025 COMPLETE CBC W/AUTO DIFF WBC: CPT | Performed by: SURGERY

## 2018-09-13 PROCEDURE — 82746 ASSAY OF FOLIC ACID SERUM: CPT

## 2018-09-13 PROCEDURE — 83921 ORGANIC ACID SINGLE QUANT: CPT

## 2018-09-13 PROCEDURE — 36415 COLL VENOUS BLD VENIPUNCTURE: CPT | Performed by: SURGERY

## 2018-09-13 PROCEDURE — 84134 ASSAY OF PREALBUMIN: CPT

## 2018-09-13 PROCEDURE — 84425 ASSAY OF VITAMIN B-1: CPT

## 2018-09-13 PROCEDURE — 80053 COMPREHEN METABOLIC PANEL: CPT | Performed by: SURGERY

## 2018-09-15 LAB — VIT B1 BLD-SCNC: 97.6 NMOL/L (ref 66.5–200)

## 2018-09-16 LAB
Lab: NORMAL
METHYLMALONATE SERPL-SCNC: 81 NMOL/L (ref 0–378)

## 2018-09-28 ENCOUNTER — TELEPHONE (OUTPATIENT)
Dept: BARIATRICS/WEIGHT MGMT | Facility: CLINIC | Age: 48
End: 2018-09-28

## 2018-09-28 PROBLEM — K90.89 POOR IRON ABSORPTION: Status: ACTIVE | Noted: 2018-09-28

## 2018-09-28 RX ORDER — DIPHENHYDRAMINE HCL 25 MG
25 CAPSULE ORAL ONCE
Status: CANCELLED | OUTPATIENT
Start: 2018-09-28

## 2018-09-28 RX ORDER — FAMOTIDINE 10 MG/ML
20 INJECTION, SOLUTION INTRAVENOUS ONCE
Status: CANCELLED | OUTPATIENT
Start: 2018-09-28

## 2018-09-28 RX ORDER — SODIUM CHLORIDE 9 MG/ML
250 INJECTION, SOLUTION INTRAVENOUS ONCE
Status: CANCELLED | OUTPATIENT
Start: 2018-09-28

## 2018-09-28 NOTE — TELEPHONE ENCOUNTER
Pt called in ready tot schedule her iron infusion as stated on her letter.  Please place orders and I will schedule.  Please advise, thank you. Dr. Garcia pt.

## 2018-09-28 NOTE — TELEPHONE ENCOUNTER
Notified pt [facility]  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 Let pt know that once she gets her infusions she needs to f/up with her pcp in 4-6 weeks for repeat labs and continued management. Pt verbalized understanding.

## 2018-10-04 ENCOUNTER — INFUSION (OUTPATIENT)
Dept: ONCOLOGY | Facility: HOSPITAL | Age: 48
End: 2018-10-04

## 2018-10-04 VITALS
TEMPERATURE: 98 F | SYSTOLIC BLOOD PRESSURE: 113 MMHG | HEIGHT: 63 IN | RESPIRATION RATE: 18 BRPM | BODY MASS INDEX: 27.64 KG/M2 | WEIGHT: 156 LBS | HEART RATE: 61 BPM | DIASTOLIC BLOOD PRESSURE: 70 MMHG

## 2018-10-04 DIAGNOSIS — D50.9 IRON DEFICIENCY ANEMIA, UNSPECIFIED IRON DEFICIENCY ANEMIA TYPE: ICD-10-CM

## 2018-10-04 DIAGNOSIS — K90.89 POOR IRON ABSORPTION: Primary | ICD-10-CM

## 2018-10-04 PROCEDURE — 96365 THER/PROPH/DIAG IV INF INIT: CPT

## 2018-10-04 PROCEDURE — 96374 THER/PROPH/DIAG INJ IV PUSH: CPT

## 2018-10-04 PROCEDURE — 96375 TX/PRO/DX INJ NEW DRUG ADDON: CPT

## 2018-10-04 PROCEDURE — 25010000002 FERUMOXYTOL 510 MG/17ML SOLUTION 510 MG VIAL: Performed by: PHYSICIAN ASSISTANT

## 2018-10-04 PROCEDURE — 63710000001 DIPHENHYDRAMINE PER 50 MG: Performed by: PHYSICIAN ASSISTANT

## 2018-10-04 RX ORDER — DIPHENHYDRAMINE HCL 25 MG
25 CAPSULE ORAL ONCE
Status: COMPLETED | OUTPATIENT
Start: 2018-10-04 | End: 2018-10-04

## 2018-10-04 RX ORDER — FAMOTIDINE 10 MG/ML
20 INJECTION, SOLUTION INTRAVENOUS ONCE
Status: CANCELLED | OUTPATIENT
Start: 2018-10-04

## 2018-10-04 RX ORDER — SODIUM CHLORIDE 9 MG/ML
250 INJECTION, SOLUTION INTRAVENOUS ONCE
Status: COMPLETED | OUTPATIENT
Start: 2018-10-04 | End: 2018-10-04

## 2018-10-04 RX ORDER — SODIUM CHLORIDE 9 MG/ML
250 INJECTION, SOLUTION INTRAVENOUS ONCE
Status: CANCELLED | OUTPATIENT
Start: 2018-10-04

## 2018-10-04 RX ORDER — DIPHENHYDRAMINE HCL 25 MG
25 CAPSULE ORAL ONCE
Status: CANCELLED | OUTPATIENT
Start: 2018-10-04

## 2018-10-04 RX ORDER — FAMOTIDINE 10 MG/ML
20 INJECTION, SOLUTION INTRAVENOUS ONCE
Status: COMPLETED | OUTPATIENT
Start: 2018-10-04 | End: 2018-10-04

## 2018-10-04 RX ADMIN — FAMOTIDINE 20 MG: 10 INJECTION, SOLUTION INTRAVENOUS at 13:35

## 2018-10-04 RX ADMIN — FERUMOXYTOL 510 MG: 510 INJECTION INTRAVENOUS at 13:51

## 2018-10-04 RX ADMIN — DIPHENHYDRAMINE HYDROCHLORIDE 25 MG: 25 CAPSULE ORAL at 13:35

## 2018-10-04 RX ADMIN — SODIUM CHLORIDE 250 ML: 9 INJECTION, SOLUTION INTRAVENOUS at 13:35

## 2018-10-11 ENCOUNTER — INFUSION (OUTPATIENT)
Dept: ONCOLOGY | Facility: HOSPITAL | Age: 48
End: 2018-10-11

## 2018-10-11 VITALS
SYSTOLIC BLOOD PRESSURE: 107 MMHG | WEIGHT: 158 LBS | HEART RATE: 69 BPM | TEMPERATURE: 98.2 F | RESPIRATION RATE: 16 BRPM | BODY MASS INDEX: 28 KG/M2 | DIASTOLIC BLOOD PRESSURE: 66 MMHG | HEIGHT: 63 IN

## 2018-10-11 DIAGNOSIS — K90.89 POOR IRON ABSORPTION: Primary | ICD-10-CM

## 2018-10-11 DIAGNOSIS — D50.9 IRON DEFICIENCY ANEMIA, UNSPECIFIED IRON DEFICIENCY ANEMIA TYPE: ICD-10-CM

## 2018-10-11 PROCEDURE — 25010000002 FERUMOXYTOL 510 MG/17ML SOLUTION 510 MG VIAL: Performed by: PHYSICIAN ASSISTANT

## 2018-10-11 PROCEDURE — 96375 TX/PRO/DX INJ NEW DRUG ADDON: CPT

## 2018-10-11 PROCEDURE — 63710000001 DIPHENHYDRAMINE PER 50 MG: Performed by: PHYSICIAN ASSISTANT

## 2018-10-11 PROCEDURE — 96374 THER/PROPH/DIAG INJ IV PUSH: CPT

## 2018-10-11 RX ORDER — DIPHENHYDRAMINE HCL 25 MG
25 CAPSULE ORAL ONCE
Status: CANCELLED | OUTPATIENT
Start: 2018-10-11

## 2018-10-11 RX ORDER — FAMOTIDINE 10 MG/ML
20 INJECTION, SOLUTION INTRAVENOUS ONCE
Status: COMPLETED | OUTPATIENT
Start: 2018-10-11 | End: 2018-10-11

## 2018-10-11 RX ORDER — SODIUM CHLORIDE 9 MG/ML
250 INJECTION, SOLUTION INTRAVENOUS ONCE
Status: CANCELLED | OUTPATIENT
Start: 2018-10-11

## 2018-10-11 RX ORDER — SODIUM CHLORIDE 9 MG/ML
250 INJECTION, SOLUTION INTRAVENOUS ONCE
Status: COMPLETED | OUTPATIENT
Start: 2018-10-11 | End: 2018-10-11

## 2018-10-11 RX ORDER — FAMOTIDINE 10 MG/ML
20 INJECTION, SOLUTION INTRAVENOUS ONCE
Status: CANCELLED | OUTPATIENT
Start: 2018-10-11

## 2018-10-11 RX ORDER — DIPHENHYDRAMINE HCL 25 MG
25 CAPSULE ORAL ONCE
Status: COMPLETED | OUTPATIENT
Start: 2018-10-11 | End: 2018-10-11

## 2018-10-11 RX ADMIN — DIPHENHYDRAMINE HYDROCHLORIDE 25 MG: 25 CAPSULE ORAL at 13:54

## 2018-10-11 RX ADMIN — FERUMOXYTOL 510 MG: 510 INJECTION INTRAVENOUS at 14:02

## 2018-10-11 RX ADMIN — FAMOTIDINE 20 MG: 10 INJECTION, SOLUTION INTRAVENOUS at 13:54

## 2018-10-11 RX ADMIN — SODIUM CHLORIDE 250 ML: 9 INJECTION, SOLUTION INTRAVENOUS at 13:54

## 2018-10-15 ENCOUNTER — OFFICE VISIT (OUTPATIENT)
Dept: ORTHOPEDIC SURGERY | Facility: CLINIC | Age: 48
End: 2018-10-15

## 2018-10-15 VITALS — HEIGHT: 63 IN | OXYGEN SATURATION: 100 % | WEIGHT: 158.73 LBS | BODY MASS INDEX: 28.12 KG/M2 | HEART RATE: 67 BPM

## 2018-10-15 DIAGNOSIS — S83.271A COMPLEX TEAR OF LATERAL MENISCUS OF RIGHT KNEE, UNSPECIFIED WHETHER OLD OR CURRENT TEAR, INITIAL ENCOUNTER: Primary | ICD-10-CM

## 2018-10-15 DIAGNOSIS — M17.11 OSTEOARTHRITIS OF RIGHT KNEE, UNSPECIFIED OSTEOARTHRITIS TYPE: ICD-10-CM

## 2018-10-15 DIAGNOSIS — S83.231A COMPLEX TEAR OF MEDIAL MENISCUS OF RIGHT KNEE, UNSPECIFIED WHETHER OLD OR CURRENT TEAR, INITIAL ENCOUNTER: ICD-10-CM

## 2018-10-15 PROCEDURE — 99203 OFFICE O/P NEW LOW 30 MIN: CPT | Performed by: ORTHOPAEDIC SURGERY

## 2018-10-15 RX ORDER — IBUPROFEN 400 MG/1
400 TABLET ORAL AS NEEDED
COMMUNITY
End: 2020-01-27

## 2018-10-15 NOTE — PROGRESS NOTES
[facility] [place] [facility] Note    Subjective     Chief Complaint   Patient presents with   • Right Knee - Pain        HPI    Laure Valentin is a 47 y.o. female.  She presents today for evaluation of right knee pain.  The pain has been persistent since July 2018, following no exact injury, but she noted it after exercising and walking.  She has been seen by Dr. Dickens, and subsequently referred here for care and treatment.  MRI was obtained of her knee, which showed possible meniscal tearing of the posterior horns medially and laterally, as well as a Baker's cyst.  Plain films showed some mild degeneration in the knee.  She tried physical therapy and oral steroids without long-term benefit, as well as anti-inflammatories.  No improvement with the brace.  The pain is currently 3 out of 10.  It is associated with swelling and popping.  She was referred here for consideration of knee arthroscopy.      Patient Active Problem List   Diagnosis   • Heart murmur   • Iron deficiency anemia   • Annual GYN exam w/o problems   • Dysuria   • Vitamin D insufficiency   • Hot flashes   • Knee pain, bilateral   • History of bariatric surgery   • Nonintractable episodic headache   • Microscopic hematuria   • Poor iron absorption     Past Medical History:   Diagnosis Date   • GERD (gastroesophageal reflux disease)     resolved w/ WLS   • History of kidney stones 2011    most recent 12/24/17   • Kidney stone    • Microcytic anemia    • UTI (urinary tract infection)       Past Surgical History:   Procedure Laterality Date   • CERVICAL CERCLAGE  2001   • COLONOSCOPY  2011   • D&C WITH SUCTION  1997   • GASTRIC SLEEVE LAPAROSCOPIC N/A 1/26/2017    Procedure: GASTRIC SLEEVE LAPAROSCOPY   • LAPAROSCOPIC TUBAL LIGATION  2002   • [place] LAP,ESOPHAGOGAST FUNDOPLASTY N/A 1/26/2017    Procedure: HIATAL HERNIA REPAIR LAPAROSCOPIC;  Surgeon: Eulalio Dick MD   • WISDOM TOOTH EXTRACTION  1992      Family History   Problem Relation Age  of Onset   • Hypertension Mother    • Hyperlipidemia Mother    • Osteoarthritis Mother    • No Known Problems Father    • Breast cancer Neg Hx    • Ovarian cancer Neg Hx      Social History     Social History   • Marital status:      Spouse name: N/A   • Number of children: N/A   • Years of education: N/A     Occupational History   • Not on file.     Social History Main Topics   • Smoking status: Never Smoker   • Smokeless tobacco: Never Used   • Alcohol use No   • Drug use: No   • Sexual activity: Defer     Other Topics Concern   • Not on file     Social History Narrative   • No narrative on file      Current Outpatient Prescriptions on File Prior to Visit   Medication Sig Dispense Refill   • ferrous sulfate 325 (65 FE) MG tablet Take 1 tablet by mouth 3 (Three) Times a Day. 90 tablet 0   • Multiple Vitamins-Minerals (MULTIVITAMIN ADULT PO) Take 1 tablet by mouth Daily.       No current facility-administered medications on file prior to visit.       No Known Allergies     Review of Systems   Constitutional: Negative for activity change, appetite change, chills, diaphoresis, fatigue, fever and unexpected weight change.   HENT: Negative for congestion, dental problem, drooling, ear discharge, ear pain, facial swelling, hearing loss, mouth sores, nosebleeds, postnasal drip, rhinorrhea, sinus pressure, sneezing, sore throat, tinnitus, trouble swallowing and voice change.    Eyes: Negative for photophobia, pain, discharge, redness, itching and visual disturbance.   Respiratory: Negative for apnea, cough, choking, chest tightness, shortness of breath, wheezing and stridor.    Cardiovascular: Negative for chest pain, palpitations and leg swelling.   Gastrointestinal: Negative for abdominal distention, abdominal pain, anal bleeding, blood in stool, constipation, diarrhea, nausea, rectal pain and vomiting.   Endocrine: Negative for cold intolerance, heat intolerance, polydipsia, polyphagia and polyuria.  "  Genitourinary: Negative for decreased urine volume, difficulty urinating, dysuria, enuresis, flank pain, frequency, genital sores, hematuria and urgency.   Musculoskeletal: Positive for arthralgias. Negative for back pain, gait problem, joint swelling, myalgias, neck pain and neck stiffness.   Skin: Negative for color change, pallor, rash and wound.   Allergic/Immunologic: Negative for environmental allergies, food allergies and immunocompromised state.   Neurological: Negative for dizziness, tremors, seizures, syncope, facial asymmetry, speech difficulty, weakness, light-headedness, numbness and headaches.   Hematological: Negative for adenopathy. Does not bruise/bleed easily.   Psychiatric/Behavioral: Negative for agitation, behavioral problems, confusion, decreased concentration, dysphoric mood, hallucinations, self-injury, sleep disturbance and suicidal ideas. The patient is not nervous/anxious and is not hyperactive.         Objective      Physical Exam  Pulse 67   Ht 160 cm (62.99\")   Wt 72 kg (158 lb 11.7 oz)   SpO2 100%   BMI 28.13 kg/m²     Body mass index is 28.13 kg/m².    General:   Mental Status:  Alert   Appearance: Cooperative, in no acute distress   Build and Nutrition: Well-nourished and well developed female   Orientation: Alert and oriented to person, place and time   Posture: Normal   Gait: Normal    Integument:   Right knee: No skin lesions, no rash, no ecchymosis    Neurologic:   Sensation:    Right foot: Intact to light touch on the dorsal and plantar aspect   Motor:  Right lower extremity: 5/5 quadriceps, hamstrings, ankle dorsiflexors, and ankle plantar flexors    Vascular:   Right lower extremity: 2+ dorsalis pedis pulse, prompt capillary refill    Lower Extremities:   Right Knee:    Tenderness:  Medial and lateral joint line tenderness    Effusion:  None    Swelling:  None    Crepitus:  Positive    Atrophy:  None    Range of motion:  Extension: 0°       Flexion: 130°  Instability: "  No varus laxity, no valgus laxity, negative anterior drawer  Deformities:  None  Functional testing: Negative Yelena's        Imaging/Studies  X-ray right knee: Outside images from 8/1/2018: Patellofemoral spurring medially, with mild medial spurring in the medial compartment, with mild joint space narrowing.    EXAMINATION: MRI KNEE, RIGHT, WO CONTRAST-08/31/2018:     INDICATION: KNEE PAIN; M25.461-Effusion, right knee.      TECHNIQUE: Multiplanar MRI of the knee without intravenous contrast.     COMPARISON: NONE.     FINDINGS: Extensor mechanism intact with quadriceps and patellar tendons  in normal limits. Small-to-moderate joint effusion with suprapatellar  involvement. Axial dataset evaluation of the patellofemoral compartment  demonstrating mild osteophytosis and degeneration, however, no  high-grade chondromalacia or obvious fracture/bone edema. Osseous  structures are grossly unremarkable without evidence for fracture or  microfracture, no significant edema pattern within the osseous  structures.     ACL and PCL intact. MCL and LCL intact.     Menisci: Linear increased signal within the posterior horns bilaterally  longitudinal in orientation consistent with longitudinal tearing of the  bilateral menisci posterior horns.      Soft tissues demonstrate multiseptated fluid signal extending from  adjacent to the medial head gastrocnemius along the medial margin of the  joint space and above the joint space adjacent to the medial femoral  condyle with adjacent inflammatory stranding and edema consistent of  popliteal cyst formation.     IMPRESSION:  1. Longitudinal tearing of the posterior horns medial and lateral  menisci.  2. Large multiseptated popliteal/Baker's cyst formation extending from  the expected location adjacent to the medial head of the gastrocnemius  along the medial joint space line and to adjacent the medial femoral  condyle with surrounding inflammatory stranding and edema.  3. Moderate  joint effusion.     D:  08/31/2018  E:  08/31/2018            This report was finalized on 8/31/2018 1:11 PM by Dr. Jt Manrique.    Assessment and Plan     Laure was seen today for pain.    Diagnoses and all orders for this visit:    Complex tear of lateral meniscus of right knee, unspecified whether old or current tear, initial encounter  -     External Facility Surgical/Procedural Request; Future    Complex tear of medial meniscus of right knee, unspecified whether old or current tear, initial encounter  -     External Facility Surgical/Procedural Request; Future    Osteoarthritis of right knee, unspecified osteoarthritis type        I reviewed my findings with patient today.  She does have some underlying mild degeneration the knee, and her MRI suggests posterior horn meniscal tears of both medial and lateral aspects, as well as a Bakers cyst.  We discussed treatment options today, and she would like to consider knee arthroscopy.  We discussed the risks, benefits, and alternatives.  We'll plan for a right knee arthroscopy at a mutually convenient time, specifically to address the meniscal pathology.  I discussed that the Bakers cyst may respond to treatment of the meniscal pathology, and rarely requires direct treatment.  Please see my counseling note for details.  She would like to schedule surgery for December timeframe.      Surgical Counseling     After examining the patient and reviewing the diagnostic studies, I discussed the non-operative and surgical options for their knee problem. The patient wishes to proceed with operative intervention, knowing the risks, benefits and alternatives to the surgical procedure.  Risks were discussed, which included, but are not limited to: bleeding, infection, damage to blood vessels and nerves, no improvement in symptoms, worsening symptoms, incomplete pain relief, need for further surgery, deep venous thrombosis, pulmonary embolus, death and anesthetic complications.   The patient understands, consents, and their questions were answered.  The typical rehabilitative course was also discussed.  We will schedule surgery at a mutually convenient time in the near future.      Return for For surgery as planned.      Medical Decision Making  Management Options : major surgery with risk factors  Data/Risk: radiology tests and independent visualization of imaging, lab tests, or EMG/NCV      El Gill MD  10/15/18  5:41 PM

## 2018-10-30 ENCOUNTER — HOSPITAL ENCOUNTER (OUTPATIENT)
Dept: MAMMOGRAPHY | Facility: HOSPITAL | Age: 48
Discharge: HOME | End: 2018-10-30
Attending: INTERNAL MEDICINE | Admitting: PHYSICIAN ASSISTANT

## 2018-10-30 DIAGNOSIS — R92.8 ABNORMAL MAMMOGRAM: ICD-10-CM

## 2018-10-30 PROCEDURE — G0279 TOMOSYNTHESIS, MAMMO: HCPCS

## 2018-10-30 PROCEDURE — 77062 BREAST TOMOSYNTHESIS BI: CPT | Performed by: RADIOLOGY

## 2018-10-30 PROCEDURE — 77066 DX MAMMO INCL CAD BI: CPT

## 2018-10-30 PROCEDURE — 77066 DX MAMMO INCL CAD BI: CPT | Performed by: RADIOLOGY

## 2018-12-14 ENCOUNTER — OUTSIDE FACILITY SERVICE (OUTPATIENT)
Dept: ORTHOPEDIC SURGERY | Facility: CLINIC | Age: 48
End: 2018-12-14

## 2018-12-14 PROCEDURE — 29881 ARTHRS KNE SRG MNISECTMY M/L: CPT | Performed by: ORTHOPAEDIC SURGERY

## 2018-12-14 RX ORDER — HYDROCODONE BITARTRATE AND ACETAMINOPHEN 5; 325 MG/1; MG/1
1 TABLET ORAL EVERY 6 HOURS PRN
Qty: 15 TABLET | Refills: 0 | Status: SHIPPED | OUTPATIENT
Start: 2018-12-14 | End: 2018-12-26

## 2018-12-26 ENCOUNTER — OFFICE VISIT (OUTPATIENT)
Dept: ORTHOPEDIC SURGERY | Facility: CLINIC | Age: 48
End: 2018-12-26

## 2018-12-26 DIAGNOSIS — Z47.89 ORTHOPEDIC AFTERCARE: Primary | ICD-10-CM

## 2018-12-26 PROCEDURE — 99024 POSTOP FOLLOW-UP VISIT: CPT | Performed by: ORTHOPAEDIC SURGERY

## 2018-12-26 NOTE — PROGRESS NOTES
[facility] – [place] [facility] Note    Subjective     Chief Complaint   Patient presents with   • Post-op     2 weeks s/p (R) knee arthroscopy with partial medial menisectomy and platellar chondroplasty 12/14/2018        HPI    Laure Valentin is a 48 y.o. female.  She follows up today status post right knee arthroscopy.  No new complaints today.  Ambulating with a single crutch.      Patient Active Problem List   Diagnosis   • Heart murmur   • Iron deficiency anemia   • Annual GYN exam w/o problems   • Dysuria   • Vitamin D insufficiency   • Hot flashes   • Knee pain, bilateral   • History of bariatric surgery   • Nonintractable episodic headache   • Microscopic hematuria   • Poor iron absorption     Past Medical History:   Diagnosis Date   • GERD (gastroesophageal reflux disease)     resolved w/ WLS   • History of kidney stones 2011    most recent 12/24/17   • Kidney stone    • Microcytic anemia    • UTI (urinary tract infection)       Past Surgical History:   Procedure Laterality Date   • CERVICAL CERCLAGE  2001   • COLONOSCOPY  2011   • D&C WITH SUCTION  1997   • GASTRIC SLEEVE LAPAROSCOPIC N/A 1/26/2017    Procedure: GASTRIC SLEEVE LAPAROSCOPY   • LAPAROSCOPIC TUBAL LIGATION  2002   • [place] LAP,ESOPHAGOGAST FUNDOPLASTY N/A 1/26/2017    Procedure: HIATAL HERNIA REPAIR LAPAROSCOPIC;  Surgeon: Eulalio Dick MD   • WISDOM TOOTH EXTRACTION  1992      Family History   Problem Relation Age of Onset   • Hypertension Mother    • Hyperlipidemia Mother    • Osteoarthritis Mother    • No Known Problems Father    • Breast cancer Neg Hx    • Ovarian cancer Neg Hx      Social History     Socioeconomic History   • Marital status:      Spouse name: Not on file   • Number of children: Not on file   • Years of education: Not on file   • Highest education level: Not on file   Social Needs   • Financial resource strain: Not on file   • Food insecurity - worry: Not on file   • Food insecurity - inability: Not on file    • Transportation needs - medical: Not on file   • Transportation needs - non-medical: Not on file   Occupational History   • Not on file   Tobacco Use   • Smoking status: Never Smoker   • Smokeless tobacco: Never Used   Substance and Sexual Activity   • Alcohol use: No   • Drug use: No   • Sexual activity: Defer   Other Topics Concern   • Not on file   Social History Narrative   • Not on file      Current Outpatient Medications on File Prior to Visit   Medication Sig Dispense Refill   • ferrous sulfate 325 (65 FE) MG tablet Take 1 tablet by mouth 3 (Three) Times a Day. 90 tablet 0   • hepatitis A (HAVRIX) 1440 EL U/ML vaccine Inject 1 mL into the appropriate muscle as directed by prescriber. Repeat in 6 months 1 mL 1   • ibuprofen (ADVIL,MOTRIN) 400 MG tablet Take 400 mg by mouth As Needed for Mild Pain .     • Multiple Vitamins-Minerals (MULTIVITAMIN ADULT PO) Take 1 tablet by mouth Daily.     • [DISCONTINUED] HYDROcodone-acetaminophen (NORCO) 5-325 MG per tablet Take 1 tablet by mouth Every 6 (Six) Hours As Needed for Moderate Pain . 15 tablet 0     No current facility-administered medications on file prior to visit.       No Known Allergies     Review of Systems   Constitutional: Negative for activity change, appetite change, chills, diaphoresis, fatigue, fever and unexpected weight change.   HENT: Negative for congestion, dental problem, drooling, ear discharge, ear pain, facial swelling, hearing loss, mouth sores, nosebleeds, postnasal drip, rhinorrhea, sinus pressure, sneezing, sore throat, tinnitus, trouble swallowing and voice change.    Eyes: Negative for photophobia, pain, discharge, redness, itching and visual disturbance.   Respiratory: Negative for apnea, cough, choking, chest tightness, shortness of breath, wheezing and stridor.    Cardiovascular: Negative for chest pain, palpitations and leg swelling.   Gastrointestinal: Negative for abdominal distention, abdominal pain, anal bleeding, blood in  stool, constipation, diarrhea, nausea, rectal pain and vomiting.   Endocrine: Negative for cold intolerance, heat intolerance, polydipsia, polyphagia and polyuria.   Genitourinary: Negative for decreased urine volume, difficulty urinating, dysuria, enuresis, flank pain, frequency, genital sores, hematuria and urgency.   Musculoskeletal: Positive for arthralgias. Negative for back pain, gait problem, joint swelling, myalgias, neck pain and neck stiffness.   Skin: Negative for color change, pallor, rash and wound.   Allergic/Immunologic: Negative for environmental allergies, food allergies and immunocompromised state.   Neurological: Negative for dizziness, tremors, seizures, syncope, facial asymmetry, speech difficulty, weakness, light-headedness, numbness and headaches.   Hematological: Negative for adenopathy. Does not bruise/bleed easily.   Psychiatric/Behavioral: Negative for agitation, behavioral problems, confusion, decreased concentration, dysphoric mood, hallucinations, self-injury, sleep disturbance and suicidal ideas. The patient is not nervous/anxious and is not hyperactive.         Objective      Physical Exam  There were no vitals taken for this visit.    There is no height or weight on file to calculate BMI.    General:   Mental Status:  Alert   Appearance: Cooperative, in no acute distress   Build and Nutrition: Well-nourished and well developed female   Orientation: Alert and oriented to person, place and time   Posture: Normal   Gait: Normal    Integument:   Right knee: Portal sites are well-healed    Lower Extremities:   Right Knee:    Tenderness:  None    Effusion:  None    Swelling: None    Crepitus:  None    Range of motion:  Extension: 0°       Flexion: 125°  Instability:  No varus laxity, no valgus laxity, negative anterior drawer  Deformities:  None          Assessment and Plan     Laure was seen today for post-op.    Diagnoses and all orders for this visit:    Orthopedic aftercare  -      Ambulatory Referral to Physical Therapy Evaluate and treat; Stretching, ROM, Strengthening; Right        I reviewed my findings with patient today.  She's doing well following her right knee arthroscopy, with partial medial meniscectomy and chondroplasty patella.  At this point, we will begin some physical therapy, and a referral was provided.  I will see her back in 4 weeks, but sooner for any problems.    Return in about 4 weeks (around 1/23/2019).          El Gill MD  12/26/18  1:40 PM

## 2019-01-02 ENCOUNTER — HOSPITAL ENCOUNTER (OUTPATIENT)
Dept: PHYSICAL THERAPY | Facility: HOSPITAL | Age: 49
Setting detail: THERAPIES SERIES
Discharge: HOME | End: 2019-01-02

## 2019-01-02 DIAGNOSIS — Z47.89 ORTHOPEDIC AFTERCARE: Primary | ICD-10-CM

## 2019-01-02 DIAGNOSIS — M25.561 RIGHT KNEE PAIN, UNSPECIFIED CHRONICITY: ICD-10-CM

## 2019-01-02 PROCEDURE — 97161 PT EVAL LOW COMPLEX 20 MIN: CPT

## 2019-01-02 NOTE — THERAPY EVALUATION
Outpatient Physical Therapy Ortho Initial Evaluation  [facility]     Patient Name: Laure Valentin  : 1970  MRN: [id number]  Today's Date: 2019      Visit Date: 2019    Patient Active Problem List   Diagnosis   • Heart murmur   • Iron deficiency anemia   • Annual GYN exam w/o problems   • Dysuria   • Vitamin D insufficiency   • Hot flashes   • Knee pain, bilateral   • History of bariatric surgery   • Nonintractable episodic headache   • Microscopic hematuria   • Poor iron absorption        Past Medical History:   Diagnosis Date   • GERD (gastroesophageal reflux disease)     resolved w/ WLS   • History of kidney stones     most recent 17   • Kidney stone    • Microcytic anemia    • UTI (urinary tract infection)         Past Surgical History:   Procedure Laterality Date   • CERVICAL CERCLAGE     • COLONOSCOPY     • D&C WITH SUCTION     • GASTRIC SLEEVE LAPAROSCOPIC N/A 2017    Procedure: GASTRIC SLEEVE LAPAROSCOPY   • LAPAROSCOPIC TUBAL LIGATION     • [place] LAP,ESOPHAGOGAST FUNDOPLASTY N/A 2017    Procedure: HIATAL HERNIA REPAIR LAPAROSCOPIC;  Surgeon: Eulalio Dick MD   • WISDOM TOOTH EXTRACTION         Visit Dx:     ICD-10-CM ICD-9-CM   1. Orthopedic aftercare Z47.89 V54.9   2. Right knee pain, unspecified chronicity M25.561 719.46       Patient History     Row Name 19 1036             History    Chief Complaint  Pain  -AC      Type of Pain  Knee pain  -AC      Date Current Problem(s) Began  07/10/18  -      Brief Description of Current Complaint  Pt was previously seeing us for PT for bilateral knee pain.  Received an MRI demonstrating meniscus tear in R knee. Underwent arthroscopy and partial medial meniscectomy on 18.  Pt has been walking with a crutch for longer distances.  Is currently WBAT. Was previously having pain/popping in posterior knee, but now having pain/popping in anterior knee.  Has had swelling in whole leg but this is  improving.  -AC      Previous treatment for THIS PROBLEM  Surgery;Medication;Rehabilitation  -AC      Surgery Date:  12/14/18  -AC      Patient/Caregiver Goals  Return to prior level of function  -AC      Patient's Rating of General Health  Very good  -AC      Occupation/sports/leisure activities  Works here in employee health.  Enjoys exercising everyday for 2-3 hours 5 days a week - hasn't been able to do that since her knees have been bothering her.  -AC      Patient seeing anyone else for problem(s)?  Dr. Gill  -AC      How has patient tried to help current problem?  Ibuprofen, ice  -AC      What clinical tests have you had for this problem?  MRI  -AC      Results of Clinical Tests  Meniscal tear posterior horns of R knee; Baker's cyst  -AC      Related/Recent Hospitalizations  Yes  -AC      Date of Hospitalization  12/14/18  -AC         Pain     Pain Location  Knee  -AC      Pain at Present  3  -AC      Pain at Best  3  -AC      Pain at Worst  7  -AC      Pain Frequency  Intermittent  -AC      Pain Description  Throbbing Throbbing at rest  -AC      What Performance Factors Make the Current Problem(s) WORSE?  Squatting, walking > 10 mins  -AC      What Performance Factors Make the Current Problem(s) BETTER?  Ice  -AC      Is your sleep disturbed?  No  -AC      Difficulties at work?  Just returned to work today  -AC      Difficulties with ADL's?  Showering/dressing,   -AC      Difficulties with recreational activities?  Wants to return to the gym  -AC         Fall Risk Assessment    Any falls in the past year:  No  -AC         Daily Activities    Primary Language  English  -      How does patient learn best?  Listening;Reading;Demonstration  -AC      Barriers to learning  None  -AC      Pt Participated in POC and Goals  Yes  -AC         Safety    Are you being hurt, hit, or frightened by anyone at home or in your life?  No  -AC      Are you being neglected by a caregiver  No  -AC        User Key  (r) = Recorded  By, (t) = Taken By, (c) = Cosigned By    Initials Name Provider Type    AC Toshia Segura, LARS Physical Therapist        PT Ortho     Row Name 01/02/19 1036       Posture/Observations    Posture/Observations Comments  Three small incisions surorunding R knee.  Fully closed with no erythema/rubor noted.  TTP with patellar glides.  -AC       Quarter Clearing    Quarter Clearing  Lower Quarter Clearing  -AC       DTR- Lower Quarter Clearing    Patellar tendon (L2-4)  Right:;1- Minimal response;Left:;2- Normal response  -AC    Achilles tendon (S1-2)  Bilateral:;1- Minimal response  -AC       Myotomal Screen- Lower Quarter Clearing    Hip flexion (L2)  Right:;4 (Good);Left:;5 (Normal)  -AC    Knee extension (L3)  -- NT  -AC    Ankle DF (L4)  Bilateral:;5 (Normal)  -AC    Great toe extension (L5)  Bilateral:;4+ (Good +)  -AC    Knee flexion (S2)  -- NT  -AC       Patellar Accessory Motions    Patellar Accessory Motions Tested?  Yes Hypermobile in all directions (L>R)  -AC       General ROM    RT Lower Ext  Rt Knee Extension/Flexion  -AC    LT Lower Ext  Lt Knee Extension/Flexion  -AC       Right Lower Ext    Rt Knee Extension/Flexion AROM    -AC       Left Lower Ext    Lt Knee Extension/Flexion AROM  0-130  -AC       MMT (Manual Muscle Testing)    Rt Lower Ext  Rt Hip ABduction;Rt Hip Extension;Rt Ankle Subtalar Inversion;Rt Ankle Subtalar Eversion  -AC    Lt Lower Ext  Lt Hip ABduction;Lt Hip Extension;Lt Ankle Subtalar Inversion;Lt Ankle Subtalar Eversion  -AC       MMT Right Lower Ext    Rt Hip Extension MMT, Gross Movement  (4-/5) good minus Pain in patella  -AC    Rt Hip ABduction MMT, Gross Movement  (4-/5) good minus Pain in patella  -AC    Rt Ankle Subtalar Inversion MT, Gross Movement  (5/5) normal  -AC    Rt Ankle Subtalar Eversion MMT, Gross Movement  (5/5) normal  -AC       MMT Left Lower Ext    Lt Hip Extension MMT, Gross Movement  (4+/5) good plus  -AC    Lt Hip ABduction MMT, Gross Movement  (5/5)  normal  -AC    Lt Ankle Subtalar Inversion MMT, Gross Movement  (5/5) normal  -AC    Lt Ankle Subtalar Eversion MMT, Gross Movement  (5/5) normal  -AC       Sensation    Sensation WNL?  WNL  -AC       RLE Quick Girth (cm)    Tib tuberosity  35 cm  -AC    Mid patella  41 cm  -AC       LLE Quick Girth (cm)    Tib tuberosity  36.6 cm  -AC    Mid patella  42.7 cm  -AC      User Key  (r) = Recorded By, (t) = Taken By, (c) = Cosigned By    Initials Name Provider Type    AC Toshia Segura, PT Physical Therapist        Therapy Education  Education Details: HEP provided including: SLR, hip abduction, knee flexion in prone and standing, and PROM knee flexion with towel.  Given: HEP  Program: New  How Provided: Verbal, Demonstration, Written  Provided to: Patient  Level of Understanding: Verbalized     PT OP Goals     Row Name 01/02/19 1036          PT Short Term Goals    STG Date to Achieve  01/23/19  -AC     STG 1  Decrease R knee pain frequency/intensity by > or = 50%.  -AC     STG 1 Progress  New  -AC     STG 2  Perform independent HEP to improve mobility of R knee and strength.  -AC     STG 2 Progress  New  -AC     STG 3  Improve R knee flexion ROM to 120 degrees, and extension to neutral.  -AC     STG 3 Progress  New  -AC     STG 4  Enable ability to walk without requiring use of crutch.  -AC     STG 4 Progress  New  -AC        Long Term Goals    LTG Date to Achieve  02/13/19  -AC     LTG 1  Decrease R knee pain frequency/intensity by > or = 75%.  -AC     LTG 1 Progress  New  -AC     LTG 2  Improve LEFS score to > or = 50/80 to reflect significant improvement in use of RLE for ADLs/functional mobility.  -AC     LTG 2 Progress  New  -AC     LTG 3  Enable pain-free squatting.  -     LTG 3 Progress  New  -AC        Time Calculation    PT Goal Re-Cert Due Date  04/02/19  -       User Key  (r) = Recorded By, (t) = Taken By, (c) = Cosigned By    Initials Name Provider Type    Toshia Guadalupe, PT Physical  Therapist        PT Assessment/Plan     Row Name 01/02/19 1036          PT Assessment    Functional Limitations  Impaired gait;Performance in leisure activities;Performance in self-care ADL;Limitation in home management;Limitations in community activities  -     Impairments  Gait;Muscle strength;Pain;Edema;Locomotion;Range of motion  -     Assessment Comments  Pt presents with signs and symptoms consistent with diagnosis, with associated post-op limitations in strength, ROM, and pain.  PT intervention is warranted to improve ROM and strength to allow for return to PLOF.  Pt demonstrates very mild edema and mild ROM deficits, but is able to walk without an AD with the exception of > 10 mins.  -AC     Please refer to paper survey for additional self-reported information  Yes  -AC     Rehab Potential  Good  -AC     Patient/caregiver participated in establishment of treatment plan and goals  Yes  -AC     Patient would benefit from skilled therapy intervention  Yes  -AC        PT Plan    PT Frequency  2x/week  -AC     Predicted Duration of Therapy Intervention (Therapy Eval)  12 visits  -AC     Planned CPT's?  PT EVAL LOW COMPLEXITY: 41748;PT RE-EVAL: 88485;PT MANUAL THERAPY EA 15 MIN: 96429;PT ULTRASOUND EA 15 MIN: 75261;PT HOT/COLD PACK WC NONMCARE: 70277;PT THER PROC EA 15 MIN: 42753;PT NEUROMUSC RE-EDUCATION EA 15 MIN: 36394;PT ELECTRICAL STIM UNATTEND: ;PT SELF CARE/MGMT/TRAIN 15 MIN: 74955;PT THER MASS EA 15 MIN: 79543;PT THER SUPP EA 15 MIN;PT THER ACT EA 15 MIN: 72888;PT GAIT TRAINING EA 15 MIN: 08994  -AC     PT Plan Comments  Progress ROM/open chain exercises as tolerated, incorporating weight-bearing activities over time. Manual therapy and modalities as needed.  -AC       User Key  (r) = Recorded By, (t) = Taken By, (c) = Cosigned By    Initials Name Provider Type    AC Toshia Segura, PT Physical Therapist        Outcome Measure Options: Lower Extremity Functional Scale (LEFS)  Lower Extremity  Functional Index  Any of your usual work, housework or school activities: Moderate difficulty  Your usual hobbies, recreational or sporting activities: Extreme difficulty or unable to perform activity  Getting into or out of the bath: Moderate difficulty  Walking between rooms: Moderate difficulty  Putting on your shoes or socks: No difficulty  Squatting: Quite a bit of difficulty  Lifting an object, like a bag of groceries from the floor: No difficulty  Performing light activities around your home: Moderate difficulty  Performing heavy activities around your home: Quite a bit of difficulty  Getting into or out of a car: A little bit of difficulty  Walking 2 blocks: Quite a bit of difficulty  Walking a mile: Extreme difficulty or unable to perform activity  Going up or down 10 stairs (about 1 flight of stairs): Quite a bit of difficulty  Standing for 1 hour: Quite a bit of difficulty  Sitting for 1 hour: No difficulty  Running on even ground: Extreme difficulty or unable to perform activity  Running on uneven ground: Extreme difficulty or unable to perform activity  Making sharp turns while running fast: Extreme difficulty or unable to perform activity  Hopping: Extreme difficulty or unable to perform activity  Rolling over in bed: No difficulty  Total: 32    Time Calculation:     Therapy Suggested Charges     Code   Minutes Charges    None           Start Time: 1036     Therapy Charges for Today     Code Description Service Date Service Provider Modifiers Qty    20725670320 HC PT EVAL LOW COMPLEXITY 4 1/2/2019 Toshia Segura, PT GP 1        PT G-Jeannette  Outcome Measure Options: Lower Extremity Functional Scale (LEFS)  Total: 32     Toshia Segura, PT  1/2/2019

## 2019-01-07 ENCOUNTER — HOSPITAL ENCOUNTER (OUTPATIENT)
Dept: PHYSICAL THERAPY | Facility: HOSPITAL | Age: 49
Setting detail: THERAPIES SERIES
Discharge: HOME | End: 2019-01-07

## 2019-01-07 DIAGNOSIS — M25.561 RIGHT KNEE PAIN, UNSPECIFIED CHRONICITY: ICD-10-CM

## 2019-01-07 DIAGNOSIS — Z47.89 ORTHOPEDIC AFTERCARE: Primary | ICD-10-CM

## 2019-01-07 PROCEDURE — 97010 HOT OR COLD PACKS THERAPY: CPT

## 2019-01-07 PROCEDURE — 97110 THERAPEUTIC EXERCISES: CPT

## 2019-01-07 NOTE — THERAPY TREATMENT NOTE
Outpatient Physical Therapy Ortho Treatment Note  [facility]     Patient Name: Laure Valentin  : 1970  MRN: [id number]  Today's Date: 2019      Visit Date: 2019    Visit Dx:    ICD-10-CM ICD-9-CM   1. Orthopedic aftercare Z47.89 V54.9   2. Right knee pain, unspecified chronicity M25.561 719.46       Patient Active Problem List   Diagnosis   • Heart murmur   • Iron deficiency anemia   • Annual GYN exam w/o problems   • Dysuria   • Vitamin D insufficiency   • Hot flashes   • Knee pain, bilateral   • History of bariatric surgery   • Nonintractable episodic headache   • Microscopic hematuria   • Poor iron absorption        Past Medical History:   Diagnosis Date   • GERD (gastroesophageal reflux disease)     resolved w/ WLS   • History of kidney stones     most recent 17   • Kidney stone    • Microcytic anemia    • UTI (urinary tract infection)         Past Surgical History:   Procedure Laterality Date   • CERVICAL CERCLAGE     • COLONOSCOPY     • D&C WITH SUCTION     • GASTRIC SLEEVE LAPAROSCOPIC N/A 2017    Procedure: GASTRIC SLEEVE LAPAROSCOPY   • LAPAROSCOPIC TUBAL LIGATION     • [place] LAP,ESOPHAGOGAST FUNDOPLASTY N/A 2017    Procedure: HIATAL HERNIA REPAIR LAPAROSCOPIC;  Surgeon: Eulalio Dick MD   • WISDOM TOOTH EXTRACTION       PT Assessment/Plan     Row Name 19 0804          PT Assessment    Assessment Comments  Pt tolerated introduction to therex well, including standing activities to encourage extension ROM and quadriceps strength.  Pt only had mild c/o pain on [employer] with partial-range flexion, and also some c/o tightness sensation with knee flexion. Pt provided red TB to perform TKEs at home.    -AC        PT Plan    PT Plan Comments  Progress exercises with care, with manual therapy and modalities as tolerated.  -AC       User Key  (r) = Recorded By, (t) = Taken By, (c) = Cosigned By    Initials Name Provider Type    EDGAR Segura  "Toshia LAY PT Physical Therapist        Modalities     Row Name 01/07/19 0804             Subjective Pain    Post-Treatment Pain Level  0  -AC         Ice    Ice Applied  Yes  -AC      Location  R knee  -AC      Rx Minutes  10 mins  -AC      Ice S/P Rx  Yes  -AC        User Key  (r) = Recorded By, (t) = Taken By, (c) = Cosigned By    Initials Name Provider Type    Toshia Guadalupe, PT Physical Therapist        Exercises     Row Name 01/07/19 0804             Subjective Comments    Subjective Comments  Pt has no new complaints today, did okay with home program.  -AC         Subjective Pain    Able to rate subjective pain?  yes  -AC      Pre-Treatment Pain Level  0 \"Just usual soreness\"  -AC      Post-Treatment Pain Level  0  -AC         Total Minutes    96434 - PT Therapeutic Exercise Minutes  30  -AC         Exercise 1    Exercise Name 1  Exercises performed in clinical setting as per flow sheet.  -AC      Cueing 1  Verbal;Tactile;Demo  -AC      Time 1  30  -AC      Additional Comments  Ther Ex  -AC        User Key  (r) = Recorded By, (t) = Taken By, (c) = Cosigned By    Initials Name Provider Type    Toshia Guadalupe, PT Physical Therapist        Time Calculation:   Start Time: 0804  Therapy Suggested Charges     Code   Minutes Charges    36534 (CPT®) Hc Pt Neuromusc Re Education Ea 15 Min      08058 (CPT®) Hc Pt Ther Proc Ea 15 Min 30 2    62534 (CPT®) Hc Gait Training Ea 15 Min      32714 (CPT®) Hc Pt Therapeutic Act Ea 15 Min      24047 (CPT®) Hc Pt Manual Therapy Ea 15 Min      77443 (CPT®) Hc Pt Ther Massage- Per 15 Min      46160 (CPT®) Hc Pt Iontophoresis Ea 15 Min      92423 (CPT®) Hc Pt Elec Stim Ea-Per 15 Min      97314 (CPT®) Hc Pt Ultrasound Ea 15 Min      88558 (CPT®) Hc Pt Self Care/Mgmt/Train Ea 15 Min      56338 (CPT®) Hc Pt Prosthetic (S) Train Initial Encounter, Each 15 Min      62637 (CPT®) Hc Orthotic(S) Mgmt/Train Initial Encounter, Each 15min      61480 (CPT®) Hc Pt Aquatic Therapy " Ea 15 Min      76039 (CPT®) Hc Pt Orthotic(S)/Prosthetic(S) Encounter, Each 15 Min       (CPT®) Hc Pt Electrical Stim Unattended      Total  30 2        Therapy Charges for Today     Code Description Service Date Service Provider Modifiers Qty    53113246543 HC PT THER PROC EA 15 MIN 1/7/2019 Toshia Segura, PT GP 2    32712579614 HC PT HOT/COLD PACK WC NONMCARE 1/7/2019 Toshia Segura, PT GP 1        Toshia Segura, PT  1/7/2019

## 2019-01-09 ENCOUNTER — HOSPITAL ENCOUNTER (OUTPATIENT)
Dept: PHYSICAL THERAPY | Facility: HOSPITAL | Age: 49
Setting detail: THERAPIES SERIES
Discharge: HOME | End: 2019-01-09

## 2019-01-09 DIAGNOSIS — Z47.89 ORTHOPEDIC AFTERCARE: Primary | ICD-10-CM

## 2019-01-09 PROCEDURE — 97110 THERAPEUTIC EXERCISES: CPT | Performed by: PHYSICAL THERAPIST

## 2019-01-09 PROCEDURE — 97010 HOT OR COLD PACKS THERAPY: CPT | Performed by: PHYSICAL THERAPIST

## 2019-01-09 NOTE — THERAPY TREATMENT NOTE
Outpatient Physical Therapy Ortho Treatment Note  [facility]     Patient Name: Laure Valentin  : 1970  MRN: [id number]  Today's Date: 2019      Visit Date: 2019    Visit Dx:    ICD-10-CM ICD-9-CM   1. Orthopedic aftercare Z47.89 V54.9       Patient Active Problem List   Diagnosis   • Heart murmur   • Iron deficiency anemia   • Annual GYN exam w/o problems   • Dysuria   • Vitamin D insufficiency   • Hot flashes   • Knee pain, bilateral   • History of bariatric surgery   • Nonintractable episodic headache   • Microscopic hematuria   • Poor iron absorption        Past Medical History:   Diagnosis Date   • GERD (gastroesophageal reflux disease)     resolved w/ WLS   • History of kidney stones     most recent 17   • Kidney stone    • Microcytic anemia    • UTI (urinary tract infection)         Past Surgical History:   Procedure Laterality Date   • CERVICAL CERCLAGE     • COLONOSCOPY     • D&C WITH SUCTION     • GASTRIC SLEEVE LAPAROSCOPIC N/A 2017    Procedure: GASTRIC SLEEVE LAPAROSCOPY   • LAPAROSCOPIC TUBAL LIGATION     • [place] LAP,ESOPHAGOGAST FUNDOPLASTY N/A 2017    Procedure: HIATAL HERNIA REPAIR LAPAROSCOPIC;  Surgeon: Eulalio Dick MD   • WISDOM TOOTH EXTRACTION                         PT Assessment/Plan     Row Name 19 1147          PT Assessment    Assessment Comments  Tolerated continued care very well.  Prone lying for TKE stretching introduced as HEP adjunct today.  ROM and gait mechanics progressing as expected.  Patient will benefit from ongoing care to improve function and return to all tasks.   -CR        PT Plan    PT Plan Comments  cont with POC for ongoing recovery from menisectomy   -CR       User Key  (r) = Recorded By, (t) = Taken By, (c) = Cosigned By    Initials Name Provider Type    Tiburcio Holt, PT Physical Therapist          Modalities     Row Name 19 1100             Subjective Pain    Post-Treatment  Pain Level  0  -CR         Ice    Ice Applied  Yes  -CR      Location  R knee  -CR      Rx Minutes  10 mins  -CR      Ice S/P Rx  Yes  -CR        User Key  (r) = Recorded By, (t) = Taken By, (c) = Cosigned By    Initials Name Provider Type    Tiburcio Holt PT Physical Therapist          Exercises     Row Name 01/09/19 1100             Subjective Comments    Subjective Comments  Patient reports continues to feel well.  Mild tightness in anterior knee,  continues to ice and elevate as part of HEP   -CR         Subjective Pain    Able to rate subjective pain?  yes  -CR      Pre-Treatment Pain Level  0  -CR      Post-Treatment Pain Level  0  -CR         Total Minutes    93845 - PT Therapeutic Exercise Minutes  35  -CR         Exercise 1    Exercise Name 1  NU step  -CR      Time 1  8  -CR         Exercise 2    Exercise Name 2  A/AROM heel slides  -CR      Reps 2  20  -CR      Additional Comments  130 flexion post  -CR         Exercise 3    Exercise Name 3  QS  -CR      Sets 3  3  -CR      Reps 3  10  -CR         Exercise 4    Exercise Name 4  SLR - way  -CR      Sets 4  3  -CR      Reps 4  10  -CR      Additional Comments  2# cuff weight  -CR         Exercise 5    Exercise Name 5  Prone hang   -CR      Time 5  5 minutes  -CR         Exercise 6    Exercise Name 6  Standing heel raises  -CR      Sets 6  1  -CR      Reps 6  15  -CR         Exercise 7    Exercise Name 7  TKE   -CR      Sets 7  10  -CR      Reps 7  3  -CR      Additional Comments  red t band  -CR        User Key  (r) = Recorded By, (t) = Taken By, (c) = Cosigned By    Initials Name Provider Type    Tiburcio Holt PT Physical Therapist                                            Time Calculation:   Start Time: 1100  Therapy Suggested Charges     Code   Minutes Charges    68109 (CPT®)  Pt Neuromusc Re Education Ea 15 Min      51683 (CPT®) Hc Pt Ther Proc Ea 15 Min 35 2    49930 (CPT®) Hc Gait Training Ea 15 Min      57808 (CPT®)  Pt  Therapeutic Act Ea 15 Min      61259 (CPT®) Hc Pt Manual Therapy Ea 15 Min      58367 (CPT®) Hc Pt Ther Massage- Per 15 Min      99855 (CPT®) Hc Pt Iontophoresis Ea 15 Min      00333 (CPT®) Hc Pt Elec Stim Ea-Per 15 Min      73347 (CPT®) Hc Pt Ultrasound Ea 15 Min      22499 (CPT®) Hc Pt Self Care/Mgmt/Train Ea 15 Min      86491 (CPT®) Hc Pt Prosthetic (S) Train Initial Encounter, Each 15 Min      71600 (CPT®) Hc Orthotic(S) Mgmt/Train Initial Encounter, Each 15min      65004 (CPT®) Hc Pt Aquatic Therapy Ea 15 Min      90546 (CPT®) Hc Pt Orthotic(S)/Prosthetic(S) Encounter, Each 15 Min       (CPT®) Hc Pt Electrical Stim Unattended      Total  35 2        Therapy Charges for Today     Code Description Service Date Service Provider Modifiers Qty    58943152261 HC PT THER PROC EA 15 MIN 1/9/2019 Tiburcio Aviles, PT GP 2    06376604533 HC PT HOT/COLD PACK WC NONMCARE 1/9/2019 Tiburcio Aviles, PT GP 1                    Tiburcio Aviles, PT  1/9/2019

## 2019-01-14 ENCOUNTER — HOSPITAL ENCOUNTER (OUTPATIENT)
Dept: PHYSICAL THERAPY | Facility: HOSPITAL | Age: 49
Setting detail: THERAPIES SERIES
Discharge: HOME | End: 2019-01-14

## 2019-01-14 DIAGNOSIS — M25.561 RIGHT KNEE PAIN, UNSPECIFIED CHRONICITY: ICD-10-CM

## 2019-01-14 DIAGNOSIS — M25.561 ACUTE PAIN OF BOTH KNEES: ICD-10-CM

## 2019-01-14 DIAGNOSIS — M25.562 ACUTE PAIN OF BOTH KNEES: ICD-10-CM

## 2019-01-14 DIAGNOSIS — Z47.89 ORTHOPEDIC AFTERCARE: Primary | ICD-10-CM

## 2019-01-14 PROCEDURE — 97010 HOT OR COLD PACKS THERAPY: CPT

## 2019-01-14 PROCEDURE — 97110 THERAPEUTIC EXERCISES: CPT

## 2019-01-14 NOTE — THERAPY TREATMENT NOTE
Outpatient Physical Therapy Ortho Treatment Note  [facility]     Patient Name: Laure Valentin  : 1970  MRN: [id number]  Today's Date: 2019      Visit Date: 2019    Visit Dx:    ICD-10-CM ICD-9-CM   1. Orthopedic aftercare Z47.89 V54.9   2. Right knee pain, unspecified chronicity M25.561 719.46   3. Acute pain of both knees M25.561 338.19    M25.562 719.46       Patient Active Problem List   Diagnosis   • Heart murmur   • Iron deficiency anemia   • Annual GYN exam w/o problems   • Dysuria   • Vitamin D insufficiency   • Hot flashes   • Knee pain, bilateral   • History of bariatric surgery   • Nonintractable episodic headache   • Microscopic hematuria   • Poor iron absorption        Past Medical History:   Diagnosis Date   • GERD (gastroesophageal reflux disease)     resolved w/ WLS   • History of kidney stones     most recent 17   • Kidney stone    • Microcytic anemia    • UTI (urinary tract infection)         Past Surgical History:   Procedure Laterality Date   • CERVICAL CERCLAGE     • COLONOSCOPY     • D&C WITH SUCTION     • GASTRIC SLEEVE LAPAROSCOPIC N/A 2017    Procedure: GASTRIC SLEEVE LAPAROSCOPY   • LAPAROSCOPIC TUBAL LIGATION     • [place] LAP,ESOPHAGOGAST FUNDOPLASTY N/A 2017    Procedure: HIATAL HERNIA REPAIR LAPAROSCOPIC;  Surgeon: Eulalio Dick MD   • WISDOM TOOTH EXTRACTION       PT Assessment/Plan     Row Name 19 0745          PT Assessment    Assessment Comments  Pt is progressing well with graduated strengthening program, with improved AROM flexion/extension this session.  Pt still has a baseline soreness, however has been able to tolerate more activity compared to prior session.  -AC        PT Plan    PT Plan Comments  Progress exercises with care, with manual therapy and modalities as needed.  -AC       User Key  (r) = Recorded By, (t) = Taken By, (c) = Cosigned By    Initials Name Provider Type    AC Toshia Segura, PT  "Physical Therapist        Modalities     Row Name 01/14/19 0745             Subjective Pain    Post-Treatment Pain Level  2  -AC         Ice    Ice Applied  Yes  -AC      Location  R knee  -AC      Rx Minutes  10 mins  -AC      Ice S/P Rx  Yes  -AC        User Key  (r) = Recorded By, (t) = Taken By, (c) = Cosigned By    Initials Name Provider Type    AC Toshia Segura, PT Physical Therapist        Exercises     Row Name 01/14/19 0745             Subjective Comments    Subjective Comments  Pt states she is still having mild soreness, but no changes since prior session.  -AC         Subjective Pain    Able to rate subjective pain?  yes  -AC      Pre-Treatment Pain Level  2 \"A little sore\"  -AC      Post-Treatment Pain Level  2  -AC         Total Minutes    05912 - PT Therapeutic Exercise Minutes  35  -AC         Exercise 1    Exercise Name 1  NU step  -AC      Time 1  7  -AC         Exercise 2    Exercise Name 2  A/AROM heel slides  -AC      Reps 2  20  -AC         Exercise 3    Exercise Name 3  SLR  -AC      Sets 3  3  -AC      Reps 3  10  -AC      Additional Comments  2# ankle cuff  -AC         Exercise 4    Exercise Name 4  Propped weighted stretch into extension  -AC      Time 4  5 min  -AC      Additional Comments  5# cuff weight over distal thigh, pillow folded under ankle  -AC         Exercise 5    Exercise Name 5  TKEs  -AC      Sets 5  3  -AC      Reps 5  10  -AC      Additional Comments  Red TB  -AC         Exercise 6    Exercise Name 6  Standing heel raises from 1/2 foam with ecc loweing  -AC      Sets 6  1  -AC      Reps 6  15  -AC        User Key  (r) = Recorded By, (t) = Taken By, (c) = Cosigned By    Initials Name Provider Type    AC Toshia Segura, PT Physical Therapist        Time Calculation:   Start Time: 0745  Therapy Suggested Charges     Code   Minutes Charges    63525 (CPT®) Hc Pt Neuromusc Re Education Ea 15 Min      19359 (CPT®) Hc Pt Ther Proc Ea 15 Min 35 2    93647 (CPT®) Hc Gait " Training Ea 15 Min      84602 (CPT®) Hc Pt Therapeutic Act Ea 15 Min      03904 (CPT®) Hc Pt Manual Therapy Ea 15 Min      27049 (CPT®) Hc Pt Ther Massage- Per 15 Min      22073 (CPT®) Hc Pt Iontophoresis Ea 15 Min      48527 (CPT®) Hc Pt Elec Stim Ea-Per 15 Min      97381 (CPT®) Hc Pt Ultrasound Ea 15 Min      24656 (CPT®) Hc Pt Self Care/Mgmt/Train Ea 15 Min      92559 (CPT®) Hc Pt Prosthetic (S) Train Initial Encounter, Each 15 Min      82166 (CPT®) Hc Orthotic(S) Mgmt/Train Initial Encounter, Each 15min      73038 (CPT®) Hc Pt Aquatic Therapy Ea 15 Min      75126 (CPT®) Hc Pt Orthotic(S)/Prosthetic(S) Encounter, Each 15 Min       (CPT®) Hc Pt Electrical Stim Unattended      Total  35 2        Therapy Charges for Today     Code Description Service Date Service Provider Modifiers Qty    57403598704 HC PT THER PROC EA 15 MIN 1/14/2019 Toshia Segura, PT GP 2    36951142834 HC PT HOT/COLD PACK WC NONMCARE 1/14/2019 Toshia Segura, PT GP 1        Toshia Segura, PT  1/14/2019

## 2019-01-16 ENCOUNTER — HOSPITAL ENCOUNTER (OUTPATIENT)
Dept: PHYSICAL THERAPY | Facility: HOSPITAL | Age: 49
Setting detail: THERAPIES SERIES
Discharge: HOME | End: 2019-01-16

## 2019-01-16 DIAGNOSIS — Z47.89 ORTHOPEDIC AFTERCARE: Primary | ICD-10-CM

## 2019-01-16 DIAGNOSIS — M25.561 RIGHT KNEE PAIN, UNSPECIFIED CHRONICITY: ICD-10-CM

## 2019-01-16 PROCEDURE — 97110 THERAPEUTIC EXERCISES: CPT

## 2019-01-16 PROCEDURE — 97010 HOT OR COLD PACKS THERAPY: CPT

## 2019-01-16 NOTE — THERAPY TREATMENT NOTE
Outpatient Physical Therapy Ortho Treatment Note  [facility]     Patient Name: Laure Valentin  : 1970  MRN: [id number]  Today's Date: 2019      Visit Date: 2019    Visit Dx:    ICD-10-CM ICD-9-CM   1. Orthopedic aftercare Z47.89 V54.9   2. Right knee pain, unspecified chronicity M25.561 719.46       Patient Active Problem List   Diagnosis   • Heart murmur   • Iron deficiency anemia   • Annual GYN exam w/o problems   • Dysuria   • Vitamin D insufficiency   • Hot flashes   • Knee pain, bilateral   • History of bariatric surgery   • Nonintractable episodic headache   • Microscopic hematuria   • Poor iron absorption        Past Medical History:   Diagnosis Date   • GERD (gastroesophageal reflux disease)     resolved w/ WLS   • History of kidney stones     most recent 17   • Kidney stone    • Microcytic anemia    • UTI (urinary tract infection)         Past Surgical History:   Procedure Laterality Date   • CERVICAL CERCLAGE     • COLONOSCOPY     • D&C WITH SUCTION     • GASTRIC SLEEVE LAPAROSCOPIC N/A 2017    Procedure: GASTRIC SLEEVE LAPAROSCOPY   • LAPAROSCOPIC TUBAL LIGATION     • [place] LAP,ESOPHAGOGAST FUNDOPLASTY N/A 2017    Procedure: HIATAL HERNIA REPAIR LAPAROSCOPIC;  Surgeon: Eulalio Dick MD   • WISDOM TOOTH EXTRACTION       PT Assessment/Plan     Row Name 19 0824          PT Assessment    Assessment Comments  Pt able to tolerate progressions in resistance for SLRs and supine exercises well. Pt has some intermittent locking with end-range extension, but overall tolerates exercises well. ROM measured 0-126 today.    -AC        PT Plan    PT Plan Comments  Re-measure strength and ROM next session.  Incorporate balance/proprioception and step-ups as tolerated.  -AC       User Key  (r) = Recorded By, (t) = Taken By, (c) = Cosigned By    Initials Name Provider Type    AC Toshia Segura, PT Physical Therapist        Modalities     Row  Name 01/16/19 0830             Ice    Ice Applied  Yes  -AC      Location  R knee  -AC      Rx Minutes  10 mins  -AC      Ice S/P Rx  Yes  -AC        User Key  (r) = Recorded By, (t) = Taken By, (c) = Cosigned By    Initials Name Provider Type    Toshia Guadalupe, PT Physical Therapist        Exercises     Row Name 01/16/19 0830             Subjective Comments    Subjective Comments  Pt states her knee is feeling good; no new complaints.  -AC         Subjective Pain    Able to rate subjective pain?  yes  -AC      Pre-Treatment Pain Level  2  -AC         Total Minutes    61805 - PT Therapeutic Exercise Minutes  30  -AC         Exercise 1    Exercise Name 1  Exercises performed in clinical setting as per flow sheet.  -AC      Cueing 1  Verbal;Tactile;Demo  -AC      Time 1  30  -AC      Additional Comments  Ther Ex  -AC        User Key  (r) = Recorded By, (t) = Taken By, (c) = Cosigned By    Initials Name Provider Type    Toshia Guadalupe, PT Physical Therapist        Time Calculation:   Start Time: 0830  Therapy Suggested Charges     Code   Minutes Charges    71635 (CPT®) Hc Pt Neuromusc Re Education Ea 15 Min      58945 (CPT®) Hc Pt Ther Proc Ea 15 Min 30 2    91783 (CPT®) Hc Gait Training Ea 15 Min      97850 (CPT®) Hc Pt Therapeutic Act Ea 15 Min      26216 (CPT®) Hc Pt Manual Therapy Ea 15 Min      23589 (CPT®) Hc Pt Ther Massage- Per 15 Min      83778 (CPT®) Hc Pt Iontophoresis Ea 15 Min      24532 (CPT®) Hc Pt Elec Stim Ea-Per 15 Min      20473 (CPT®) Hc Pt Ultrasound Ea 15 Min      31886 (CPT®) Hc Pt Self Care/Mgmt/Train Ea 15 Min      89078 (CPT®) Hc Pt Prosthetic (S) Train Initial Encounter, Each 15 Min      71071 (CPT®) Hc Orthotic(S) Mgmt/Train Initial Encounter, Each 15min      48279 (CPT®) Hc Pt Aquatic Therapy Ea 15 Min      20470 (CPT®) Hc Pt Orthotic(S)/Prosthetic(S) Encounter, Each 15 Min       (CPT®) Hc Pt Electrical Stim Unattended      Total  30 2        Therapy Charges for Today      Code Description Service Date Service Provider Modifiers Qty    91102989664 HC PT THER PROC EA 15 MIN 1/16/2019 Toshia Segura, PT GP 2    83648704928 HC PT HOT/COLD PACK WC NONMCARE 1/16/2019 Toshia Segura, PT GP 1        Toshia Segura, PT  1/16/2019

## 2019-01-21 ENCOUNTER — OFFICE VISIT (OUTPATIENT)
Dept: ORTHOPEDIC SURGERY | Facility: CLINIC | Age: 49
End: 2019-01-21

## 2019-01-21 ENCOUNTER — HOSPITAL ENCOUNTER (OUTPATIENT)
Dept: PHYSICAL THERAPY | Facility: HOSPITAL | Age: 49
Setting detail: THERAPIES SERIES
Discharge: HOME | End: 2019-01-21

## 2019-01-21 DIAGNOSIS — M25.561 RIGHT KNEE PAIN, UNSPECIFIED CHRONICITY: ICD-10-CM

## 2019-01-21 DIAGNOSIS — M25.562 ACUTE PAIN OF BOTH KNEES: ICD-10-CM

## 2019-01-21 DIAGNOSIS — Z47.89 ORTHOPEDIC AFTERCARE: Primary | ICD-10-CM

## 2019-01-21 DIAGNOSIS — M25.561 ACUTE PAIN OF BOTH KNEES: ICD-10-CM

## 2019-01-21 PROCEDURE — 97535 SELF CARE MNGMENT TRAINING: CPT

## 2019-01-21 PROCEDURE — 97110 THERAPEUTIC EXERCISES: CPT

## 2019-01-21 PROCEDURE — 99024 POSTOP FOLLOW-UP VISIT: CPT | Performed by: ORTHOPAEDIC SURGERY

## 2019-01-21 NOTE — THERAPY TREATMENT NOTE
Outpatient Physical Therapy Ortho Treatment Note  [facility]     Patient Name: Laure Valentin  : 1970  MRN: [id number]  Today's Date: 2019      Visit Date: 2019    Visit Dx:    ICD-10-CM ICD-9-CM   1. Orthopedic aftercare Z47.89 V54.9   2. Right knee pain, unspecified chronicity M25.561 719.46   3. Acute pain of both knees M25.561 338.19    M25.562 719.46       Patient Active Problem List   Diagnosis   • Heart murmur   • Iron deficiency anemia   • Annual GYN exam w/o problems   • Dysuria   • Vitamin D insufficiency   • Hot flashes   • Knee pain, bilateral   • History of bariatric surgery   • Nonintractable episodic headache   • Microscopic hematuria   • Poor iron absorption        Past Medical History:   Diagnosis Date   • GERD (gastroesophageal reflux disease)     resolved w/ WLS   • History of kidney stones     most recent 17   • Kidney stone    • Microcytic anemia    • UTI (urinary tract infection)         Past Surgical History:   Procedure Laterality Date   • CERVICAL CERCLAGE     • COLONOSCOPY     • D&C WITH SUCTION     • GASTRIC SLEEVE LAPAROSCOPIC N/A 2017    Procedure: GASTRIC SLEEVE LAPAROSCOPY   • LAPAROSCOPIC TUBAL LIGATION     • [place] LAP,ESOPHAGOGAST FUNDOPLASTY N/A 2017    Procedure: HIATAL HERNIA REPAIR LAPAROSCOPIC;  Surgeon: Eulalio Dick MD   • WISDOM TOOTH EXTRACTION         PT Ortho     Row Name 19 0838       Myotomal Screen- Lower Quarter Clearing    Hip flexion (L2)  Bilateral:;4 (Good)  -AC    Knee extension (L3)  Right:;4+ (Good +);Left:;5 (Normal)  -AC    Knee flexion (S2)  Right:;4+ (Good +);Left:;5 (Normal) Pulling in anterior knee  -AC       Right Lower Ext    Rt Knee Extension/Flexion AROM  0-127  -AC       MMT Right Lower Ext    Rt Hip Extension MMT, Gross Movement  (4+/5) good plus  -AC    Rt Hip ABduction MMT, Gross Movement  (5/5) normal  -AC      User Key  (r) = Recorded By, (t) = Taken By, (c) = Cosigned By     Initials Name Provider Type    Toshia Guadalupe, PT Physical Therapist        PT Assessment/Plan     Row Name 01/21/19 0875          PT Assessment    Assessment Comments  Pt is progressing well s/p partial medial meniscectomy and arthroscopy.  Pt demonstrates ROM and strength WNL at this time, with only mild pain with resisted knee flexion.  Pt is ambulating without crutches and has been able to perform light activities at the gym with modifications.  Pt educated on biomechanics for light plyometric exercises and advised to discontinue any exercises that provoke R knee pain.  Encouraged to perform static/dynamic stretches for quads/hamstrings as well.  Pt is to follow-up with orthopedic surgeon today to determine if further care is warranted.   -AC        PT Plan    PT Plan Comments  Progress exercises with care as necessary.  -AC       User Key  (r) = Recorded By, (t) = Taken By, (c) = Cosigned By    Initials Name Provider Type    Toshia Guadalupe, PT Physical Therapist        Exercises     Row Name 01/21/19 0878             Subjective Comments    Subjective Comments  Pt reports improvement in knee pain.  Occasional mild pain and popping.  Has not had to take Ibuprofen.  Has been able to do squats and partial lunges at the gym, but has not tried higher impact exercises (burpees, jumping).  -AC         Subjective Pain    Able to rate subjective pain?  yes  -AC      Pre-Treatment Pain Level  0 Just popping earlier, but no pain now  -AC      Post-Treatment Pain Level  0  -AC         Total Minutes    28633 - PT Therapeutic Exercise Minutes  17  -AC         Exercise 1    Exercise Name 1  Performed reassessment measures and provided patient light plyometric progressions to return to gym program.  -AC      Cueing 1  Verbal;Demo  -AC      Time 1  17  -AC        User Key  (r) = Recorded By, (t) = Taken By, (c) = Cosigned By    Initials Name Provider Type    Toshia Guadalupe, PT Physical Therapist         PT OP Goals     Row Name 01/21/19 0838          PT Short Term Goals    STG Date to Achieve  01/23/19  -AC     STG 1  Decrease R knee pain frequency/intensity by > or = 50%.  -AC     STG 1 Progress  Met  -AC     STG 2  Perform independent HEP to improve mobility of R knee and strength.  -AC     STG 2 Progress  Met  -AC     STG 3  Improve R knee flexion ROM to 120 degrees, and extension to neutral.  -AC     STG 3 Progress  Met  -AC     STG 4  Enable ability to walk without requiring use of crutch.  -AC     STG 4 Progress  Met  -AC        Long Term Goals    LTG Date to Achieve  02/13/19  -AC     LTG 1  Decrease R knee pain frequency/intensity by > or = 75%.  -AC     LTG 1 Progress  Ongoing;Progressing  -AC     LTG 2  Improve LEFS score to > or = 50/80 to reflect significant improvement in use of RLE for ADLs/functional mobility.  -AC     LTG 2 Progress  Met 65/80  -AC     LTG 3  Enable pain-free squatting.  -AC     LTG 3 Progress  Partially Met Minimal-no pain with partial squats  -AC     LTG 3 Progress Comments  Provided modifications to squat form (legs ~ER to increase hip loading), which improved pain  -AC        Time Calculation    PT Goal Re-Cert Due Date  04/02/19  -       User Key  (r) = Recorded By, (t) = Taken By, (c) = Cosigned By    Initials Name Provider Type    Toshia Guadalupe, PT Physical Therapist        Therapy Education  Education Details: Pt encouraged to avoid pivoting on R knee with functional activities/gym workouts (defined as twisting knee on a planted foot).  Provided modifications to Arnaldo exercises to prevent reinjury.  Given progression to light plyometrics and encouraged to avoid any exercises that increase pain (squat jumps, forward/back and side-side squat jumps, Heisman hops, and creek hops).  Provided dynamic and static stretching for quads/hamstrings for before/after workouts (lunge stretch, sustained quad stretch in standing, long sitting or standing hamstring  stretch).  Given: HEP  Program: Progressed, Modified  How Provided: Verbal, Demonstration, Written  Provided to: Patient  Level of Understanding: Verbalized, Demonstrated  11725 - PT Self Care/Mgmt Minutes: 13    Outcome Measure Options: Lower Extremity Functional Scale (LEFS)  Lower Extremity Functional Index  Any of your usual work, housework or school activities: A little bit of difficulty  Your usual hobbies, recreational or sporting activities: A little bit of difficulty  Getting into or out of the bath: A little bit of difficulty  Walking between rooms: No difficulty  Putting on your shoes or socks: No difficulty  Squatting: A little bit of difficulty  Lifting an object, like a bag of groceries from the floor: No difficulty  Performing light activities around your home: No difficulty  Performing heavy activities around your home: A little bit of difficulty  Getting into or out of a car: No difficulty  Walking 2 blocks: A little bit of difficulty  Walking a mile: A little bit of difficulty  Going up or down 10 stairs (about 1 flight of stairs): A little bit of difficulty  Standing for 1 hour: No difficulty  Sitting for 1 hour: No difficulty  Running on even ground: Moderate difficulty  Running on uneven ground: Moderate difficulty  Making sharp turns while running fast: Moderate difficulty  Hopping: A little bit of difficulty  Rolling over in bed: No difficulty  Total: 65    Time Calculation:   Start Time: 0838  Therapy Suggested Charges     Code   Minutes Charges    85864 (CPT®) Hc Pt Neuromusc Re Education Ea 15 Min      31942 (CPT®) Hc Pt Ther Proc Ea 15 Min 17 1    01030 (CPT®) Hc Gait Training Ea 15 Min      48695 (CPT®) Hc Pt Therapeutic Act Ea 15 Min      23734 (CPT®) Hc Pt Manual Therapy Ea 15 Min      87735 (CPT®) Hc Pt Ther Massage- Per 15 Min      45825 (CPT®) Hc Pt Iontophoresis Ea 15 Min      15852 (CPT®) Hc Pt Elec Stim Ea-Per 15 Min      02858 (CPT®) Hc Pt Ultrasound Ea 15 Min      18767 (CPT®)  Hc Pt Self Care/Mgmt/Train Ea 15 Min 13 1    38705 (CPT®) Hc Pt Prosthetic (S) Train Initial Encounter, Each 15 Min      10092 (CPT®) Hc Orthotic(S) Mgmt/Train Initial Encounter, Each 15min      27544 (CPT®) Hc Pt Aquatic Therapy Ea 15 Min      85439 (CPT®) Hc Pt Orthotic(S)/Prosthetic(S) Encounter, Each 15 Min       (CPT®) Hc Pt Electrical Stim Unattended      Total  30 2        Therapy Charges for Today     Code Description Service Date Service Provider Modifiers Qty    91987758780 HC PT THER PROC EA 15 MIN 1/21/2019 Toshia Segura, PT GP 1    70751936245 HC PT SELF CARE/MGMT/TRAIN EA 15 MIN 1/21/2019 Toshia Segura, PT GP 1        PT G-Codes  Outcome Measure Options: Lower Extremity Functional Scale (LEFS)  Total: 65         Toshia Segura, PT  1/21/2019

## 2019-01-21 NOTE — PROGRESS NOTES
[facility]    Subjective     Chief Complaint   Patient presents with   • Post-op     5 weeks s/p (R) knee arthroscopy with partial medial menisectomy and patellar chondroplasty 12/14/2018        HPI    Laure Valentin is a 48 y.o. female.  She falls up today for right knee arthroscopy.  80% improvement compared to her preoperative symptoms, and she is doing well overall.  She had improvement with physical therapy.  She does have some underlying degeneration in the knee.      Patient Active Problem List   Diagnosis   • Heart murmur   • Iron deficiency anemia   • Annual GYN exam w/o problems   • Dysuria   • Vitamin D insufficiency   • Hot flashes   • Knee pain, bilateral   • History of bariatric surgery   • Nonintractable episodic headache   • Microscopic hematuria   • Poor iron absorption     Past Medical History:   Diagnosis Date   • GERD (gastroesophageal reflux disease)     resolved w/ WLS   • History of kidney stones 2011    most recent 12/24/17   • Kidney stone    • Microcytic anemia    • UTI (urinary tract infection)       Past Surgical History:   Procedure Laterality Date   • CERVICAL CERCLAGE  2001   • COLONOSCOPY  2011   • D&C WITH SUCTION  1997   • GASTRIC SLEEVE LAPAROSCOPIC N/A 1/26/2017    Procedure: GASTRIC SLEEVE LAPAROSCOPY   • LAPAROSCOPIC TUBAL LIGATION  2002   • [place] LAP,ESOPHAGOGAST FUNDOPLASTY N/A 1/26/2017    Procedure: HIATAL HERNIA REPAIR LAPAROSCOPIC;  Surgeon: Eulalio Dick MD   • WISDOM TOOTH EXTRACTION  1992      Family History   Problem Relation Age of Onset   • Hypertension Mother    • Hyperlipidemia Mother    • Osteoarthritis Mother    • No Known Problems Father    • Breast cancer Neg Hx    • Ovarian cancer Neg Hx      Social History     Socioeconomic History   • Marital status:      Spouse name: Not on file   • Number of children: Not on file   • Years of education: Not on file   • Highest education level: Not on file   Social Needs   • Financial  resource strain: Not on file   • Food insecurity - worry: Not on file   • Food insecurity - inability: Not on file   • Transportation needs - medical: Not on file   • Transportation needs - non-medical: Not on file   Occupational History   • Not on file   Tobacco Use   • Smoking status: Never Smoker   • Smokeless tobacco: Never Used   Substance and Sexual Activity   • Alcohol use: No   • Drug use: No   • Sexual activity: Defer   Other Topics Concern   • Not on file   Social History Narrative   • Not on file      Current Outpatient Medications on File Prior to Visit   Medication Sig Dispense Refill   • ferrous sulfate 325 (65 FE) MG tablet Take 1 tablet by mouth 3 (Three) Times a Day. 90 tablet 0   • hepatitis A (HAVRIX) 1440 EL U/ML vaccine Inject 1 mL into the appropriate muscle as directed by prescriber. Repeat in 6 months 1 mL 1   • ibuprofen (ADVIL,MOTRIN) 400 MG tablet Take 400 mg by mouth As Needed for Mild Pain .     • Multiple Vitamins-Minerals (MULTIVITAMIN ADULT PO) Take 1 tablet by mouth Daily.       No current facility-administered medications on file prior to visit.       No Known Allergies     Review of Systems     Objective      Physical Exam  There were no vitals taken for this visit.    There is no height or weight on file to calculate BMI.    General:   Mental Status:  Alert   Appearance: Cooperative, in no acute distress   Build and Nutrition: Well-nourished and well developed female   Orientation: Alert and oriented to person, place and time   Posture: Normal   Gait: Normal    Lower Extremities:   Right Knee:    Tenderness:  Mild medial and lateral joint line tenderness    Effusion:  None    Swelling: None    Crepitus:  None    Range of motion:  Extension: 0°       Flexion: 130°  Instability:  No varus laxity, no valgus laxity, negative anterior drawer  Deformities:  None        Assessment and Plan     Laure was seen today for post-op.    Diagnoses and all orders for this visit:    Orthopedic  aftercare        I reviewed my findings with patient today.  She's doing well following her right knee arthroscopy.  I will see her back in the future she has any problems.    Return if symptoms worsen or fail to improve.          El Gill MD  01/21/19  10:55 AM

## 2019-03-05 ENCOUNTER — LAB (OUTPATIENT)
Dept: LAB | Facility: HOSPITAL | Age: 49
End: 2019-03-05

## 2019-03-05 DIAGNOSIS — R89.9 ABNORMAL LABORATORY TEST: ICD-10-CM

## 2019-03-05 DIAGNOSIS — D50.9 IRON DEFICIENCY ANEMIA, UNSPECIFIED IRON DEFICIENCY ANEMIA TYPE: ICD-10-CM

## 2019-03-05 DIAGNOSIS — R31.29 MICROSCOPIC HEMATURIA: ICD-10-CM

## 2019-03-05 LAB
BACTERIA UR QL AUTO: ABNORMAL /HPF
BILIRUB UR QL STRIP: NEGATIVE
CLARITY UR: CLEAR
CO2 SERPL-SCNC: 24 MMOL/L (ref 20–31)
COLOR UR: YELLOW
GLUCOSE UR STRIP-MCNC: NEGATIVE MG/DL
HGB UR QL STRIP.AUTO: ABNORMAL
HYALINE CASTS UR QL AUTO: ABNORMAL /LPF
IRON 24H UR-MRATE: 29 MCG/DL (ref 50–175)
KETONES UR QL STRIP: ABNORMAL
LEUKOCYTE ESTERASE UR QL STRIP.AUTO: NEGATIVE
NITRITE UR QL STRIP: NEGATIVE
PH UR STRIP.AUTO: 5.5 [PH] (ref 5–8)
PROT UR QL STRIP: NEGATIVE
RBC # UR: ABNORMAL /HPF
REF LAB TEST METHOD: ABNORMAL
SP GR UR STRIP: 1.04 (ref 1–1.03)
SQUAMOUS #/AREA URNS HPF: ABNORMAL /HPF
UROBILINOGEN UR QL STRIP: ABNORMAL
WBC UR QL AUTO: ABNORMAL /HPF

## 2019-03-05 PROCEDURE — 82374 ASSAY BLOOD CARBON DIOXIDE: CPT

## 2019-03-05 PROCEDURE — 36415 COLL VENOUS BLD VENIPUNCTURE: CPT

## 2019-03-05 PROCEDURE — 83540 ASSAY OF IRON: CPT

## 2019-03-05 PROCEDURE — 81001 URINALYSIS AUTO W/SCOPE: CPT

## 2019-03-05 PROCEDURE — 87086 URINE CULTURE/COLONY COUNT: CPT

## 2019-03-07 ENCOUNTER — OFFICE VISIT (OUTPATIENT)
Dept: BARIATRICS/WEIGHT MGMT | Facility: CLINIC | Age: 49
End: 2019-03-07

## 2019-03-07 VITALS
DIASTOLIC BLOOD PRESSURE: 68 MMHG | HEART RATE: 64 BPM | RESPIRATION RATE: 18 BRPM | BODY MASS INDEX: 28.79 KG/M2 | OXYGEN SATURATION: 99 % | SYSTOLIC BLOOD PRESSURE: 122 MMHG | HEIGHT: 63 IN | TEMPERATURE: 97.8 F | WEIGHT: 162.5 LBS

## 2019-03-07 DIAGNOSIS — R53.83 FATIGUE, UNSPECIFIED TYPE: ICD-10-CM

## 2019-03-07 DIAGNOSIS — Z13.0 SCREENING, IRON DEFICIENCY ANEMIA: ICD-10-CM

## 2019-03-07 DIAGNOSIS — E55.9 HYPOVITAMINOSIS D: ICD-10-CM

## 2019-03-07 DIAGNOSIS — Z13.21 MALNUTRITION SCREEN: ICD-10-CM

## 2019-03-07 DIAGNOSIS — Z98.84 STATUS POST BARIATRIC SURGERY: Primary | ICD-10-CM

## 2019-03-07 DIAGNOSIS — D50.9 IRON DEFICIENCY ANEMIA, UNSPECIFIED IRON DEFICIENCY ANEMIA TYPE: ICD-10-CM

## 2019-03-07 DIAGNOSIS — K91.2 POSTGASTRECTOMY MALABSORPTION: ICD-10-CM

## 2019-03-07 DIAGNOSIS — Z90.3 POSTGASTRECTOMY MALABSORPTION: ICD-10-CM

## 2019-03-07 LAB — BACTERIA SPEC AEROBE CULT: NORMAL

## 2019-03-07 PROCEDURE — 99214 OFFICE O/P EST MOD 30 MIN: CPT | Performed by: PHYSICIAN ASSISTANT

## 2019-03-07 NOTE — PROGRESS NOTES
[facility] Bariatric Surgery  [address]  [facility] [id number] [phone]        Patient Name:  Laure Valentin.  :  1970        Reason for Visit:   Annual Eval  2 years postop    HPI: Laure Valentin is a 48 y.o. female s/p LSG/HHR by GDW on 17    LOV 18- doing well    Doing well.  Had right meniscal repair recently, recovering- back to gym 3 days a week now, not back to her 7 day a week training that she was doing previously. Overall feeling really good, pleased with progress and where she is with her weight. Scares her a little that she can eat more than she could initially.      Denies dysphagia, reflux, nausea, vomiting, abdominal pain, pulmonary issues and fevers.  Getting 100+g prot/day, shakes occasionally, mostly foods. A lot of meat/ cheese.  Eating 3 meals + a couple snacks of nuts and cheese a day.  Drinking 64+ fluid oz/day.  Last labs revealed prealbumin 18.5, ferritin 6- advised iron infusion. Iron infusion x 2 10/2018. Has recent iron drawn a couple days ago, doesn't have results yet- in epic.  Taking Patches: MVI.  Not requiring antacid.  Exercising 3 days a week at gym.      Presurgery weight: 256 pounds.  Today's weight is 73.7 kg (162 lb 8 oz) pounds, today's  Body mass index is 28.79 kg/m²., and her weight loss since surgery is 94 pounds.  7lb gain from LOV.     Past Medical History:   Diagnosis Date   • GERD (gastroesophageal reflux disease)     resolved w/ WLS   • History of kidney stones     most recent 17   • Kidney stone    • Microcytic anemia    • UTI (urinary tract infection)      Past Surgical History:   Procedure Laterality Date   • CERVICAL CERCLAGE     • COLONOSCOPY     • D&C WITH SUCTION     • GASTRIC SLEEVE LAPAROSCOPIC N/A 2017    Procedure: GASTRIC SLEEVE LAPAROSCOPY   • LAPAROSCOPIC TUBAL LIGATION     • [place] LAP,ESOPHAGOGAST FUNDOPLASTY N/A 2017    Procedure: HIATAL HERNIA REPAIR  "LAPAROSCOPIC;  Surgeon: Eulalio Dick MD   • WISDOM TOOTH EXTRACTION  1992     Outpatient Medications Marked as Taking for the 3/7/19 encounter (Office Visit) with Venus Leon PA-C   Medication Sig Dispense Refill   • ibuprofen (ADVIL,MOTRIN) 400 MG tablet Take 400 mg by mouth As Needed for Mild Pain .     • Multiple Vitamins-Minerals (MULTIVITAMIN ADULT PO) Take 1 tablet by mouth Daily.         No Known Allergies    Social History     Socioeconomic History   • Marital status:      Spouse name: Not on file   • Number of children: Not on file   • Years of education: Not on file   • Highest education level: Not on file   Social Needs   • Financial resource strain: Not on file   • Food insecurity - worry: Not on file   • Food insecurity - inability: Not on file   • Transportation needs - medical: Not on file   • Transportation needs - non-medical: Not on file   Occupational History   • Not on file   Tobacco Use   • Smoking status: Never Smoker   • Smokeless tobacco: Never Used   Substance and Sexual Activity   • Alcohol use: No   • Drug use: No   • Sexual activity: Defer   Other Topics Concern   • Not on file   Social History Narrative   • Not on file       /68 (BP Location: Left arm, Patient Position: Sitting, Cuff Size: Large Adult)   Pulse 64   Temp 97.8 °F (36.6 °C) (Temporal)   Resp 18   Ht 160 cm (63\")   Wt 73.7 kg (162 lb 8 oz)   SpO2 99%   BMI 28.79 kg/m²     Physical Exam   Constitutional: She is oriented to person, place, and time. She appears well-developed and well-nourished.   HENT:   Head: Normocephalic and atraumatic.   Cardiovascular: Normal rate, regular rhythm and normal heart sounds.   Pulmonary/Chest: Effort normal and breath sounds normal. No respiratory distress. She has no wheezes.   Abdominal: Soft. Bowel sounds are normal. She exhibits no distension. There is no tenderness.   Loose skin   Neurological: She is alert and oriented to person, place, and time.   Skin: " Skin is warm and dry.   Psychiatric: She has a normal mood and affect. Her behavior is normal. Judgment and thought content normal.         Assessment:  2 years postop s/p LSG/HHR by SHAWNEE on 1/26/17    ICD-10-CM ICD-9-CM   1. Status post bariatric surgery Z98.84 V45.86   2. Hypovitaminosis D E55.9 268.9   3. Screening, iron deficiency anemia Z13.0 V78.0   4. Iron deficiency anemia, unspecified iron deficiency anemia type D50.9 280.9   5. Malnutrition screen Z13.21 V77.2   6. Postgastrectomy malabsorption K91.2 579.3    Z90.3    7. Fatigue, unspecified type R53.83 780.79         Plan:  Doing well. Discussed caloric goals per odilia. Continue w/ good food choices and healthy habits.  Continue to focus on high protein, low carb.  Keep tracking intake.  Continue routine exercise.  Routine bariatric labs ordered. Likely need daily PO iron, will check ferritin for further recs.  Continue vitamins w/ adjustments pending lab results.  Call w/ problems/concerns.     The patient was instructed to follow up in 1 year, sooner if needed.      Total time spent w/ patient 25 minutes and 15 minutes spent counseling the patient on nutrition and necessary dietary/lifestyle modifications.

## 2019-03-13 ENCOUNTER — LAB (OUTPATIENT)
Dept: LAB | Facility: HOSPITAL | Age: 49
End: 2019-03-13

## 2019-03-13 DIAGNOSIS — E55.9 HYPOVITAMINOSIS D: ICD-10-CM

## 2019-03-13 DIAGNOSIS — Z13.21 MALNUTRITION SCREEN: ICD-10-CM

## 2019-03-13 DIAGNOSIS — K91.2 POSTGASTRECTOMY MALABSORPTION: ICD-10-CM

## 2019-03-13 DIAGNOSIS — D50.9 IRON DEFICIENCY ANEMIA, UNSPECIFIED IRON DEFICIENCY ANEMIA TYPE: ICD-10-CM

## 2019-03-13 DIAGNOSIS — R53.83 FATIGUE, UNSPECIFIED TYPE: ICD-10-CM

## 2019-03-13 DIAGNOSIS — Z90.3 POSTGASTRECTOMY MALABSORPTION: ICD-10-CM

## 2019-03-13 DIAGNOSIS — Z13.0 SCREENING, IRON DEFICIENCY ANEMIA: ICD-10-CM

## 2019-03-13 LAB
25(OH)D3 SERPL-MCNC: 55.8 NG/ML
ALBUMIN SERPL-MCNC: 4.39 G/DL (ref 3.2–4.8)
ALBUMIN/GLOB SERPL: 1.7 G/DL (ref 1.5–2.5)
ALP SERPL-CCNC: 51 U/L (ref 25–100)
ALT SERPL W P-5'-P-CCNC: 20 U/L (ref 7–40)
ANION GAP SERPL CALCULATED.3IONS-SCNC: 9 MMOL/L (ref 3–11)
AST SERPL-CCNC: 25 U/L (ref 0–33)
BASOPHILS # BLD AUTO: 0.05 10*3/MM3 (ref 0–0.2)
BASOPHILS NFR BLD AUTO: 1.1 % (ref 0–1)
BILIRUB SERPL-MCNC: 0.3 MG/DL (ref 0.3–1.2)
BUN BLD-MCNC: 15 MG/DL (ref 9–23)
BUN/CREAT SERPL: 20.3 (ref 7–25)
CALCIUM SPEC-SCNC: 9.5 MG/DL (ref 8.7–10.4)
CHLORIDE SERPL-SCNC: 106 MMOL/L (ref 99–109)
CO2 SERPL-SCNC: 26 MMOL/L (ref 20–31)
CREAT BLD-MCNC: 0.74 MG/DL (ref 0.6–1.3)
DEPRECATED RDW RBC AUTO: 44.6 FL (ref 37–54)
EOSINOPHIL # BLD AUTO: 0.08 10*3/MM3 (ref 0–0.3)
EOSINOPHIL NFR BLD AUTO: 1.8 % (ref 0–3)
ERYTHROCYTE [DISTWIDTH] IN BLOOD BY AUTOMATED COUNT: 14.4 % (ref 11.3–14.5)
FERRITIN SERPL-MCNC: 9 NG/ML (ref 10–291)
FOLATE SERPL-MCNC: 6.18 NG/ML (ref 3.2–20)
GFR SERPL CREATININE-BSD FRML MDRD: 102 ML/MIN/1.73
GLOBULIN UR ELPH-MCNC: 2.6 GM/DL
GLUCOSE BLD-MCNC: 65 MG/DL (ref 70–100)
HCT VFR BLD AUTO: 33.5 % (ref 34.5–44)
HGB BLD-MCNC: 10.1 G/DL (ref 11.5–15.5)
IMM GRANULOCYTES # BLD AUTO: 0.01 10*3/MM3 (ref 0–0.05)
IMM GRANULOCYTES NFR BLD AUTO: 0.2 % (ref 0–0.6)
LYMPHOCYTES # BLD AUTO: 2.14 10*3/MM3 (ref 0.6–4.8)
LYMPHOCYTES NFR BLD AUTO: 47.7 % (ref 24–44)
MCH RBC QN AUTO: 25.6 PG (ref 27–31)
MCHC RBC AUTO-ENTMCNC: 30.1 G/DL (ref 32–36)
MCV RBC AUTO: 85 FL (ref 80–99)
MONOCYTES # BLD AUTO: 0.38 10*3/MM3 (ref 0–1)
MONOCYTES NFR BLD AUTO: 8.5 % (ref 0–12)
NEUTROPHILS # BLD AUTO: 1.84 10*3/MM3 (ref 1.5–8.3)
NEUTROPHILS NFR BLD AUTO: 40.9 % (ref 41–71)
PLATELET # BLD AUTO: 401 10*3/MM3 (ref 150–450)
PMV BLD AUTO: 10.6 FL (ref 6–12)
POTASSIUM BLD-SCNC: 4.6 MMOL/L (ref 3.5–5.5)
PREALB SERPL-MCNC: 16.8 MG/DL (ref 10–40)
PROT SERPL-MCNC: 7 G/DL (ref 5.7–8.2)
RBC # BLD AUTO: 3.94 10*6/MM3 (ref 3.89–5.14)
SODIUM BLD-SCNC: 141 MMOL/L (ref 132–146)
WBC NRBC COR # BLD: 4.49 10*3/MM3 (ref 3.5–10.8)

## 2019-03-13 PROCEDURE — 84134 ASSAY OF PREALBUMIN: CPT

## 2019-03-13 PROCEDURE — 82306 VITAMIN D 25 HYDROXY: CPT

## 2019-03-13 PROCEDURE — 83921 ORGANIC ACID SINGLE QUANT: CPT

## 2019-03-13 PROCEDURE — 80053 COMPREHEN METABOLIC PANEL: CPT

## 2019-03-13 PROCEDURE — 84425 ASSAY OF VITAMIN B-1: CPT

## 2019-03-13 PROCEDURE — 82728 ASSAY OF FERRITIN: CPT

## 2019-03-13 PROCEDURE — 85025 COMPLETE CBC W/AUTO DIFF WBC: CPT

## 2019-03-13 PROCEDURE — 82746 ASSAY OF FOLIC ACID SERUM: CPT

## 2019-03-13 PROCEDURE — 36415 COLL VENOUS BLD VENIPUNCTURE: CPT

## 2019-03-17 LAB — VIT B1 BLD-SCNC: 80.1 NMOL/L (ref 66.5–200)

## 2019-03-18 LAB
Lab: NORMAL
METHYLMALONATE SERPL-SCNC: 76 NMOL/L (ref 0–378)

## 2019-05-01 ENCOUNTER — DOCUMENTATION (OUTPATIENT)
Dept: PHYSICAL THERAPY | Facility: HOSPITAL | Age: 49
End: 2019-05-01

## 2019-05-01 DIAGNOSIS — Z47.89 ORTHOPEDIC AFTERCARE: Primary | ICD-10-CM

## 2019-05-01 DIAGNOSIS — M25.561 ACUTE PAIN OF BOTH KNEES: ICD-10-CM

## 2019-05-01 DIAGNOSIS — M25.561 RIGHT KNEE PAIN, UNSPECIFIED CHRONICITY: ICD-10-CM

## 2019-05-01 DIAGNOSIS — M25.562 ACUTE PAIN OF BOTH KNEES: ICD-10-CM

## 2019-05-01 NOTE — THERAPY DISCHARGE NOTE
Outpatient Physical Therapy Discharge Summary         Patient Name: Laure Valentin  : 1970  MRN: [id number]    Today's Date: 2019    Visit Dx:    ICD-10-CM ICD-9-CM   1. Orthopedic aftercare Z47.89 V54.9   2. Right knee pain, unspecified chronicity M25.561 719.46   3. Acute pain of both knees M25.561 338.19    M25.562 719.46       PT OP Goals     Row Name 19 1420          PT Short Term Goals    STG Date to Achieve  19  -AC     STG 1  Decrease R knee pain frequency/intensity by > or = 50%.  -AC     STG 1 Progress  Met  -AC     STG 2  Perform independent HEP to improve mobility of R knee and strength.  -AC     STG 2 Progress  Met  -AC     STG 3  Improve R knee flexion ROM to 120 degrees, and extension to neutral.  -AC     STG 3 Progress  Met  -AC     STG 4  Enable ability to walk without requiring use of crutch.  -AC     STG 4 Progress  Met  -AC        Long Term Goals    LTG Date to Achieve  19  -AC     LTG 1  Decrease R knee pain frequency/intensity by > or = 75%.  -AC     LTG 1 Progress  Not Met  -AC     LTG 2  Improve LEFS score to > or = 50/80 to reflect significant improvement in use of RLE for ADLs/functional mobility.  -AC     LTG 2 Progress  Met 65/80  -AC     LTG 3  Enable pain-free squatting.  -AC     LTG 3 Progress  Partially Met Minimal-no pain with partial squats  -AC        Time Calculation    PT Goal Re-Cert Due Date  19  -AC       User Key  (r) = Recorded By, (t) = Taken By, (c) = Cosigned By    Initials Name Provider Type    Toshia Guadalupe, PT Physical Therapist        OP PT Discharge Summary  Date of Discharge: 19  Reason for Discharge: Maximum functional potential achieved  Outcomes Achieved: Patient able to partially acheive established goals  Discharge Destination: Home with home program  Discharge Instructions/Additional Comments: Pt was seen for one month s/p meniscal repair for six visits.  Pt did very well over the course of treatment,  reporting minimal-no pain with activity at last treatment session.  Pt did not appear for further follow-up beyond her FU appt with surgeon.  Will be d/c at this time.       Time Calculation:   Start Time: 1420      Toshia Segura, PT  5/1/2019

## 2019-05-20 ENCOUNTER — TELEPHONE (OUTPATIENT)
Dept: OBSTETRICS AND GYNECOLOGY | Facility: CLINIC | Age: 49
End: 2019-05-20

## 2019-05-20 DIAGNOSIS — N92.1 METRORRHAGIA: Primary | ICD-10-CM

## 2019-05-20 RX ORDER — MEDROXYPROGESTERONE ACETATE 10 MG/1
10 TABLET ORAL DAILY
Qty: 10 TABLET | Refills: 0 | Status: SHIPPED | OUTPATIENT
Start: 2019-05-20 | End: 2019-05-20

## 2019-05-20 RX ORDER — MEDROXYPROGESTERONE ACETATE 10 MG/1
10 TABLET ORAL DAILY
Qty: 10 TABLET | Refills: 0 | Status: ON HOLD | OUTPATIENT
Start: 2019-05-20 | End: 2019-05-30

## 2019-05-20 NOTE — TELEPHONE ENCOUNTER
A prescription for Provera has been sent to her pharmacy.  She should take this for 10 consecutive days.  This will probably slow the bleeding while taking it.  After discontinuing her last pill, it would be expected for her to have a heavy flow coming within the next week or so after finishing medication.  It is anticipated that this bleed should spontaneously stop.  I would like to get her in for an ultrasound following that next menstrual bleeding.  We need to do this to make sure that there is not a problem leading to her bleeding.  Most likely relates only to her age.  At 48 years of age, it is recommended to look into this further.  Order for the U/S has been placed    New Medications Ordered This Visit   Medications   • medroxyPROGESTERone (PROVERA) 10 MG tablet     Sig: Take 1 tablet by mouth Daily for 10 days.     Dispense:  10 tablet     Refill:  0

## 2019-05-20 NOTE — TELEPHONE ENCOUNTER
Dr West    Pt calling since she has been on her period for 14 days with heavy blood clots. Per pt she is anemic and would like to know if should be seen or can call something in.    Pt call back [phone] Work after 4:00 her cell [phone]    Quan Rudolph [phone]

## 2019-05-20 NOTE — TELEPHONE ENCOUNTER
Pt informed and stated understanding. Pt asked if we could send the rx to the pharmacy here at [facility] since she is on campus right now. I sent it there.

## 2019-05-24 ENCOUNTER — TELEPHONE (OUTPATIENT)
Dept: OBSTETRICS AND GYNECOLOGY | Facility: CLINIC | Age: 49
End: 2019-05-24

## 2019-05-28 ENCOUNTER — TELEPHONE (OUTPATIENT)
Dept: FAMILY MEDICINE CLINIC | Facility: CLINIC | Age: 49
End: 2019-05-28

## 2019-05-28 DIAGNOSIS — D50.9 IRON DEFICIENCY ANEMIA, UNSPECIFIED IRON DEFICIENCY ANEMIA TYPE: Primary | ICD-10-CM

## 2019-05-28 NOTE — TELEPHONE ENCOUNTER
Pt would like lab to examine iron levels , pt states that she is anemic and would like this to be tested

## 2019-05-29 ENCOUNTER — HOSPITAL ENCOUNTER (OUTPATIENT)
Facility: HOSPITAL | Age: 49
Setting detail: OBSERVATION
Discharge: HOME | End: 2019-05-31
Attending: EMERGENCY MEDICINE | Admitting: OBSTETRICS & GYNECOLOGY

## 2019-05-29 ENCOUNTER — TELEPHONE (OUTPATIENT)
Dept: FAMILY MEDICINE CLINIC | Facility: CLINIC | Age: 49
End: 2019-05-29

## 2019-05-29 ENCOUNTER — LAB (OUTPATIENT)
Dept: LAB | Facility: HOSPITAL | Age: 49
End: 2019-05-29

## 2019-05-29 ENCOUNTER — APPOINTMENT (OUTPATIENT)
Dept: GENERAL RADIOLOGY | Facility: HOSPITAL | Age: 49
End: 2019-05-29

## 2019-05-29 DIAGNOSIS — N93.9 VAGINAL BLEEDING: Primary | ICD-10-CM

## 2019-05-29 DIAGNOSIS — D62 ACUTE BLOOD LOSS ANEMIA: ICD-10-CM

## 2019-05-29 DIAGNOSIS — D50.9 IRON DEFICIENCY ANEMIA, UNSPECIFIED IRON DEFICIENCY ANEMIA TYPE: ICD-10-CM

## 2019-05-29 LAB
ABO GROUP BLD: NORMAL
ALBUMIN SERPL-MCNC: 3.9 G/DL (ref 3.5–5.2)
ALBUMIN/GLOB SERPL: 1.3 G/DL
ALP SERPL-CCNC: 45 U/L (ref 39–117)
ALT SERPL W P-5'-P-CCNC: 11 U/L (ref 1–33)
ANION GAP SERPL CALCULATED.3IONS-SCNC: 11 MMOL/L
AST SERPL-CCNC: 15 U/L (ref 1–32)
B-HCG UR QL: NEGATIVE
BASOPHILS # BLD AUTO: 0.06 10*3/MM3 (ref 0–0.2)
BASOPHILS # BLD AUTO: 0.09 10*3/MM3 (ref 0–0.2)
BASOPHILS NFR BLD AUTO: 1 % (ref 0–1.5)
BASOPHILS NFR BLD AUTO: 1.2 % (ref 0–1.5)
BILIRUB SERPL-MCNC: 0.2 MG/DL (ref 0.2–1.2)
BILIRUB UR QL STRIP: NEGATIVE
BLD GP AB SCN SERPL QL: NEGATIVE
BUN BLD-MCNC: 15 MG/DL (ref 6–20)
BUN/CREAT SERPL: 26.3 (ref 7–25)
CALCIUM SPEC-SCNC: 8.9 MG/DL (ref 8.6–10.5)
CHLORIDE SERPL-SCNC: 107 MMOL/L (ref 98–107)
CLARITY UR: CLEAR
CO2 SERPL-SCNC: 25 MMOL/L (ref 22–29)
COLOR UR: YELLOW
CREAT BLD-MCNC: 0.57 MG/DL (ref 0.57–1)
DEPRECATED RDW RBC AUTO: 47.4 FL (ref 37–54)
DEPRECATED RDW RBC AUTO: 52.3 FL (ref 37–54)
EOSINOPHIL # BLD AUTO: 0.09 10*3/MM3 (ref 0–0.4)
EOSINOPHIL # BLD AUTO: 0.13 10*3/MM3 (ref 0–0.4)
EOSINOPHIL NFR BLD AUTO: 1.5 % (ref 0.3–6.2)
EOSINOPHIL NFR BLD AUTO: 1.7 % (ref 0.3–6.2)
ERYTHROCYTE [DISTWIDTH] IN BLOOD BY AUTOMATED COUNT: 17.3 % (ref 12.3–15.4)
ERYTHROCYTE [DISTWIDTH] IN BLOOD BY AUTOMATED COUNT: 18.5 % (ref 12.3–15.4)
FERRITIN SERPL-MCNC: 7.1 NG/ML (ref 13–150)
GFR SERPL CREATININE-BSD FRML MDRD: 138 ML/MIN/1.73
GLOBULIN UR ELPH-MCNC: 3 GM/DL
GLUCOSE BLD-MCNC: 92 MG/DL (ref 65–99)
GLUCOSE UR STRIP-MCNC: NEGATIVE MG/DL
HCT VFR BLD AUTO: 17.3 % (ref 34–46.6)
HCT VFR BLD AUTO: 19.1 % (ref 34–46.6)
HGB BLD-MCNC: 5.3 G/DL (ref 12–15.9)
HGB BLD-MCNC: 5.5 G/DL (ref 12–15.9)
HGB UR QL STRIP.AUTO: NEGATIVE
HOLD SPECIMEN: NORMAL
HOLD SPECIMEN: NORMAL
IMM GRANULOCYTES # BLD AUTO: 0.01 10*3/MM3 (ref 0–0.05)
IMM GRANULOCYTES # BLD AUTO: 0.02 10*3/MM3 (ref 0–0.05)
IMM GRANULOCYTES NFR BLD AUTO: 0.1 % (ref 0–0.5)
IMM GRANULOCYTES NFR BLD AUTO: 0.3 % (ref 0–0.5)
INTERNAL NEGATIVE CONTROL: NEGATIVE
INTERNAL POSITIVE CONTROL: POSITIVE
IRON 24H UR-MRATE: 13 MCG/DL (ref 37–145)
KETONES UR QL STRIP: NEGATIVE
LEUKOCYTE ESTERASE UR QL STRIP.AUTO: NEGATIVE
LYMPHOCYTES # BLD AUTO: 2.11 10*3/MM3 (ref 0.7–3.1)
LYMPHOCYTES # BLD AUTO: 3.07 10*3/MM3 (ref 0.7–3.1)
LYMPHOCYTES NFR BLD AUTO: 35.6 % (ref 19.6–45.3)
LYMPHOCYTES NFR BLD AUTO: 39.8 % (ref 19.6–45.3)
Lab: NORMAL
MAGNESIUM SERPL-MCNC: 2 MG/DL (ref 1.6–2.6)
MCH RBC QN AUTO: 22.7 PG (ref 26.6–33)
MCH RBC QN AUTO: 23 PG (ref 26.6–33)
MCHC RBC AUTO-ENTMCNC: 28.8 G/DL (ref 31.5–35.7)
MCHC RBC AUTO-ENTMCNC: 30.6 G/DL (ref 31.5–35.7)
MCV RBC AUTO: 75.2 FL (ref 79–97)
MCV RBC AUTO: 78.9 FL (ref 79–97)
MONOCYTES # BLD AUTO: 0.52 10*3/MM3 (ref 0.1–0.9)
MONOCYTES # BLD AUTO: 0.57 10*3/MM3 (ref 0.1–0.9)
MONOCYTES NFR BLD AUTO: 7.4 % (ref 5–12)
MONOCYTES NFR BLD AUTO: 8.8 % (ref 5–12)
NEUTROPHILS # BLD AUTO: 3.12 10*3/MM3 (ref 1.7–7)
NEUTROPHILS # BLD AUTO: 3.86 10*3/MM3 (ref 1.7–7)
NEUTROPHILS NFR BLD AUTO: 49.9 % (ref 42.7–76)
NEUTROPHILS NFR BLD AUTO: 52.8 % (ref 42.7–76)
NITRITE UR QL STRIP: NEGATIVE
NRBC BLD AUTO-RTO: 0 /100 WBC (ref 0–0.2)
PH UR STRIP.AUTO: 5.5 [PH] (ref 5–8)
PLATELET # BLD AUTO: 544 10*3/MM3 (ref 140–450)
PLATELET # BLD AUTO: 552 10*3/MM3 (ref 140–450)
PMV BLD AUTO: 10.4 FL (ref 6–12)
PMV BLD AUTO: 9.3 FL (ref 6–12)
POTASSIUM BLD-SCNC: 4.3 MMOL/L (ref 3.5–5.2)
PROT SERPL-MCNC: 6.9 G/DL (ref 6–8.5)
PROT UR QL STRIP: NEGATIVE
RBC # BLD AUTO: 2.3 10*6/MM3 (ref 3.77–5.28)
RBC # BLD AUTO: 2.42 10*6/MM3 (ref 3.77–5.28)
RH BLD: POSITIVE
SODIUM BLD-SCNC: 143 MMOL/L (ref 136–145)
SP GR UR STRIP: 1.02 (ref 1–1.03)
T&S EXPIRATION DATE: NORMAL
TROPONIN T SERPL-MCNC: <0.01 NG/ML (ref 0–0.03)
UROBILINOGEN UR QL STRIP: NORMAL
WBC NRBC COR # BLD: 5.92 10*3/MM3 (ref 3.4–10.8)
WBC NRBC COR # BLD: 7.72 10*3/MM3 (ref 3.4–10.8)
WHOLE BLOOD HOLD SPECIMEN: NORMAL
WHOLE BLOOD HOLD SPECIMEN: NORMAL

## 2019-05-29 PROCEDURE — 81003 URINALYSIS AUTO W/O SCOPE: CPT | Performed by: EMERGENCY MEDICINE

## 2019-05-29 PROCEDURE — 83540 ASSAY OF IRON: CPT

## 2019-05-29 PROCEDURE — 83735 ASSAY OF MAGNESIUM: CPT | Performed by: EMERGENCY MEDICINE

## 2019-05-29 PROCEDURE — 93005 ELECTROCARDIOGRAM TRACING: CPT | Performed by: EMERGENCY MEDICINE

## 2019-05-29 PROCEDURE — 81025 URINE PREGNANCY TEST: CPT | Performed by: EMERGENCY MEDICINE

## 2019-05-29 PROCEDURE — 84484 ASSAY OF TROPONIN QUANT: CPT | Performed by: EMERGENCY MEDICINE

## 2019-05-29 PROCEDURE — 85025 COMPLETE CBC W/AUTO DIFF WBC: CPT

## 2019-05-29 PROCEDURE — 71045 X-RAY EXAM CHEST 1 VIEW: CPT

## 2019-05-29 PROCEDURE — 86900 BLOOD TYPING SEROLOGIC ABO: CPT | Performed by: EMERGENCY MEDICINE

## 2019-05-29 PROCEDURE — 85025 COMPLETE CBC W/AUTO DIFF WBC: CPT | Performed by: EMERGENCY MEDICINE

## 2019-05-29 PROCEDURE — 86850 RBC ANTIBODY SCREEN: CPT | Performed by: EMERGENCY MEDICINE

## 2019-05-29 PROCEDURE — 82728 ASSAY OF FERRITIN: CPT

## 2019-05-29 PROCEDURE — 86923 COMPATIBILITY TEST ELECTRIC: CPT

## 2019-05-29 PROCEDURE — 80053 COMPREHEN METABOLIC PANEL: CPT | Performed by: EMERGENCY MEDICINE

## 2019-05-29 PROCEDURE — 99284 EMERGENCY DEPT VISIT MOD MDM: CPT

## 2019-05-29 PROCEDURE — 86901 BLOOD TYPING SEROLOGIC RH(D): CPT | Performed by: EMERGENCY MEDICINE

## 2019-05-29 RX ORDER — SODIUM CHLORIDE 0.9 % (FLUSH) 0.9 %
10 SYRINGE (ML) INJECTION AS NEEDED
Status: DISCONTINUED | OUTPATIENT
Start: 2019-05-29 | End: 2019-05-31 | Stop reason: HOSPADM

## 2019-05-29 NOTE — TELEPHONE ENCOUNTER
Called for critical lab value of Hb 5.5. Recommend going to ER for further evaluation. Pt agreeable and understanding. Fatigue and lightheaded recently along with gynecologic bleeding. History of iron deficiency anemia.

## 2019-05-29 NOTE — TELEPHONE ENCOUNTER
She was anemic and her iron was low per labs labs requested by bariatric surgery in March. It looks like it was recommended she take an otc iron supplement twice daily at that time. I will place orders to have her iron and anemia rechecked. Has she been on the iron supplement?

## 2019-05-29 NOTE — TELEPHONE ENCOUNTER
Spoke with pt, she stated that she has been taking the iron supplement once daily but stated she has been seeing her OB for the last 4 weeks due to her cycle being ongoing. She stated they gave her medication to stop her cycle but it has not helped and she thinks this is what is causing all of her symptoms and the drop in her iron. Pt stated she would stop by the lab at the hospital to have labs drawn as that is the closest to her. She had no further questions.

## 2019-05-30 PROBLEM — D64.9 ANEMIA: Status: ACTIVE | Noted: 2019-05-30

## 2019-05-30 PROBLEM — D25.1 INTRAMURAL UTERINE FIBROID: Status: ACTIVE | Noted: 2019-05-30

## 2019-05-30 PROBLEM — D50.0 ANEMIA DUE TO CHRONIC BLOOD LOSS: Status: ACTIVE | Noted: 2019-05-30

## 2019-05-30 PROBLEM — R23.2 HOT FLASHES: Status: RESOLVED | Noted: 2018-07-08 | Resolved: 2019-05-30

## 2019-05-30 LAB
DEPRECATED RDW RBC AUTO: 53.6 FL (ref 37–54)
ERYTHROCYTE [DISTWIDTH] IN BLOOD BY AUTOMATED COUNT: 18.6 % (ref 12.3–15.4)
HCT VFR BLD AUTO: 24.7 % (ref 34–46.6)
HGB BLD-MCNC: 7.8 G/DL (ref 12–15.9)
MCH RBC QN AUTO: 25.1 PG (ref 26.6–33)
MCHC RBC AUTO-ENTMCNC: 31.6 G/DL (ref 31.5–35.7)
MCV RBC AUTO: 79.4 FL (ref 79–97)
PLATELET # BLD AUTO: 458 10*3/MM3 (ref 140–450)
PMV BLD AUTO: 10 FL (ref 6–12)
RBC # BLD AUTO: 3.11 10*6/MM3 (ref 3.77–5.28)
WBC NRBC COR # BLD: 7.63 10*3/MM3 (ref 3.4–10.8)

## 2019-05-30 PROCEDURE — 99234 HOSP IP/OBS SM DT SF/LOW 45: CPT | Performed by: OBSTETRICS & GYNECOLOGY

## 2019-05-30 PROCEDURE — 36430 TRANSFUSION BLD/BLD COMPNT: CPT

## 2019-05-30 PROCEDURE — P9016 RBC LEUKOCYTES REDUCED: HCPCS

## 2019-05-30 PROCEDURE — 86900 BLOOD TYPING SEROLOGIC ABO: CPT

## 2019-05-30 PROCEDURE — G0378 HOSPITAL OBSERVATION PER HR: HCPCS

## 2019-05-30 PROCEDURE — 85027 COMPLETE CBC AUTOMATED: CPT | Performed by: OBSTETRICS & GYNECOLOGY

## 2019-05-30 RX ORDER — SODIUM CHLORIDE 0.9 % (FLUSH) 0.9 %
3-10 SYRINGE (ML) INJECTION AS NEEDED
Status: DISCONTINUED | OUTPATIENT
Start: 2019-05-30 | End: 2019-05-31 | Stop reason: HOSPADM

## 2019-05-30 RX ORDER — ACETAMINOPHEN 325 MG/1
650 TABLET ORAL EVERY 4 HOURS PRN
Status: DISCONTINUED | OUTPATIENT
Start: 2019-05-30 | End: 2019-05-31 | Stop reason: HOSPADM

## 2019-05-30 RX ORDER — FERROUS SULFATE 325(65) MG
325 TABLET ORAL
Status: DISCONTINUED | OUTPATIENT
Start: 2019-05-30 | End: 2019-05-31 | Stop reason: HOSPADM

## 2019-05-30 RX ORDER — SODIUM CHLORIDE 0.9 % (FLUSH) 0.9 %
3 SYRINGE (ML) INJECTION EVERY 12 HOURS SCHEDULED
Status: DISCONTINUED | OUTPATIENT
Start: 2019-05-30 | End: 2019-05-31 | Stop reason: HOSPADM

## 2019-05-30 RX ADMIN — ACETAMINOPHEN 650 MG: 325 TABLET ORAL at 14:18

## 2019-05-30 RX ADMIN — NORETHINDRONE ACETATE 5 MG: 5 TABLET ORAL at 15:45

## 2019-05-30 RX ADMIN — SODIUM CHLORIDE, PRESERVATIVE FREE 3 ML: 5 INJECTION INTRAVENOUS at 08:08

## 2019-05-30 RX ADMIN — FERROUS SULFATE TAB 325 MG (65 MG ELEMENTAL FE) 325 MG: 325 (65 FE) TAB at 08:09

## 2019-05-30 RX ADMIN — FERROUS SULFATE TAB 325 MG (65 MG ELEMENTAL FE) 325 MG: 325 (65 FE) TAB at 11:59

## 2019-05-30 RX ADMIN — NORETHINDRONE ACETATE 5 MG: 5 TABLET ORAL at 20:58

## 2019-05-30 RX ADMIN — FERROUS SULFATE TAB 325 MG (65 MG ELEMENTAL FE) 325 MG: 325 (65 FE) TAB at 17:36

## 2019-05-30 RX ADMIN — ACETAMINOPHEN 650 MG: 325 TABLET ORAL at 09:50

## 2019-05-30 RX ADMIN — ACETAMINOPHEN 650 MG: 325 TABLET ORAL at 20:58

## 2019-05-30 RX ADMIN — SODIUM CHLORIDE, PRESERVATIVE FREE 3 ML: 5 INJECTION INTRAVENOUS at 20:58

## 2019-05-31 VITALS
HEART RATE: 73 BPM | BODY MASS INDEX: 32.2 KG/M2 | TEMPERATURE: 97.8 F | HEIGHT: 60 IN | DIASTOLIC BLOOD PRESSURE: 62 MMHG | SYSTOLIC BLOOD PRESSURE: 96 MMHG | OXYGEN SATURATION: 100 % | RESPIRATION RATE: 20 BRPM | WEIGHT: 164 LBS

## 2019-05-31 PROBLEM — R31.29 MICROSCOPIC HEMATURIA: Status: RESOLVED | Noted: 2018-08-10 | Resolved: 2019-05-31

## 2019-05-31 LAB
ABO + RH BLD: NORMAL
BH BB BLOOD EXPIRATION DATE: NORMAL
BH BB BLOOD TYPE BARCODE: 5100
BH BB DISPENSE STATUS: NORMAL
BH BB PRODUCT CODE: NORMAL
BH BB UNIT NUMBER: NORMAL
DEPRECATED RDW RBC AUTO: 52.2 FL (ref 37–54)
ERYTHROCYTE [DISTWIDTH] IN BLOOD BY AUTOMATED COUNT: 18.7 % (ref 12.3–15.4)
HCT VFR BLD AUTO: 27.3 % (ref 34–46.6)
HGB BLD-MCNC: 8.9 G/DL (ref 12–15.9)
MCH RBC QN AUTO: 25 PG (ref 26.6–33)
MCHC RBC AUTO-ENTMCNC: 32.6 G/DL (ref 31.5–35.7)
MCV RBC AUTO: 76.7 FL (ref 79–97)
PLATELET # BLD AUTO: 350 10*3/MM3 (ref 140–450)
PMV BLD AUTO: 9.1 FL (ref 6–12)
RBC # BLD AUTO: 3.56 10*6/MM3 (ref 3.77–5.28)
UNIT  ABO: NORMAL
UNIT  RH: NORMAL
WBC NRBC COR # BLD: 7.27 10*3/MM3 (ref 3.4–10.8)

## 2019-05-31 PROCEDURE — 85027 COMPLETE CBC AUTOMATED: CPT | Performed by: OBSTETRICS & GYNECOLOGY

## 2019-05-31 PROCEDURE — G0378 HOSPITAL OBSERVATION PER HR: HCPCS

## 2019-05-31 RX ORDER — FERROUS SULFATE 325(65) MG
325 TABLET ORAL
Qty: 90 TABLET | Refills: 3 | Status: SHIPPED | OUTPATIENT
Start: 2019-05-31 | End: 2019-08-13 | Stop reason: SDUPTHER

## 2019-05-31 RX ADMIN — FERROUS SULFATE TAB 325 MG (65 MG ELEMENTAL FE) 325 MG: 325 (65 FE) TAB at 08:15

## 2019-05-31 RX ADMIN — NORETHINDRONE ACETATE 5 MG: 5 TABLET ORAL at 08:15

## 2019-06-03 ENCOUNTER — TRANSITIONAL CARE MANAGEMENT TELEPHONE ENCOUNTER (OUTPATIENT)
Dept: FAMILY MEDICINE CLINIC | Facility: CLINIC | Age: 49
End: 2019-06-03

## 2019-06-03 NOTE — OUTREACH NOTE
ARCELIA call completed.  Please refer to TCM call flowsheet for call documentation.    The pt states she is feeling some better. She is working today. She continues with some fatigue and mild SOB however reports sx's have continued to improve. Eating and drinking well. Has started post-dc meds. Vaginal bleeding resolved. She denies any fever, chills, dizziness, or lightheadedness. Pt denies any questions, concerns, or needs at time of call.

## 2019-06-07 ENCOUNTER — LAB (OUTPATIENT)
Dept: LAB | Facility: HOSPITAL | Age: 49
End: 2019-06-07

## 2019-06-07 ENCOUNTER — OFFICE VISIT (OUTPATIENT)
Dept: OBSTETRICS AND GYNECOLOGY | Facility: CLINIC | Age: 49
End: 2019-06-07

## 2019-06-07 ENCOUNTER — OFFICE VISIT (OUTPATIENT)
Dept: FAMILY MEDICINE CLINIC | Facility: CLINIC | Age: 49
End: 2019-06-07

## 2019-06-07 VITALS
BODY MASS INDEX: 32.2 KG/M2 | OXYGEN SATURATION: 98 % | HEART RATE: 92 BPM | SYSTOLIC BLOOD PRESSURE: 106 MMHG | WEIGHT: 164 LBS | DIASTOLIC BLOOD PRESSURE: 60 MMHG | HEIGHT: 60 IN

## 2019-06-07 VITALS — WEIGHT: 164 LBS | BODY MASS INDEX: 32.03 KG/M2 | RESPIRATION RATE: 14 BRPM

## 2019-06-07 DIAGNOSIS — D50.9 IRON DEFICIENCY ANEMIA, UNSPECIFIED IRON DEFICIENCY ANEMIA TYPE: Primary | ICD-10-CM

## 2019-06-07 DIAGNOSIS — D50.0 ANEMIA DUE TO CHRONIC BLOOD LOSS: ICD-10-CM

## 2019-06-07 DIAGNOSIS — D25.0 INTRAMURAL, SUBMUCOUS, AND SUBSEROUS LEIOMYOMA OF UTERUS: Primary | ICD-10-CM

## 2019-06-07 DIAGNOSIS — D25.2 INTRAMURAL, SUBMUCOUS, AND SUBSEROUS LEIOMYOMA OF UTERUS: Primary | ICD-10-CM

## 2019-06-07 DIAGNOSIS — D25.1 INTRAMURAL, SUBMUCOUS, AND SUBSEROUS LEIOMYOMA OF UTERUS: Primary | ICD-10-CM

## 2019-06-07 DIAGNOSIS — D50.9 IRON DEFICIENCY ANEMIA, UNSPECIFIED IRON DEFICIENCY ANEMIA TYPE: ICD-10-CM

## 2019-06-07 DIAGNOSIS — D25.9 UTERINE LEIOMYOMA, UNSPECIFIED LOCATION: ICD-10-CM

## 2019-06-07 DIAGNOSIS — N92.1 METRORRHAGIA: ICD-10-CM

## 2019-06-07 LAB
BASOPHILS # BLD AUTO: 0.1 10*3/MM3 (ref 0–0.2)
BASOPHILS NFR BLD AUTO: 1.1 % (ref 0–1.5)
DEPRECATED RDW RBC AUTO: 69.1 FL (ref 37–54)
EOSINOPHIL # BLD AUTO: 0.11 10*3/MM3 (ref 0–0.4)
EOSINOPHIL NFR BLD AUTO: 1.2 % (ref 0.3–6.2)
ERYTHROCYTE [DISTWIDTH] IN BLOOD BY AUTOMATED COUNT: 22.8 % (ref 12.3–15.4)
HCT VFR BLD AUTO: 35.6 % (ref 34–46.6)
HGB BLD-MCNC: 10.6 G/DL (ref 12–15.9)
IMM GRANULOCYTES # BLD AUTO: 0.04 10*3/MM3 (ref 0–0.05)
IMM GRANULOCYTES NFR BLD AUTO: 0.4 % (ref 0–0.5)
LYMPHOCYTES # BLD AUTO: 1.81 10*3/MM3 (ref 0.7–3.1)
LYMPHOCYTES NFR BLD AUTO: 19.1 % (ref 19.6–45.3)
MCH RBC QN AUTO: 25.2 PG (ref 26.6–33)
MCHC RBC AUTO-ENTMCNC: 29.8 G/DL (ref 31.5–35.7)
MCV RBC AUTO: 84.6 FL (ref 79–97)
MONOCYTES # BLD AUTO: 0.57 10*3/MM3 (ref 0.1–0.9)
MONOCYTES NFR BLD AUTO: 6 % (ref 5–12)
NEUTROPHILS # BLD AUTO: 6.84 10*3/MM3 (ref 1.7–7)
NEUTROPHILS NFR BLD AUTO: 72.2 % (ref 42.7–76)
NRBC BLD AUTO-RTO: 0.1 /100 WBC (ref 0–0.2)
PLATELET # BLD AUTO: 440 10*3/MM3 (ref 140–450)
PMV BLD AUTO: 11.1 FL (ref 6–12)
RBC # BLD AUTO: 4.21 10*6/MM3 (ref 3.77–5.28)
WBC NRBC COR # BLD: 9.47 10*3/MM3 (ref 3.4–10.8)

## 2019-06-07 PROCEDURE — 83540 ASSAY OF IRON: CPT | Performed by: PHYSICIAN ASSISTANT

## 2019-06-07 PROCEDURE — 99495 TRANSJ CARE MGMT MOD F2F 14D: CPT | Performed by: PHYSICIAN ASSISTANT

## 2019-06-07 PROCEDURE — 99213 OFFICE O/P EST LOW 20 MIN: CPT | Performed by: OBSTETRICS & GYNECOLOGY

## 2019-06-07 PROCEDURE — 82728 ASSAY OF FERRITIN: CPT | Performed by: PHYSICIAN ASSISTANT

## 2019-06-07 PROCEDURE — 85025 COMPLETE CBC W/AUTO DIFF WBC: CPT | Performed by: PHYSICIAN ASSISTANT

## 2019-06-07 NOTE — PROGRESS NOTES
Chief Complaint   Patient presents with   • Follow-up     hospital follow up        HPI     Laure Valentin is a pleasant 48 y.o. female who is here for posthospital follow-up. Admitted to [facility] from 5/29-5/31 for symptomatic anemia with a hemoglobin of 5. Had vaginal bleeding for 1 month. Placed on Aygestin by GYN, bleeding resolved on 6/1. She was transfused with 4 units during admission with improved anemia, hemoglobin of 8 at discharge. Her shortness of breath has been improving, not completely resolved. Still has some low energy which has been chronic. Saw her OBGYN today and may have to have surgery for fibroids. She is taking an iron supplement tid. She has a history of bariatric surgery and iron deficiency anemia requiring iron infusions last year.      Past Medical History:   Diagnosis Date   • GERD (gastroesophageal reflux disease)     resolved w/ WLS   • History of kidney stones 2011    most recent 12/24/17   • History of transfusion of 4 units of packed RBC 05/2019   • Intramural uterine fibroid 2018   • Microcytic anemia        Past Surgical History:   Procedure Laterality Date   • CERVICAL CERCLAGE  2001   • COLONOSCOPY  2011   • D&C WITH SUCTION  1997   • GASTRIC SLEEVE LAPAROSCOPIC N/A 1/26/2017    Procedure: GASTRIC SLEEVE LAPAROSCOPY   • KNEE MENISCECTOMY Right 12/2018   • LAPAROSCOPIC TUBAL LIGATION  2002   • [place] LAP,ESOPHAGOGAST FUNDOPLASTY N/A 1/26/2017    Procedure: HIATAL HERNIA REPAIR LAPAROSCOPIC;  Surgeon: Eulalio Dick MD   • WISDOM TOOTH EXTRACTION  1992       Family History   Problem Relation Age of Onset   • Hypertension Mother    • Hyperlipidemia Mother    • Osteoarthritis Mother    • No Known Problems Father    • Breast cancer Neg Hx    • Ovarian cancer Neg Hx        Social History     Socioeconomic History   • Marital status:      Spouse name: Not on file   • Number of children: Not on file   • Years of education: Not on file   • Highest education level: Not on file    Tobacco Use   • Smoking status: Never Smoker   • Smokeless tobacco: Never Used   Substance and Sexual Activity   • Alcohol use: No   • Drug use: No   • Sexual activity: Defer       No Known Allergies    ROS    Review of Systems   Constitutional: Positive for fatigue. Negative for chills and fever.   HENT: Negative for congestion.    Respiratory: Positive for shortness of breath.    Cardiovascular: Negative for chest pain.   Gastrointestinal: Negative for abdominal pain, blood in stool, diarrhea and vomiting.   Neurological: Negative for dizziness and light-headedness.       Vitals:    06/07/19 1615   BP: 106/60   Pulse: 92   SpO2: 98%         Current Outpatient Medications:   •  ferrous sulfate 325 (65 FE) MG tablet, Take 1 tablet by mouth 3 (Three) Times a Day With Meals., Disp: 90 tablet, Rfl: 3  •  ibuprofen (ADVIL,MOTRIN) 400 MG tablet, Take 400 mg by mouth As Needed for Mild Pain ., Disp: , Rfl:   •  Multiple Vitamins-Minerals (MULTIVITAMIN ADULT PO), Take 1 tablet by mouth Daily., Disp: , Rfl:   •  norethindrone (AYGESTIN) 5 MG tablet, Take 1 tablet by mouth Every 12 (Twelve) Hours for 39 doses., Disp: 30 tablet, Rfl: 2    PE    Physical Exam   Constitutional: She appears well-developed and well-nourished. No distress.   HENT:   Head: Normocephalic.   Cardiovascular: Normal rate, regular rhythm and normal heart sounds.   No murmur heard.  Pulmonary/Chest: Effort normal and breath sounds normal.   Neurological: She is alert.   Psychiatric: She has a normal mood and affect. Her behavior is normal.   Vitals reviewed.      Results    Results for orders placed or performed during the hospital encounter of 05/29/19   Comprehensive Metabolic Panel   Result Value Ref Range    Glucose 92 65 - 99 mg/dL    BUN 15 6 - 20 mg/dL    Creatinine 0.57 0.57 - 1.00 mg/dL    Sodium 143 136 - 145 mmol/L    Potassium 4.3 3.5 - 5.2 mmol/L    Chloride 107 98 - 107 mmol/L    CO2 25.0 22.0 - 29.0 mmol/L    Calcium 8.9 8.6 - 10.5 mg/dL     Total Protein 6.9 6.0 - 8.5 g/dL    Albumin 3.90 3.50 - 5.20 g/dL    ALT (SGPT) 11 1 - 33 U/L    AST (SGOT) 15 1 - 32 U/L    Alkaline Phosphatase 45 39 - 117 U/L    Total Bilirubin 0.2 0.2 - 1.2 mg/dL    eGFR  African Amer 138 >60 mL/min/1.73    Globulin 3.0 gm/dL    A/G Ratio 1.3 g/dL    BUN/Creatinine Ratio 26.3 (H) 7.0 - 25.0    Anion Gap 11.0 mmol/L   Troponin   Result Value Ref Range    Troponin T <0.010 0.000 - 0.030 ng/mL   Magnesium   Result Value Ref Range    Magnesium 2.0 1.6 - 2.6 mg/dL   Urinalysis With Microscopic If Indicated (No Culture) - Urine, Clean Catch   Result Value Ref Range    Color, UA Yellow Yellow, Straw    Appearance, UA Clear Clear    pH, UA 5.5 5.0 - 8.0    Specific Gravity, UA 1.021 1.001 - 1.030    Glucose, UA Negative Negative    Ketones, UA Negative Negative    Bilirubin, UA Negative Negative    Blood, UA Negative Negative    Protein, UA Negative Negative    Leuk Esterase, UA Negative Negative    Nitrite, UA Negative Negative    Urobilinogen, UA 0.2 E.U./dL 0.2 - 1.0 E.U./dL   CBC Auto Differential   Result Value Ref Range    WBC 7.72 3.40 - 10.80 10*3/mm3    RBC 2.30 (L) 3.77 - 5.28 10*6/mm3    Hemoglobin 5.3 (C) 12.0 - 15.9 g/dL    Hematocrit 17.3 (C) 34.0 - 46.6 %    MCV 75.2 (L) 79.0 - 97.0 fL    MCH 23.0 (L) 26.6 - 33.0 pg    MCHC 30.6 (L) 31.5 - 35.7 g/dL    RDW 17.3 (H) 12.3 - 15.4 %    RDW-SD 47.4 37.0 - 54.0 fl    MPV 9.3 6.0 - 12.0 fL    Platelets 552 (H) 140 - 450 10*3/mm3    Neutrophil % 49.9 42.7 - 76.0 %    Lymphocyte % 39.8 19.6 - 45.3 %    Monocyte % 7.4 5.0 - 12.0 %    Eosinophil % 1.7 0.3 - 6.2 %    Basophil % 1.2 0.0 - 1.5 %    Immature Grans % 0.1 0.0 - 0.5 %    Neutrophils, Absolute 3.86 1.70 - 7.00 10*3/mm3    Lymphocytes, Absolute 3.07 0.70 - 3.10 10*3/mm3    Monocytes, Absolute 0.57 0.10 - 0.90 10*3/mm3    Eosinophils, Absolute 0.13 0.00 - 0.40 10*3/mm3    Basophils, Absolute 0.09 0.00 - 0.20 10*3/mm3    Immature Grans, Absolute 0.01 0.00 - 0.05 10*3/mm3    CBC (No Diff)   Result Value Ref Range    WBC 7.63 3.40 - 10.80 10*3/mm3    RBC 3.11 (L) 3.77 - 5.28 10*6/mm3    Hemoglobin 7.8 (L) 12.0 - 15.9 g/dL    Hematocrit 24.7 (L) 34.0 - 46.6 %    MCV 79.4 79.0 - 97.0 fL    MCH 25.1 (L) 26.6 - 33.0 pg    MCHC 31.6 31.5 - 35.7 g/dL    RDW 18.6 (H) 12.3 - 15.4 %    RDW-SD 53.6 37.0 - 54.0 fl    MPV 10.0 6.0 - 12.0 fL    Platelets 458 (H) 140 - 450 10*3/mm3   CBC (No Diff)   Result Value Ref Range    WBC 7.27 3.40 - 10.80 10*3/mm3    RBC 3.56 (L) 3.77 - 5.28 10*6/mm3    Hemoglobin 8.9 (L) 12.0 - 15.9 g/dL    Hematocrit 27.3 (L) 34.0 - 46.6 %    MCV 76.7 (L) 79.0 - 97.0 fL    MCH 25.0 (L) 26.6 - 33.0 pg    MCHC 32.6 31.5 - 35.7 g/dL    RDW 18.7 (H) 12.3 - 15.4 %    RDW-SD 52.2 37.0 - 54.0 fl    MPV 9.1 6.0 - 12.0 fL    Platelets 350 140 - 450 10*3/mm3   POCT pregnancy, urine   Result Value Ref Range    HCG, Urine, QL Negative Negative    Lot Number [id number]     Internal Positive Control Positive     Internal Negative Control Negative    Type & Screen   Result Value Ref Range    ABO Type O     RH type Positive     Antibody Screen Negative     T&S Expiration Date 6/1/2019 11:59:59 PM    Prepare RBC, 2 Units   Result Value Ref Range    Product Code I6917M10     Unit Number [id number]     UNIT  ABO O     UNIT  RH POS     Dispense Status PT     Blood Type OPOS     Blood Expiration Date 645490454000     Blood Type Barcode 5100     Product Code Y0435J43     Unit Number [id number]     UNIT  ABO O     UNIT  RH POS     Dispense Status PT     Blood Type OPOS     Blood Expiration Date 201906242359     Blood Type Barcode 5100    Prepare RBC, 2 Units   Result Value Ref Range    Product Code G9580A08     Unit Number [id number]     UNIT  ABO O     UNIT  RH POS     Dispense Status PT     Blood Type OS     Blood Expiration Date 201906242359     Blood Type Barcode 5100     Product Code X9104F23     Unit Number [id number]     UNIT  ABO O     UNIT  RH POS     Dispense  Status PT     Blood Type OPOS     Blood Expiration Date 062580390022     Blood Type Barcode 5100    Light Blue Top   Result Value Ref Range    Extra Tube hold for add-on    Green Top (Gel)   Result Value Ref Range    Extra Tube Hold for add-ons.    Lavender Top   Result Value Ref Range    Extra Tube hold for add-on    Gold Top - SST   Result Value Ref Range    Extra Tube Hold for add-ons.        A/P    Problem List Items Addressed This Visit        Hematopoietic and Hemostatic    Iron deficiency anemia - Primary    Overview     -Gastric sleeve 1/2018 but patient states anemia predates her bariatric surgery   -Acutely worsened with hx 1 month vaginal bleeding, now resolved  -Improved symptoms s/p transfusions, monitor cbc and iron studies   -Continue oral iron tid, consider further iron infusions if needed        Relevant Orders    CBC & Differential    Iron    Ferritin      Other Visit Diagnoses     Uterine leiomyoma, unspecified location      -Following with GYN, see HPI      Metrorrhagia      -Following with GYN, see HPI          Plan of care reviewed with patient at the conclusion of today's visit. Education was provided regarding diagnosis, management and any prescribed or recommended OTC medications.  Patient verbalizes understanding of and agreement with management plan.        JAMES Guerrero

## 2019-06-07 NOTE — PROGRESS NOTES
Subjective   Chief Complaint   Patient presents with   • Imaging Only     Laure Valentin is a 48 y.o. year old .  Patient's last menstrual period was 2019.  She presents to be seen because of follow-up from the hospital.  She was recently admitted due to anemia.  She was transfused a total of 4 units of blood.  She was discharged on Aygestin.  She comes today to follow-up to talk about long-term management plans.  On the Aygestin twice daily bleeding is essentially stopped.  She does continue to take iron.    The following portions of the patient's history were reviewed and updated as appropriate:current medications and allergies    Social History    Tobacco Use      Smoking status: Never Smoker      Smokeless tobacco: Never Used         Objective   Resp 14   Wt 74.4 kg (164 lb)   LMP 2019   Breastfeeding? No   BMI 32.03 kg/m²     Lab Review   No data reviewed    Imaging   Pelvic ultrasound images independantly reviewed - Multiple fibroids are present.  At least one appears to be at a minimum of a type I submucosal fibroid.        Assessment   1. History of metrorrhagia with anemia status post transfusion.  Ultrasound today consistent with multiple fibroids none of which are big.  One appears to be at least a type I submucosal fibroid     Plan   1. Set up saline infusion sonogram to determine if the intracavitary fibroid is resectable before determining final treatment plans  2. The importance of keeping all planned follow-up and taking all medications as prescribed was emphasized.  3. Follow up after ultrasound          This note was electronically signed.    Maged Marcelo M.D.  2019    Total time spent today with Laure  was 20 minutes (level 3).  Off this time, 90% was spent face-to-face time coordinating care, answering her questions and counseling regarding pathophysiology of her presenting problem along with plans for any diagnositc work-up and treatment.    Note: Speech  recognition transcription software may have been used to create portions of this document.  An attempt at proofreading has been made but errors in transcription could still be present.

## 2019-06-08 LAB
FERRITIN SERPL-MCNC: 27.1 NG/ML (ref 13–150)
IRON 24H UR-MRATE: 48 MCG/DL (ref 37–145)

## 2019-06-14 ENCOUNTER — OFFICE VISIT (OUTPATIENT)
Dept: OBSTETRICS AND GYNECOLOGY | Facility: CLINIC | Age: 49
End: 2019-06-14

## 2019-06-14 DIAGNOSIS — D25.2 INTRAMURAL, SUBMUCOUS, AND SUBSEROUS LEIOMYOMA OF UTERUS: Primary | ICD-10-CM

## 2019-06-14 DIAGNOSIS — N92.1 METRORRHAGIA: ICD-10-CM

## 2019-06-14 DIAGNOSIS — D25.0 INTRAMURAL, SUBMUCOUS, AND SUBSEROUS LEIOMYOMA OF UTERUS: Primary | ICD-10-CM

## 2019-06-14 DIAGNOSIS — D25.1 INTRAMURAL, SUBMUCOUS, AND SUBSEROUS LEIOMYOMA OF UTERUS: Primary | ICD-10-CM

## 2019-06-14 DIAGNOSIS — D50.0 ANEMIA DUE TO CHRONIC BLOOD LOSS: ICD-10-CM

## 2019-06-14 PROCEDURE — 58340 CATHETER FOR HYSTEROGRAPHY: CPT | Performed by: OBSTETRICS & GYNECOLOGY

## 2019-06-14 NOTE — PROGRESS NOTES
Saline Infusion Sonogram     Date of procedure:  June 14, 2019     Risks and benefits were discussed.  All questions were answered.  Consents given by the patient verbally.     Pre-op indication:  1. Metrorrhagia with fibroids     Procedure documentation:    After the patient was identified and informed consent given she was placed in dorsal lithotomy position in stirrups and draped. A sterile speculum was placed inside the vagina with good visualization of the cervix and the cervix was cleaned with Betadine swabs.  The cervix was grasped with a single-tooth tenaculum.  A balloon catheter was introduced through the cervix without complication and inflated. The speculum was removed. The uterine cavity was then evaluated with transvaginal ultrasonography while saline was being instilled.    The findings were as follows:  · The bilayer endometrial stripe measured < 2.1 mm.  · Focal lesions were absent.  There is a posterior Type 2 fibroid with < 15% into the cavity    The balloon was then released and the cavity was then drained of saline and the catheter was removed. Scant bleeding was noted from the cervical lip and the procedure was completed. The patient tolerated the procedure well and was given follow-up instructions.      Impression: 1. Normal cavity  2. Type 2 submucosal fibroid with minimal cavitary extension   Plan: 1. No additional workup is needed   2. May benefit from Mirena or OCP's or ablation     This note was electronically signed.    Maged Marcelo M.D.  June 14, 2019

## 2019-06-17 ENCOUNTER — TELEPHONE (OUTPATIENT)
Dept: OBSTETRICS AND GYNECOLOGY | Facility: CLINIC | Age: 49
End: 2019-06-17

## 2019-06-17 NOTE — TELEPHONE ENCOUNTER
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  She is on Aygestin and asking if she should continue that right up until the time of the procedure?    [phone]

## 2019-06-20 NOTE — PROGRESS NOTES
I have reviewed the notes, assessments, and/or procedures performed by Hugo Garcia, I concur with her/his documentation of Laure Valentin.

## 2019-06-26 ENCOUNTER — OFFICE VISIT (OUTPATIENT)
Dept: OBSTETRICS AND GYNECOLOGY | Facility: CLINIC | Age: 49
End: 2019-06-26

## 2019-06-26 VITALS
BODY MASS INDEX: 32.22 KG/M2 | SYSTOLIC BLOOD PRESSURE: 118 MMHG | RESPIRATION RATE: 14 BRPM | DIASTOLIC BLOOD PRESSURE: 72 MMHG | WEIGHT: 165 LBS

## 2019-06-26 DIAGNOSIS — D25.0 INTRAMURAL, SUBMUCOUS, AND SUBSEROUS LEIOMYOMA OF UTERUS: ICD-10-CM

## 2019-06-26 DIAGNOSIS — D50.0 ANEMIA DUE TO CHRONIC BLOOD LOSS: ICD-10-CM

## 2019-06-26 DIAGNOSIS — Z30.014 ENCOUNTER FOR INITIAL PRESCRIPTION OF INTRAUTERINE CONTRACEPTIVE DEVICE (IUD): Primary | ICD-10-CM

## 2019-06-26 DIAGNOSIS — D25.2 INTRAMURAL, SUBMUCOUS, AND SUBSEROUS LEIOMYOMA OF UTERUS: ICD-10-CM

## 2019-06-26 DIAGNOSIS — D25.1 INTRAMURAL, SUBMUCOUS, AND SUBSEROUS LEIOMYOMA OF UTERUS: ICD-10-CM

## 2019-06-26 PROCEDURE — 99212-NC PR NO CHARGE CBC OFFICE OUTPATIENT VISIT 10 MINUTES: Performed by: OBSTETRICS & GYNECOLOGY

## 2019-06-26 PROCEDURE — 58300 INSERT INTRAUTERINE DEVICE: CPT | Performed by: OBSTETRICS & GYNECOLOGY

## 2019-06-26 NOTE — PROGRESS NOTES
IUD Insertion    Patient's last menstrual period was 05/29/2019.    Date of procedure:  6/26/2019    Risks and benefits discussed? yes  All questions answered? yes  Consents given by The patient  Written consent obtained? yes    Local anesthesia used:  no    Procedure documentation:    After verifying the patient had a low probability of being pregnant and met the criteria for insertion, a sterile speculum has placed and the cervix was cleansed with an antiseptic solution.  Vaginal discharge was scant.  The anterior lip of the cervix was grasped with a tenaculum and the uterine cavity was gently sounded. There was mild difficulty passing the sound through the cervix.  Cervical dilation did not need to be performed prior to placing the IUD.  The uterus was midposition and sounded to 10 cms.  The Mirena was then prepared per the manufacturers instructions.    The Mirena was advanced to a point 2 cms from the fundus and then the arms were released from the sheath.  The device was advanced to the fundus and the device was released fully from the sheath. The string was cut 2 cms in length.  Bleeding from the cervix was scant.    She tolerated the procedure without any difficulty.     Post procedure instructions: It was reviewed that the Mirena will not alter the timing of when she bleeds but it may decrease the quantity of flow and cramps.  Roughly 30% of people will be amenorrheic over time.  Efficacy rate of 99.2% over 5 years was discussed.  Spontaneous expulsion rate of 1-2% was also discussed.  If she has any issue with fever or excessive bleeding or pain she is to call the office immediately.  Otherwise I would like to see her back in 6 weeks with an ultrasound to confirm correct placement.    This note was electronically signed.    Maged Marcelo M.D.  June 26, 2019

## 2019-08-07 ENCOUNTER — OFFICE VISIT (OUTPATIENT)
Dept: OBSTETRICS AND GYNECOLOGY | Facility: CLINIC | Age: 49
End: 2019-08-07

## 2019-08-07 VITALS — BODY MASS INDEX: 33.2 KG/M2 | RESPIRATION RATE: 14 BRPM | WEIGHT: 170 LBS

## 2019-08-07 DIAGNOSIS — D25.1 INTRAMURAL, SUBMUCOUS, AND SUBSEROUS LEIOMYOMA OF UTERUS: ICD-10-CM

## 2019-08-07 DIAGNOSIS — Z30.431 CHECKING OF INTRAUTERINE DEVICE: Primary | ICD-10-CM

## 2019-08-07 DIAGNOSIS — D25.2 INTRAMURAL, SUBMUCOUS, AND SUBSEROUS LEIOMYOMA OF UTERUS: ICD-10-CM

## 2019-08-07 DIAGNOSIS — D25.0 INTRAMURAL, SUBMUCOUS, AND SUBSEROUS LEIOMYOMA OF UTERUS: ICD-10-CM

## 2019-08-07 PROCEDURE — 99213 OFFICE O/P EST LOW 20 MIN: CPT | Performed by: OBSTETRICS & GYNECOLOGY

## 2019-08-07 NOTE — PROGRESS NOTES
Subjective   Chief Complaint   Patient presents with   • Imaging Only     Laure Valentin is a 48 y.o. year old  presenting to be seen for follow-up of her recently placed Mirena.  Since the time of insertion she has been sexually active.  Rewey has not been painful for her.   Rewey has not been painful for her partner.  There is been one cycle since placement of the IUD.  It lasted a little longer than usual but was lighter than typically her cycles have been    OTHER THINGS SHE WANTS TO DISCUSS TODAY:  Nothing else       Objective   Resp 14   Wt 77.1 kg (170 lb)   Breastfeeding? No   BMI 33.20 kg/m²     Imaging   Pelvic ultrasound report       Assessment   1. Appropriate placement of endometrial IUD  2. History of iron deficiency anemia related to menstrual blood loss     Plan   1. The importance of keeping all planned follow-up and taking all medications as prescribed was emphasized.  2. Follow up for recheck of Bleeding patterns in about 4 months time          Total time spent today with Laure  was 20 minutes (level 3).  Greater than 50% of the time was spent coordinating care, answering her questions and counseling regarding pathophysiology of her presenting problem along with plans for any diagnositc work-up and treatment.    This note was electronically signed.    Maged Marcelo M.D.  2019    Note: Speech recognition transcription software may have been used to create portions of this document.  An attempt at proofreading has been made but errors in transcription could still be present.

## 2019-08-09 ENCOUNTER — OFFICE VISIT (OUTPATIENT)
Dept: FAMILY MEDICINE CLINIC | Facility: CLINIC | Age: 49
End: 2019-08-09

## 2019-08-09 VITALS
HEIGHT: 60 IN | BODY MASS INDEX: 33.38 KG/M2 | OXYGEN SATURATION: 97 % | HEART RATE: 71 BPM | WEIGHT: 170 LBS | SYSTOLIC BLOOD PRESSURE: 100 MMHG | DIASTOLIC BLOOD PRESSURE: 70 MMHG

## 2019-08-09 DIAGNOSIS — E66.09 CLASS 1 OBESITY DUE TO EXCESS CALORIES WITH BODY MASS INDEX (BMI) OF 33.0 TO 33.9 IN ADULT, UNSPECIFIED WHETHER SERIOUS COMORBIDITY PRESENT: ICD-10-CM

## 2019-08-09 DIAGNOSIS — D25.0 INTRAMURAL, SUBMUCOUS, AND SUBSEROUS LEIOMYOMA OF UTERUS: ICD-10-CM

## 2019-08-09 DIAGNOSIS — R92.8 ABNORMAL MAMMOGRAM: ICD-10-CM

## 2019-08-09 DIAGNOSIS — G89.29 CHRONIC PAIN OF BOTH KNEES: ICD-10-CM

## 2019-08-09 DIAGNOSIS — D25.1 INTRAMURAL, SUBMUCOUS, AND SUBSEROUS LEIOMYOMA OF UTERUS: ICD-10-CM

## 2019-08-09 DIAGNOSIS — N60.19 FIBROCYSTIC BREAST DISEASE (FCBD), UNSPECIFIED LATERALITY: ICD-10-CM

## 2019-08-09 DIAGNOSIS — Z00.00 ROUTINE MEDICAL EXAM: Primary | ICD-10-CM

## 2019-08-09 DIAGNOSIS — D25.2 INTRAMURAL, SUBMUCOUS, AND SUBSEROUS LEIOMYOMA OF UTERUS: ICD-10-CM

## 2019-08-09 DIAGNOSIS — E55.9 VITAMIN D INSUFFICIENCY: ICD-10-CM

## 2019-08-09 DIAGNOSIS — D50.9 IRON DEFICIENCY ANEMIA, UNSPECIFIED IRON DEFICIENCY ANEMIA TYPE: ICD-10-CM

## 2019-08-09 DIAGNOSIS — M25.562 CHRONIC PAIN OF BOTH KNEES: ICD-10-CM

## 2019-08-09 DIAGNOSIS — M25.561 CHRONIC PAIN OF BOTH KNEES: ICD-10-CM

## 2019-08-09 DIAGNOSIS — R51.9 ACUTE NONINTRACTABLE HEADACHE, UNSPECIFIED HEADACHE TYPE: ICD-10-CM

## 2019-08-09 PROBLEM — E66.811 CLASS 1 OBESITY DUE TO EXCESS CALORIES WITH BODY MASS INDEX (BMI) OF 33.0 TO 33.9 IN ADULT: Status: ACTIVE | Noted: 2019-08-09

## 2019-08-09 PROCEDURE — 99396 PREV VISIT EST AGE 40-64: CPT | Performed by: PHYSICIAN ASSISTANT

## 2019-08-09 NOTE — PROGRESS NOTES
Reason for visit    Laure Valentin is a 48 y.o. female who presents for annual comprehensive exam.    Chief Complaint   Patient presents with   • Annual Exam       HPI     She reports feeling well overall. Recently had Mirena IUD placed with her GYN and has upcoming routine follow-up next week. Her recent cycle not as heavy. She decreased her iron supplement from three times daily to twice daily in the past week. No soa or fatigue. She has a history of headaches when anemic in the past. She has had more in the past 1-2 weeks. Frontal area headaches at times helped by Tylenol but not always. She denies sensitivity to sound or light, aura. Occasionally takes Ibuprofen for knee pain s/p right knee surgery last year.  She denies a personal history of migraines or family history.  She works in employee health and believes she is current with tetanus vaccination. Patient states he will check on this. Current with dental and eye exams. She exercises 5 days weekly.  She at times has had lapses in her diet has gained 10 pounds over the past year.    Past Medical History:   Diagnosis Date   • GERD (gastroesophageal reflux disease)     resolved w/ WLS   • History of kidney stones 2011    most recent 12/24/17   • History of transfusion of 4 units of packed RBC 05/2019   • Intramural uterine fibroid 2018   • Microcytic anemia        Past Surgical History:   Procedure Laterality Date   • CERVICAL CERCLAGE  2001   • COLONOSCOPY  2011   • D&C WITH SUCTION  1997   • GASTRIC SLEEVE LAPAROSCOPIC N/A 1/26/2017    Procedure: GASTRIC SLEEVE LAPAROSCOPY   • KNEE MENISCECTOMY Right 12/2018   • LAPAROSCOPIC TUBAL LIGATION  2002   • [place] LAP,ESOPHAGOGAST FUNDOPLASTY N/A 1/26/2017    Procedure: HIATAL HERNIA REPAIR LAPAROSCOPIC;  Surgeon: Eulalio Dick MD   • WISDOM TOOTH EXTRACTION  1992       Family History   Problem Relation Age of Onset   • Hypertension Mother    • Hyperlipidemia Mother    • Osteoarthritis Mother    • No Known  Problems Father    • Breast cancer Neg Hx    • Ovarian cancer Neg Hx        Social History     Socioeconomic History   • Marital status:      Spouse name: Not on file   • Number of children: Not on file   • Years of education: Not on file   • Highest education level: Not on file   Tobacco Use   • Smoking status: Never Smoker   • Smokeless tobacco: Never Used   Substance and Sexual Activity   • Alcohol use: No   • Drug use: No   • Sexual activity: Defer       No Known Allergies    ROS    Review of Systems   Constitutional: Positive for fatigue. Negative for appetite change, chills, diaphoresis, fever, unexpected weight gain and unexpected weight loss.   HENT: Negative for congestion, hearing loss, sore throat, trouble swallowing and voice change.    Eyes: Negative for blurred vision and visual disturbance.   Respiratory: Negative for apnea, cough, choking, shortness of breath and wheezing.    Cardiovascular: Negative for chest pain, palpitations and leg swelling.   Gastrointestinal: Positive for nausea. Negative for abdominal pain, blood in stool, constipation, diarrhea, vomiting and GERD.   Endocrine: Negative for cold intolerance, heat intolerance, polydipsia and polyuria.   Genitourinary: Negative for breast discharge, dysuria, frequency, hematuria, pelvic pain, vaginal pain, breast lump and breast pain.   Musculoskeletal: Negative for arthralgias and joint swelling.   Skin: Negative for rash and skin lesions.   Allergic/Immunologic: Negative for environmental allergies.   Neurological: Positive for headache. Negative for dizziness, light-headedness, numbness and memory problem.   Hematological: Negative for adenopathy. Bruises/bleeds easily.   Psychiatric/Behavioral: Negative for sleep disturbance, depressed mood and stress. The patient is not nervous/anxious.        Vitals:    08/09/19 1351   BP: 100/70   Pulse: 71   SpO2: 97%         Current Outpatient Medications:   •  ferrous sulfate 325 (65 FE) MG  tablet, Take 1 tablet by mouth 3 (Three) Times a Day With Meals., Disp: 90 tablet, Rfl: 3  •  ibuprofen (ADVIL,MOTRIN) 400 MG tablet, Take 400 mg by mouth As Needed for Mild Pain ., Disp: , Rfl:   •  Multiple Vitamins-Minerals (MULTIVITAMIN ADULT PO), Take 1 tablet by mouth Daily., Disp: , Rfl:     PE    Physical Exam   Constitutional: She appears well-developed and well-nourished. No distress.   HENT:   Head: Normocephalic and atraumatic.   Right Ear: Hearing, tympanic membrane and ear canal normal.   Left Ear: Hearing, tympanic membrane and ear canal normal.   Mouth/Throat: Uvula is midline, oropharynx is clear and moist and mucous membranes are normal. Normal dentition.   Eyes: Conjunctivae and EOM are normal. Pupils are equal, round, and reactive to light.   Neck: Normal range of motion. Neck supple. No JVD present. Carotid bruit is not present. No neck rigidity. No thyroid mass and no thyromegaly present.   Cardiovascular: Normal rate, regular rhythm, S1 normal, S2 normal, normal heart sounds and intact distal pulses.   No murmur heard.  Pulmonary/Chest: Effort normal and breath sounds normal.   Abdominal: Soft. Normal appearance and bowel sounds are normal. She exhibits no mass. There is no tenderness.   Genitourinary:   Genitourinary Comments: Deferred to gynecology   Musculoskeletal: Normal range of motion. She exhibits no edema.     Vascular Status -  Her right foot exhibits normal foot vasculature  and no edema. Her left foot exhibits normal foot vasculature  and no edema.  Lymphadenopathy:     She has no cervical adenopathy.        Right: No supraclavicular adenopathy present.        Left: No supraclavicular adenopathy present.   Neurological: She is alert. She has normal strength. She displays normal reflexes. No cranial nerve deficit (no focal deficit). Gait normal.   Skin: Skin is warm and dry. No rash noted. No cyanosis. Nails show no clubbing.   No suspicious lesions.    Psychiatric: She has a normal  mood and affect. Her behavior is normal.   Vitals reviewed.      A/P    Problem List Items Addressed This Visit        Digestive    Vitamin D insufficiency    Overview     -Normal vitamin D level in March with her bariatric surgeon         Class 1 obesity due to excess calories with body mass index (BMI) of 33.0 to 33.9 in adult  -Encourage diet improvement in weight reduction.  Patient believes she will be able to accomplish this diet.  Continue exercise  -Follow-up 6 months       Musculoskeletal and Integument    Knee pain, bilateral    Overview     8/18 - Medial and lateral meniscus tears, baker's cyst, per R knee MRI; R knee arthroscopy 12/18 with Dr. Gill.  Ibuprofen as needed improves          Genitourinary    Intramural, submucous, and subserous leiomyoma of uterus  -Episodes of menorrhagia have thus far been improved with IUD placement  -Encourage continued follow-up with her GYN       Hematopoietic and Hemostatic    Iron deficiency anemia    Overview     -Has had colonoscopy.   -Gastric sleeve 1/2018 but patient states anemia predates her bariatric surgery  -Requiring iron infusions last year.   -hgb 10 on 6/7/19.  Monitor CBC and iron studies today         Other Visit Diagnoses     Routine medical exam    -  Primary  -Age-appropriate screening and counseling discussed  -History of right-sided breast asymmetry being followed annually. Will order mammogram today  -Patient to check on tetanus status  -Monitor labs    Relevant Orders    Mammo Diagnostic Digital Tomosynthesis Bilateral With CAD    CBC Auto Differential    Ferritin    Iron    Lipid Panel    TSH    Urinalysis With Culture If Indicated -    Headache  -Most likely sinus or tension headache  -Has responded to Tylenol intermittently  -She has had similar headaches in the past with iron deficiency anemia.  Follow-up blood cell counts and iron studies today          Plan of care reviewed with patient at the conclusion of today's visit. Education was  provided regarding diagnosis, management and any prescribed or recommended OTC medications.  Patient verbalizes understanding of and agreement with management plan.        JAMES Guerrero

## 2019-08-12 ENCOUNTER — LAB (OUTPATIENT)
Dept: LAB | Facility: HOSPITAL | Age: 49
End: 2019-08-12

## 2019-08-12 DIAGNOSIS — Z00.00 ROUTINE MEDICAL EXAM: ICD-10-CM

## 2019-08-12 PROBLEM — N60.11 BILATERAL FIBROCYSTIC BREAST DISEASE: Status: ACTIVE | Noted: 2019-08-12

## 2019-08-12 PROBLEM — N60.12 BILATERAL FIBROCYSTIC BREAST DISEASE: Status: ACTIVE | Noted: 2019-08-12

## 2019-08-12 PROBLEM — N60.19 FIBROCYSTIC BREAST DISEASE: Status: ACTIVE | Noted: 2019-08-12

## 2019-08-12 LAB
BACTERIA UR QL AUTO: ABNORMAL /HPF
BASOPHILS # BLD AUTO: 0.06 10*3/MM3 (ref 0–0.2)
BASOPHILS NFR BLD AUTO: 0.9 % (ref 0–1.5)
BILIRUB UR QL STRIP: NEGATIVE
CHOLEST SERPL-MCNC: 164 MG/DL (ref 0–200)
CLARITY UR: CLEAR
COD CRY URNS QL: ABNORMAL /HPF
COLOR UR: YELLOW
DEPRECATED RDW RBC AUTO: 50.7 FL (ref 37–54)
EOSINOPHIL # BLD AUTO: 0.04 10*3/MM3 (ref 0–0.4)
EOSINOPHIL NFR BLD AUTO: 0.6 % (ref 0.3–6.2)
ERYTHROCYTE [DISTWIDTH] IN BLOOD BY AUTOMATED COUNT: 16.4 % (ref 12.3–15.4)
FERRITIN SERPL-MCNC: 13.9 NG/ML (ref 13–150)
GLUCOSE UR STRIP-MCNC: NEGATIVE MG/DL
HCT VFR BLD AUTO: 38.6 % (ref 34–46.6)
HDLC SERPL-MCNC: 62 MG/DL (ref 40–60)
HGB BLD-MCNC: 12 G/DL (ref 12–15.9)
HGB UR QL STRIP.AUTO: NEGATIVE
HYALINE CASTS UR QL AUTO: ABNORMAL /LPF
IMM GRANULOCYTES # BLD AUTO: 0.02 10*3/MM3 (ref 0–0.05)
IMM GRANULOCYTES NFR BLD AUTO: 0.3 % (ref 0–0.5)
IRON 24H UR-MRATE: 67 MCG/DL (ref 37–145)
KETONES UR QL STRIP: ABNORMAL
LDLC SERPL CALC-MCNC: 93 MG/DL (ref 0–100)
LDLC/HDLC SERPL: 1.5 {RATIO}
LEUKOCYTE ESTERASE UR QL STRIP.AUTO: ABNORMAL
LYMPHOCYTES # BLD AUTO: 1.9 10*3/MM3 (ref 0.7–3.1)
LYMPHOCYTES NFR BLD AUTO: 29.9 % (ref 19.6–45.3)
MCH RBC QN AUTO: 27.1 PG (ref 26.6–33)
MCHC RBC AUTO-ENTMCNC: 31.1 G/DL (ref 31.5–35.7)
MCV RBC AUTO: 87.3 FL (ref 79–97)
MONOCYTES # BLD AUTO: 0.56 10*3/MM3 (ref 0.1–0.9)
MONOCYTES NFR BLD AUTO: 8.8 % (ref 5–12)
NEUTROPHILS # BLD AUTO: 3.77 10*3/MM3 (ref 1.7–7)
NEUTROPHILS NFR BLD AUTO: 59.5 % (ref 42.7–76)
NITRITE UR QL STRIP: NEGATIVE
NRBC BLD AUTO-RTO: 0 /100 WBC (ref 0–0.2)
PH UR STRIP.AUTO: 6.5 [PH] (ref 5–8)
PLATELET # BLD AUTO: 416 10*3/MM3 (ref 140–450)
PMV BLD AUTO: 10.7 FL (ref 6–12)
PROT UR QL STRIP: NEGATIVE
RBC # BLD AUTO: 4.42 10*6/MM3 (ref 3.77–5.28)
RBC # UR: ABNORMAL /HPF
REF LAB TEST METHOD: ABNORMAL
SP GR UR STRIP: >=1.03 (ref 1–1.03)
SQUAMOUS #/AREA URNS HPF: ABNORMAL /HPF
TRIGL SERPL-MCNC: 45 MG/DL (ref 0–150)
TSH SERPL DL<=0.05 MIU/L-ACNC: 0.75 MIU/ML (ref 0.27–4.2)
UROBILINOGEN UR QL STRIP: ABNORMAL
VLDLC SERPL-MCNC: 9 MG/DL (ref 5–40)
WBC NRBC COR # BLD: 6.35 10*3/MM3 (ref 3.4–10.8)
WBC UR QL AUTO: ABNORMAL /HPF

## 2019-08-12 PROCEDURE — 82728 ASSAY OF FERRITIN: CPT

## 2019-08-12 PROCEDURE — 85025 COMPLETE CBC W/AUTO DIFF WBC: CPT

## 2019-08-12 PROCEDURE — 81001 URINALYSIS AUTO W/SCOPE: CPT

## 2019-08-12 PROCEDURE — 80061 LIPID PANEL: CPT

## 2019-08-12 PROCEDURE — 83540 ASSAY OF IRON: CPT

## 2019-08-12 PROCEDURE — 36415 COLL VENOUS BLD VENIPUNCTURE: CPT

## 2019-08-12 PROCEDURE — 84443 ASSAY THYROID STIM HORMONE: CPT

## 2019-08-13 RX ORDER — FERROUS SULFATE 325(65) MG
325 TABLET ORAL DAILY
Qty: 90 TABLET | Refills: 1 | Status: SHIPPED | OUTPATIENT
Start: 2019-08-13 | End: 2022-09-13

## 2019-10-17 ENCOUNTER — APPOINTMENT (OUTPATIENT)
Dept: GENERAL RADIOLOGY | Facility: HOSPITAL | Age: 49
End: 2019-10-17

## 2019-10-17 ENCOUNTER — HOSPITAL ENCOUNTER (EMERGENCY)
Facility: HOSPITAL | Age: 49
Discharge: HOME | End: 2019-10-17
Attending: EMERGENCY MEDICINE | Admitting: EMERGENCY MEDICINE

## 2019-10-17 VITALS
HEIGHT: 63 IN | RESPIRATION RATE: 18 BRPM | TEMPERATURE: 98.2 F | BODY MASS INDEX: 30.12 KG/M2 | DIASTOLIC BLOOD PRESSURE: 71 MMHG | WEIGHT: 170 LBS | HEART RATE: 78 BPM | OXYGEN SATURATION: 98 % | SYSTOLIC BLOOD PRESSURE: 142 MMHG

## 2019-10-17 DIAGNOSIS — S93.402A SPRAIN OF LEFT ANKLE, UNSPECIFIED LIGAMENT, INITIAL ENCOUNTER: ICD-10-CM

## 2019-10-17 DIAGNOSIS — S46.912A STRAIN OF LEFT SHOULDER, INITIAL ENCOUNTER: Primary | ICD-10-CM

## 2019-10-17 PROCEDURE — 73030 X-RAY EXAM OF SHOULDER: CPT

## 2019-10-17 PROCEDURE — 73610 X-RAY EXAM OF ANKLE: CPT

## 2019-10-17 PROCEDURE — 99282 EMERGENCY DEPT VISIT SF MDM: CPT

## 2019-10-17 NOTE — ED PROVIDER NOTES
Subjective   48-year-old female presents for evaluation of left shoulder and ankle pain.  She states that approximately 10 days ago, she was getting out of a car when evening when she excellently tripped, fell, and landed awkwardly on her left side.  She has been experiencing mild pain in her left ankle and shoulder since that time.  She is right-handed.  She is ambulatory.  Given her persistent pain and soreness, she came to the emergency department today for evaluation.  No paresthesias.  Isolated injuries.        History provided by:  Patient  Fall   Mechanism of injury: fall    Injury location:  Foot  Foot injury location:  L ankle  Time since incident:  10 days  Arrived directly from scene: no    Fall:     Fall occurred: from a car.    Impact surface: gravel.    Point of impact: left shoulder.    Entrapped after fall: no    Protective equipment: none    Tetanus status:  Unknown  Prior to arrival data:     Bystander interventions:  None    Patient ambulatory at scene: yes      Loss of consciousness: no      Amnesic to event: no    Current pain details:     Pain Severity:  Moderate    Pain timing:  Constant      Review of Systems   Musculoskeletal:        Left ankle pain, left shoulder pain       Past Medical History:   Diagnosis Date   • GERD (gastroesophageal reflux disease)     resolved w/ WLS   • History of kidney stones 2011    most recent 12/24/17   • History of transfusion of 4 units of packed RBC 05/2019   • Intramural uterine fibroid 2018   • Microcytic anemia        No Known Allergies    Past Surgical History:   Procedure Laterality Date   • CERVICAL CERCLAGE  2001   • COLONOSCOPY  2011   • D&C WITH SUCTION  1997   • GASTRIC SLEEVE LAPAROSCOPIC N/A 1/26/2017    Procedure: GASTRIC SLEEVE LAPAROSCOPY   • KNEE MENISCECTOMY Right 12/2018   • LAPAROSCOPIC TUBAL LIGATION  2002   • [place] LAP,ESOPHAGOGAST FUNDOPLASTY N/A 1/26/2017    Procedure: HIATAL HERNIA REPAIR LAPAROSCOPIC;  Surgeon: Eulalio Dick MD    • WISDOM TOOTH EXTRACTION  1992       Family History   Problem Relation Age of Onset   • Hypertension Mother    • Hyperlipidemia Mother    • Osteoarthritis Mother    • No Known Problems Father    • Breast cancer Neg Hx    • Ovarian cancer Neg Hx        Social History     Socioeconomic History   • Marital status:      Spouse name: Not on file   • Number of children: Not on file   • Years of education: Not on file   • Highest education level: Not on file   Tobacco Use   • Smoking status: Never Smoker   • Smokeless tobacco: Never Used   Substance and Sexual Activity   • Alcohol use: No   • Drug use: No   • Sexual activity: Defer         Objective   Physical Exam   Constitutional: She is oriented to person, place, and time. She appears well-developed and well-nourished. No distress.   Well-appearing female in no acute distress   HENT:   Head: Normocephalic and atraumatic.   Neck:   No midline cervical spine tenderness to palpation, no step-offs or deformities noted   Cardiovascular: Normal rate, regular rhythm and normal heart sounds. Exam reveals no gallop and no friction rub.   No murmur heard.  Pulmonary/Chest: Effort normal and breath sounds normal. No respiratory distress. She has no wheezes. She has no rales.   Musculoskeletal:   Range of motion of left ankle and left shoulder within normal limits and only minimally painful, no bony deformity or step-off noted    Mild soft tissue swelling noted to lateral aspect of left ankle   Neurological: She is alert and oriented to person, place, and time.   Neurovascularly intact distally in all fours with bounding distal pulses normal sensation, normal gait   Skin: Skin is warm and dry. No rash noted. She is not diaphoretic. No erythema.   Psychiatric: She has a normal mood and affect. Judgment and thought content normal.   Nursing note and vitals reviewed.      Procedures         ED Course  ED Course as of Oct 17 1727   u Oct 17, 2019   1727 48-year-old female  "presents complaining of left shoulder and ankle pain after mechanical trip and fall 10 days ago.  On arrival to the ED, patient well-appearing.  Unremarkable exam aside from mild soft tissue swelling to lateral aspect of left ankle.  The patient is neurovascularly intact.  Patient declined any pain medications.  Plain films negative for fracture.  Reassured and counseled regarding strain/sprain.  The patient will follow up with her primary care physician within the next week.  [DD]   1727 Agreeable with plan and given appropriate strict return precautions.  [DD]      ED Course User Index  [DD] Aidan Reed MD     No results found for this or any previous visit (from the past 24 hour(s)).  Note: In addition to lab results from this visit, the labs listed above may include labs taken at another facility or during a different encounter within the last 24 hours. Please correlate lab times with ED admission and discharge times for further clarification of the services performed during this visit.    XR Ankle 3+ View Left    (Results Pending)   XR Shoulder 2+ View Left    (Results Pending)     Vitals:    10/17/19 1615   BP: 142/71   BP Location: Left arm   Patient Position: Sitting   Pulse: 78   Resp: 18   Temp: 98.2 °F (36.8 °C)   TempSrc: Oral   SpO2: 98%   Weight: 77.1 kg (170 lb)   Height: 160 cm (63\")     Medications - No data to display  ECG/EMG Results (last 24 hours)     ** No results found for the last 24 hours. **        No orders to display       No results found for this or any previous visit (from the past 24 hour(s)).  Note: In addition to lab results from this visit, the labs listed above may include labs taken at another facility or during a different encounter within the last 24 hours. Please correlate lab times with ED admission and discharge times for further clarification of the services performed during this visit.    XR Ankle 3+ View Left   Preliminary Result       No acute osseous " "abnormality.       Mild ankle soft tissue swelling.       DICTATED:   10/17/2019   EDITED/ls :   10/17/2019           XR Shoulder 2+ View Left   Preliminary Result       No convincing evidence of acute osseous abnormality identified.       Small ossific density on the lateral humeral head favored degenerative.   If symptoms persist, consider follow-up MRI imaging of the shoulder.       DICTATED:   10/17/2019   EDITED/ls :   10/17/2019             Vitals:    10/17/19 1615   BP: 142/71   BP Location: Left arm   Patient Position: Sitting   Pulse: 78   Resp: 18   Temp: 98.2 °F (36.8 °C)   TempSrc: Oral   SpO2: 98%   Weight: 77.1 kg (170 lb)   Height: 160 cm (63\")     Medications - No data to display  ECG/EMG Results (last 24 hours)     ** No results found for the last 24 hours. **        No orders to display                     MDM    Final diagnoses:   Strain of left shoulder, initial encounter   Sprain of left ankle, unspecified ligament, initial encounter       Documentation assistance provided by alan Kincaid.  Information recorded by the scribe was done at my direction and has been verified and validated by me.     Adriel Kincaid  10/17/19 [id number]       Aidan Reed MD  10/17/19 [id number]    "

## 2019-10-17 NOTE — DISCHARGE INSTRUCTIONS
Please call to schedule a follow-up appointment with Dr. Garcia in one week.     Immediately return to the emergency department for any new or worsening symptoms.     Please review the medications you are supposed to be taking according to prior physician recommendations. I have not changed your home medications during this visit. If your discharge instructions indicate that I have changed your home medications, this is not the case, and you should disregard. If you have any questions about the medication you should be taking at home, please call your physician.

## 2019-11-05 ENCOUNTER — HOSPITAL ENCOUNTER (OUTPATIENT)
Dept: MAMMOGRAPHY | Facility: HOSPITAL | Age: 49
Discharge: HOME | End: 2019-11-05
Admitting: PHYSICIAN ASSISTANT

## 2019-11-05 ENCOUNTER — HOSPITAL ENCOUNTER (OUTPATIENT)
Dept: ULTRASOUND IMAGING | Facility: HOSPITAL | Age: 49
Discharge: HOME | End: 2019-11-05

## 2019-11-05 DIAGNOSIS — R92.8 ABNORMAL MAMMOGRAM: ICD-10-CM

## 2019-11-05 DIAGNOSIS — N60.19 FIBROCYSTIC BREAST DISEASE (FCBD), UNSPECIFIED LATERALITY: ICD-10-CM

## 2019-11-05 PROCEDURE — 77066 DX MAMMO INCL CAD BI: CPT

## 2019-11-05 PROCEDURE — 76642 ULTRASOUND BREAST LIMITED: CPT

## 2019-11-05 PROCEDURE — 77066 DX MAMMO INCL CAD BI: CPT | Performed by: RADIOLOGY

## 2019-11-05 PROCEDURE — 77062 BREAST TOMOSYNTHESIS BI: CPT | Performed by: RADIOLOGY

## 2019-11-05 PROCEDURE — 76642 ULTRASOUND BREAST LIMITED: CPT | Performed by: RADIOLOGY

## 2019-11-05 PROCEDURE — G0279 TOMOSYNTHESIS, MAMMO: HCPCS

## 2019-11-06 ENCOUNTER — OFFICE VISIT (OUTPATIENT)
Dept: ORTHOPEDIC SURGERY | Facility: CLINIC | Age: 49
End: 2019-11-06

## 2019-11-06 VITALS — BODY MASS INDEX: 30.12 KG/M2 | HEIGHT: 63 IN | WEIGHT: 169.97 LBS | OXYGEN SATURATION: 92 % | HEART RATE: 68 BPM

## 2019-11-06 DIAGNOSIS — M25.512 ACUTE PAIN OF LEFT SHOULDER: Primary | ICD-10-CM

## 2019-11-06 PROCEDURE — 99213 OFFICE O/P EST LOW 20 MIN: CPT | Performed by: ORTHOPAEDIC SURGERY

## 2019-11-06 NOTE — PROGRESS NOTES
[facility] [place] [facility] Note    Subjective     Chief Complaint   Patient presents with   • Left Shoulder - Pain        HPI    Laure Valentin is a 48 y.o. female.  She presents today for evaluation of left shoulder pain.  She injured her left shoulder when she fell about 4 weeks ago, and the pain is unchanged from the date of onset.  She was seen in the emergency room, and x-rays were obtained which showed no fracture.  Pain is 4 out of 10, dull and achy, worse with certain motions.      Patient Active Problem List   Diagnosis   • Heart murmur   • Iron deficiency anemia   • Annual GYN exam w/o problems   • Vitamin D insufficiency   • Knee pain, bilateral   • Poor iron absorption   • Anemia due to chronic blood loss   • Intramural, submucous, and subserous leiomyoma of uterus   • Mirena   • Class 1 obesity due to excess calories with body mass index (BMI) of 33.0 to 33.9 in adult   • Acute nonintractable headache   • Fibrocystic breast disease     Past Medical History:   Diagnosis Date   • GERD (gastroesophageal reflux disease)     resolved w/ WLS   • History of kidney stones 2011    most recent 12/24/17   • History of transfusion of 4 units of packed RBC 05/2019   • Intramural uterine fibroid 2018   • Microcytic anemia       Past Surgical History:   Procedure Laterality Date   • CERVICAL CERCLAGE  2001   • COLONOSCOPY  2011   • D&C WITH SUCTION  1997   • GASTRIC SLEEVE LAPAROSCOPIC N/A 1/26/2017    Procedure: GASTRIC SLEEVE LAPAROSCOPY   • KNEE MENISCECTOMY Right 12/2018   • LAPAROSCOPIC TUBAL LIGATION  2002   • CA LAP,ESOPHAGOGAST FUNDOPLASTY N/A 1/26/2017    Procedure: HIATAL HERNIA REPAIR LAPAROSCOPIC;  Surgeon: Eulalio Dick MD   • WISDOM TOOTH EXTRACTION  1992      Family History   Problem Relation Age of Onset   • Hypertension Mother    • Hyperlipidemia Mother    • Osteoarthritis Mother    • No Known Problems Father    • Breast cancer Neg Hx    • Ovarian cancer Neg Hx      Social History      Socioeconomic History   • Marital status:      Spouse name: Not on file   • Number of children: Not on file   • Years of education: Not on file   • Highest education level: Not on file   Tobacco Use   • Smoking status: Never Smoker   • Smokeless tobacco: Never Used   Substance and Sexual Activity   • Alcohol use: No   • Drug use: No   • Sexual activity: Defer      Current Outpatient Medications on File Prior to Visit   Medication Sig Dispense Refill   • ferrous sulfate 325 (65 FE) MG tablet Take 1 tablet by mouth Daily. 90 tablet 1   • ibuprofen (ADVIL,MOTRIN) 400 MG tablet Take 400 mg by mouth As Needed for Mild Pain .     • Multiple Vitamins-Minerals (MULTIVITAMIN ADULT PO) Take 1 tablet by mouth Daily.     • [DISCONTINUED] amoxicillin (AMOXIL) 500 MG capsule Take 1 capsule by mouth 2 (Two) Times a Day for 10 days 20 capsule 0     No current facility-administered medications on file prior to visit.       No Known Allergies     Review of Systems   Constitutional: Negative for activity change, appetite change, chills, diaphoresis, fatigue, fever and unexpected weight change.   HENT: Negative for congestion, dental problem, drooling, ear discharge, ear pain, facial swelling, hearing loss, mouth sores, nosebleeds, postnasal drip, rhinorrhea, sinus pressure, sneezing, sore throat, tinnitus, trouble swallowing and voice change.    Eyes: Negative for photophobia, pain, discharge, redness, itching and visual disturbance.   Respiratory: Negative for apnea, cough, choking, chest tightness, shortness of breath, wheezing and stridor.    Cardiovascular: Negative for chest pain, palpitations and leg swelling.   Gastrointestinal: Negative for abdominal distention, abdominal pain, anal bleeding, blood in stool, constipation, diarrhea, nausea, rectal pain and vomiting.   Endocrine: Negative for cold intolerance, heat intolerance, polydipsia, polyphagia and polyuria.   Genitourinary: Negative for decreased urine volume,  "difficulty urinating, dysuria, enuresis, flank pain, frequency, genital sores, hematuria and urgency.   Musculoskeletal: Negative for arthralgias, back pain, gait problem, joint swelling, myalgias, neck pain and neck stiffness.   Skin: Negative for color change, pallor, rash and wound.   Allergic/Immunologic: Negative for environmental allergies, food allergies and immunocompromised state.   Neurological: Negative for dizziness, tremors, seizures, syncope, facial asymmetry, speech difficulty, weakness, light-headedness, numbness and headaches.   Hematological: Negative for adenopathy. Does not bruise/bleed easily.   Psychiatric/Behavioral: Negative for agitation, behavioral problems, confusion, decreased concentration, dysphoric mood, hallucinations, self-injury, sleep disturbance and suicidal ideas. The patient is not nervous/anxious and is not hyperactive.         Objective      Physical Exam  Pulse 68   Ht 160 cm (62.99\")   Wt 77.1 kg (169 lb 15.6 oz)   LMP 10/21/2019   SpO2 92%   BMI 30.12 kg/m²     Body mass index is 30.12 kg/m².    General:   Mental Status:  Alert   Appearance: Cooperative, in no acute distress   Build and Nutrition: Well-nourished well-developed female   Orientation: Alert and oriented to person, place and time   Posture: Normal   Gait: Normal    Integument:   Left shoulder: No skin lesions, no rash, no ecchymosis    Neurologic:   Sensation:    Left hand: Intact to light touch in the digits   Motor:  Left upper extremity: 5/5 deltoid, biceps, triceps, wrist flexors, wrist extensors, and interossei    Upper Extremities:   Left Shoulder:    Tenderness:  Lateral shoulder tenderness    Swelling:  None    Crepitus: None    Atrophy:  None    Range of motion:  External rotation:  80°       Forward flexion:  180°       Abduction:   180°  Instability:  None  Deformities:  None  Functional testing: Negative drop arm, negative lift-off, positive impingement      Imaging/Studies  EXAMINATION: XR " SHOULDER 3 VIEWS LEFT - 10/17/2019      INDICATION: S46.912A-Strain of unspecified muscle, fascia and tendon at  shoulder and upper arm level, left arm, initial encounter;  S93.402A-Sprain of unspecified ligament of left ankle, initial  encounter.      COMPARISON: NONE     FINDINGS: 3 views of the left shoulder were submitted for review. There  is normal anatomic alignment of the left shoulder without evidence for  fracture and/or dislocation. There is a small ossific density along the  lateral aspect of the humeral head favored degenerative. No convincing  evidence of humeral head fracture identified. The glenoid and scapula  appear intact. The clavicle appears intact. The left chest appears  intact.         IMPRESSION:     No convincing evidence of acute osseous abnormality identified.     Small ossific density on the lateral humeral head favored degenerative.  If symptoms persist, consider follow-up MRI imaging of the shoulder.     DICTATED:   10/17/2019  EDITED/ls :   10/17/2019      This report was finalized on 10/18/2019 5:30 PM by Dr. Hunter Suarez MD.      Assessment and Plan     Laure was seen today for pain.    Diagnoses and all orders for this visit:    Acute pain of left shoulder  -     MRI Shoulder Left Without Contrast; Future        1. Acute pain of left shoulder        I reviewed my findings with the patient today.  We discussed options today regarding her left shoulder pain, and at this point I recommend an MRI, specifically looking at the rotator cuff and the internal bony structure.  She may have a deep contusion or rotator cuff injury.  I will see her back after the MRI, but sooner for any problems.    Return for After Imaging Study.      Medical Decision Making  Data/Risk: independent visualization of imaging, lab tests, or EMG/NCV      El Gill MD  11/06/19  2:21 PM

## 2019-11-14 ENCOUNTER — HOSPITAL ENCOUNTER (OUTPATIENT)
Dept: MRI IMAGING | Facility: HOSPITAL | Age: 49
Discharge: HOME | End: 2019-11-14
Admitting: ORTHOPAEDIC SURGERY

## 2019-11-14 DIAGNOSIS — M25.512 ACUTE PAIN OF LEFT SHOULDER: ICD-10-CM

## 2019-11-14 PROCEDURE — 73221 MRI JOINT UPR EXTREM W/O DYE: CPT

## 2019-11-27 ENCOUNTER — OFFICE VISIT (OUTPATIENT)
Dept: ORTHOPEDIC SURGERY | Facility: CLINIC | Age: 49
End: 2019-11-27

## 2019-11-27 VITALS — WEIGHT: 169.97 LBS | BODY MASS INDEX: 30.12 KG/M2 | OXYGEN SATURATION: 97 % | HEART RATE: 119 BPM | HEIGHT: 63 IN

## 2019-11-27 DIAGNOSIS — M75.82 TENDINITIS OF LEFT ROTATOR CUFF: Primary | ICD-10-CM

## 2019-11-27 PROCEDURE — 99213 OFFICE O/P EST LOW 20 MIN: CPT | Performed by: ORTHOPAEDIC SURGERY

## 2019-11-27 NOTE — PROGRESS NOTES
[facility] [place] [facility] Note    Subjective     Chief Complaint   Patient presents with   • Left Shoulder - Follow-up     MRI follow up.        HPI    Laure Valentin is a 48 y.o. female.  She follows up today for the MRI results of her left shoulder.  She is doing well today, with only minimal pain.  It does hurt with overhead activities.  Pain has been present for almost 2 months.  No physical therapy to this point.      Patient Active Problem List   Diagnosis   • Heart murmur   • Iron deficiency anemia   • Annual GYN exam w/o problems   • Vitamin D insufficiency   • Knee pain, bilateral   • Poor iron absorption   • Anemia due to chronic blood loss   • Intramural, submucous, and subserous leiomyoma of uterus   • Mirena   • Class 1 obesity due to excess calories with body mass index (BMI) of 33.0 to 33.9 in adult   • Acute nonintractable headache   • Fibrocystic breast disease     Past Medical History:   Diagnosis Date   • GERD (gastroesophageal reflux disease)     resolved w/ WLS   • History of kidney stones 2011    most recent 12/24/17   • History of transfusion of 4 units of packed RBC 05/2019   • Intramural uterine fibroid 2018   • Microcytic anemia       Past Surgical History:   Procedure Laterality Date   • CERVICAL CERCLAGE  2001   • COLONOSCOPY  2011   • D&C WITH SUCTION  1997   • GASTRIC SLEEVE LAPAROSCOPIC N/A 1/26/2017    Procedure: GASTRIC SLEEVE LAPAROSCOPY   • KNEE MENISCECTOMY Right 12/2018   • LAPAROSCOPIC TUBAL LIGATION  2002   • [place] LAP,ESOPHAGOGAST FUNDOPLASTY N/A 1/26/2017    Procedure: HIATAL HERNIA REPAIR LAPAROSCOPIC;  Surgeon: Eulalio Dick MD   • WISDOM TOOTH EXTRACTION  1992      Family History   Problem Relation Age of Onset   • Hypertension Mother    • Hyperlipidemia Mother    • Osteoarthritis Mother    • No Known Problems Father    • Breast cancer Neg Hx    • Ovarian cancer Neg Hx      Social History     Socioeconomic History   • Marital status:      Spouse  "name: Not on file   • Number of children: Not on file   • Years of education: Not on file   • Highest education level: Not on file   Tobacco Use   • Smoking status: Never Smoker   • Smokeless tobacco: Never Used   Substance and Sexual Activity   • Alcohol use: No   • Drug use: No   • Sexual activity: Defer      Current Outpatient Medications on File Prior to Visit   Medication Sig Dispense Refill   • ferrous sulfate 325 (65 FE) MG tablet Take 1 tablet by mouth Daily. 90 tablet 1   • ibuprofen (ADVIL,MOTRIN) 400 MG tablet Take 400 mg by mouth As Needed for Mild Pain .     • levonorgestrel (MIRENA) 20 MCG/24HR IUD      • Multiple Vitamins-Minerals (MULTIVITAMIN ADULT PO) Take 1 tablet by mouth Daily.       No current facility-administered medications on file prior to visit.       No Known Allergies     Review of Systems   Constitutional: Negative.    HENT: Negative.    Eyes: Negative.    Respiratory: Negative.    Cardiovascular: Negative.    Gastrointestinal: Negative.    Endocrine: Negative.    Genitourinary: Negative.    Musculoskeletal: Positive for arthralgias and joint swelling.   Skin: Negative.    Allergic/Immunologic: Negative.    Neurological: Negative.    Hematological: Negative.    Psychiatric/Behavioral: Negative.         Objective      Physical Exam  Pulse 119   Ht 160 cm (62.99\")   Wt 77.1 kg (169 lb 15.6 oz)   SpO2 97%   BMI 30.12 kg/m²     Body mass index is 30.12 kg/m².    General:   Mental Status:  Alert   Appearance: Cooperative, in no acute distress   Build and Nutrition: Well-nourished well-developed female   Orientation: Alert and oriented to person, place and time   Posture: Normal   Gait: Normal    Upper Extremities:              Left Shoulder:                          Swelling:          None                          Crepitus:          None                          Atrophy:           None                          Range of motion:        External rotation:         80°                         "                                      Forward flexion:          170°                                                              Abduction:                   160°  Instability:        None  Deformities:     None  Functional testing: Negative drop arm, negative lift-off, positive impingement    Imaging/Studies    EXAMINATION: MRI SHOULDER LEFT WO CONTRAST- 11/14/2019     INDICATION: Shoulder pain, rotator cuff tear/impingement suspected;  M25.512-Pain in left shoulder; chronic shoulder pain on the left     TECHNIQUE: Routine multiplanar imaging was obtained of the left shoulder  without the administration of gadolinium contrast.     COMPARISON: NONE     FINDINGS: There is increased signal intensity identified within the  supraspinatus tendon suggesting a tendinopathy and partial tear. The  tear is seen both of the undersurface posteriorly as well as an  intrasubstance tear. There is abnormal signal intensity seen within the  superior labrum suggesting a tear. The inferior labrum is intact.  Anterior labrum reveals slight increased signal suggesting mild injury.  The middle glenohumeral ligament is normal in signal. The posterior  labrum is unremarkable. The long head of the biceps tendon is well  positioned within the bicipital groove and normal in signal intensity.  Subscapularis tendon is normal in signal and appearance. The  infraspinatus tendon and teres minor are normal in signal intensity.  There is spurring identified of the humeral head. There are degenerative  changes seen within the acromioclavicular joint. Minimal degenerative  changes seen within the glenoid.     IMPRESSION:  Tear seen of the superior labrum and partial tear of the  supraspinatus tendon. There is mild injury seen to the anterior labrum  with moderate degenerative changes seen within the shoulder.     D:  11/14/2019  E:  11/14/2019    Assessment and Plan     Laure was seen today for follow-up.    Diagnoses and all orders for this  visit:    Tendinitis of left rotator cuff  -     Ambulatory Referral to Physical Therapy Evaluate and treat        1. Tendinitis of left rotator cuff        I reviewed my findings with patient today.  She may have a partial-thickness rotator cuff tear, and possible labral tear, and at this point I recommend a trial of physical therapy.  I will see her back in 2 months to ensure improvement, but sooner for any problems.  Injection may be considered if appropriate in the future.  Surgical intervention would be reserved for recalcitrant symptoms.    Return in about 2 months (around 1/27/2020).      Medical Decision Making  Management Options : physical/occupational therapy  Data/Risk: independent visualization of imaging, lab tests, or EMG/NCV      El Gill MD  11/27/19  2:32 PM

## 2019-12-04 ENCOUNTER — HOSPITAL ENCOUNTER (OUTPATIENT)
Dept: PHYSICAL THERAPY | Facility: HOSPITAL | Age: 49
Setting detail: THERAPIES SERIES
Discharge: HOME | End: 2019-12-04

## 2019-12-04 DIAGNOSIS — M25.512 ACUTE PAIN OF LEFT SHOULDER: Primary | ICD-10-CM

## 2019-12-04 PROCEDURE — 97161 PT EVAL LOW COMPLEX 20 MIN: CPT

## 2019-12-04 NOTE — THERAPY EVALUATION
Outpatient Physical Therapy Ortho Initial Evaluation  [facility]     Patient Name: Laure Valentin  : 1970  MRN: [id number]  Today's Date: 2019      Visit Date: 2019    Patient Active Problem List   Diagnosis   • Heart murmur   • Iron deficiency anemia   • Annual GYN exam w/o problems   • Vitamin D insufficiency   • Knee pain, bilateral   • Poor iron absorption   • Anemia due to chronic blood loss   • Intramural, submucous, and subserous leiomyoma of uterus   • Mirena   • Class 1 obesity due to excess calories with body mass index (BMI) of 33.0 to 33.9 in adult   • Acute nonintractable headache   • Fibrocystic breast disease        Past Medical History:   Diagnosis Date   • GERD (gastroesophageal reflux disease)     resolved w/ WLS   • History of kidney stones     most recent 17   • History of transfusion of 4 units of packed RBC 2019   • Intramural uterine fibroid    • Microcytic anemia         Past Surgical History:   Procedure Laterality Date   • CERVICAL CERCLAGE     • COLONOSCOPY     • D&C WITH SUCTION     • GASTRIC SLEEVE LAPAROSCOPIC N/A 2017    Procedure: GASTRIC SLEEVE LAPAROSCOPY   • KNEE MENISCECTOMY Right 2018   • LAPAROSCOPIC TUBAL LIGATION     • [place] LAP,ESOPHAGOGAST FUNDOPLASTY N/A 2017    Procedure: HIATAL HERNIA REPAIR LAPAROSCOPIC;  Surgeon: Eulalio Dick MD   • WISDOM TOOTH EXTRACTION         Visit Dx:     ICD-10-CM ICD-9-CM   1. Acute pain of left shoulder M25.512 719.41         Patient History     Row Name 19 1345             History    Chief Complaint  Difficulty with daily activities;Muscle tenderness;Muscle weakness;Pain  -MM      Type of Pain  Shoulder pain  -MM      Date Current Problem(s) Began  10/09/19  -MM      Brief Description of Current Complaint  Client presents with right shoulder pain that worsened after falling 2019.  She landed on her left side and injured her ankle and shoulder.   She had an x-ray which did not reveal any signs of fracture.  Follow-up visits included an MRI which was consistent with partial supraspinatus tear and superior labrum tear.  Skilled intervention is required to minimize pain and restore function.  -MM      Patient/Caregiver Goals  Relieve pain;Improve strength  -MM      Hand Dominance  right-handed  -MM      Occupation/sports/leisure activities  Employed at [employer] employee health  -MM      What clinical tests have you had for this problem?  MRI  -MM      Results of Clinical Tests  Partial supraspinatus tear, superior labral tear  -MM         Pain     Pain Location  Shoulder  -MM      Pain at Present  3  -MM      Pain at Best  2  -MM      Pain at Worst  4  -MM      Pain Frequency  Constant/continuous  -MM      Pain Description  Aching  -MM      Pain Comments  She has mild dull discomfort frequently.  She has some increase in pain with overhead activity.  -MM      Difficulties with ADL's?  lifting items overhead, carrying items  -MM         Fall Risk Assessment    Any falls in the past year:  Yes  -MM      Number of falls reported in the last 12 months  1  -MM      Factors that contributed to the fall:  Tripped  -MM         Daily Activities    Primary Language  English  -MM      Are you able to read  Yes  -MM      Are you able to write  Yes  -MM      Barriers to learning  None  -MM      Pt Participated in POC and Goals  Yes  -MM        User Key  (r) = Recorded By, (t) = Taken By, (c) = Cosigned By    Initials Name Provider Type    MM Ramón Turpin, PT Physical Therapist          PT Ortho     Row Name 12/04/19 2633       Precautions and Contraindications    Precautions/Limitations  no known precautions/limitations  -MM       Posture/Observations    Posture/Observations Comments  no abnormality noted on observation. Palpation: no specific tenderness, but pain noted lateral subacromial region and deep.   -MM       Special Tests/Palpation    Special  Tests/Palpation  Shoulder  -MM       Shoulder Impingement/Rotator Cuff Special Tests    Neer Impingement Test (RC Lesion vs. Bursitis)  Left:;Positive  -MM    Full Can Test (RC Lesion)  -- mild pain  -MM       Shoulder Laxity/Instability Special Tests    Shoulder Laxity/Instability Special Tests Comments  negative  -MM       Biceps/Labral Special Tests    Clunk Test (Labral Test)  Negative  -MM    Biceps/Labral Special Tests Comments  myoshear: negative  -MM       General ROM    LT Upper Ext  Lt Shoulder ABduction;Lt Shoulder Extension;Lt Shoulder Flexion;Lt Shoulder Internal Rotation;Lt Shoulder External Rotation  -MM       Left Upper Ext    Lt Shoulder Abduction AROM  normal into scaption  -MM    Lt Shoulder Extension AROM  55 degrees  -MM    Lt Shoulder Flexion AROM  140 degrees with some pain  -MM    Lt Shoulder External Rotation AROM  55 degrees with some pain  -MM    Lt Shoulder Internal Rotation AROM  L4 with some pain  -MM       MMT (Manual Muscle Testing)    Lt Upper Ext  Lt Shoulder Flexion;Lt Shoulder Extension;Lt Shoulder ABduction;Lt Shoulder Internal Rotation;Lt Shoulder External Rotation  -MM       MMT Left Upper Ext    Lt Shoulder Flexion MMT, Gross Movement  (4/5) good  -MM    Lt Shoulder Extension MMT, Gross Movement  (5/5) normal  -MM    Lt Shoulder ABduction MMT, Gross Movement  (4/5) good  -MM    Lt Shoulder Internal Rotation MMT, Gross Movement  (5/5) normal  -MM    Lt Shoulder External Rotation MMT, Gross Movement  (5/5) normal  -MM    Lt Upper Extremity Comments   some soreness with resisted abduction and flexion.  -MM      User Key  (r) = Recorded By, (t) = Taken By, (c) = Cosigned By    Initials Name Provider Type    MM Ramón Turpin, PT Physical Therapist      Therapy Education  Education Details: Guided and reviewed home program.  Home exercise included: Band resisted rows, low rows, horizontal abduction, shoulder internal rotation, external rotation, standing flexion to 90 with 2  pounds, and pendulums.  Given: HEP  Program: New  How Provided: Verbal  Provided to: Patient  Level of Understanding: Teach back education performed     PT OP Goals     Row Name 12/04/19 1345          PT Short Term Goals    STG Date to Achieve  12/25/19  -MM     STG 1  Client will report 90% improvement in left shoulder pain with daily activity.  -MM     STG 1 Progress  New  -MM     STG 2  Client will be independent with home program to minimize pain and improve overall function.  -MM     STG 2 Progress  New  -MM     STG 3  Left shoulder flexion active range of motion will improve to 155 degrees without pain.  -MM     STG 3 Progress  New  -MM     STG 4  Client will improve to lifting light items overhead without difficulty.  -MM     STG 4 Progress  New  -MM        Long Term Goals    LTG Date to Achieve  01/15/20  -MM     LTG 1  QuickDASH score will improve to 12% or better.  -MM     LTG 1 Progress  New  -MM     LTG 2  Left shoulder flexion strength will improve to 5/5.  -MM     LTG 2 Progress  New  -MM     LTG 3  Client will return to regular strengthening exercises at the gym without difficulty.  -MM     LTG 3 Progress  New  -MM        Time Calculation    PT Goal Re-Cert Due Date  03/03/20  -MM       User Key  (r) = Recorded By, (t) = Taken By, (c) = Cosigned By    Initials Name Provider Type    MM Ramón Turpin, PT Physical Therapist        PT Assessment/Plan     Row Name 12/04/19 1345          PT Assessment    Functional Limitations  Limitation in home management;Performance in leisure activities;Performance in self-care ADL  -MM     Impairments  Pain;Muscle strength;Range of motion  -MM     Assessment Comments  Client presents with evolving symptoms of low complexity. Signs and symptoms are consistent with left shoulder pain related to partial rotator cuff tear and superior labrum tear. Skilled intervention is required to improve pain and overall function.  -MM     Please refer to paper survey for additional  self-reported information  Yes  -MM     Rehab Potential  Good  -MM     Patient/caregiver participated in establishment of treatment plan and goals  Yes  -MM     Patient would benefit from skilled therapy intervention  Yes  -MM        PT Plan    PT Frequency  1x/week;2x/week  -MM     Predicted Duration of Therapy Intervention (Therapy Eval)  6-10 visits  -MM     Planned CPT's?  PT EVAL LOW COMPLEXITY: 40215;PT THER PROC EA 15 MIN: 80428;PT THER ACT EA 15 MIN: 25241;PT MANUAL THERAPY EA 15 MIN: 31490;PT HOT/COLD PACK WC NONMCARE: 91843;PT IONTOPHORESIS EA 15 MIN: 64950  -MM     PT Plan Comments  Continue per plan of treatment with scapular stabilization and mobility exercises. Also include low level rotator cuff strengthening.  Consider ASTYM.   -MM       User Key  (r) = Recorded By, (t) = Taken By, (c) = Cosigned By    Initials Name Provider Type    MM Ramón Turpin, PT Physical Therapist        Outcome Measure Options: Quick DASH  Quick DASH  Open a tight or new jar.: No Difficulty  Do heavy household chores (e.g., wash walls, wash floors): Mild Difficulty  Carry a shopping bag or briefcase: Mild Difficulty  Wash your back: Moderate Difficulty  Use a knife to cut food: No Difficulty  Recreational activities in which you take some force or impact through your arm, should or hand (e.g. golf, hammering, tennis, etc.): Moderate Difficulty  During the past week, to what extent has your arm, shoulder, or hand problem interfered with your normal social activites with family, friends, neighbors or groups?: Not at all  During the past week, were you limited in your work or other regular daily activities as a result of your arm, shoulder or hand problem?: Slightly Limited  Arm, Shoulder, or hand pain: Moderate  Tingling (pins and needles) in your arm, shoulder, or hand: None  During the past week, how much difficulty have you had sleeping because of the pain in your arm, shoulder or hand?: Mild Difficulty  Number of  Questions Answered: 11  Quick DASH Score: 22.73     Time Calculation:   Start Time: 1345     Therapy Charges for Today     Code Description Service Date Service Provider Modifiers Qty    05027544525 HC PT EVAL LOW COMPLEXITY 3 12/4/2019 Ramón Turpin, PT GP 1          PT G-Codes  Outcome Measure Options: Quick DASH  Quick DASH Score: 22.73         Ramón Turpin, PT  12/4/2019

## 2019-12-06 ENCOUNTER — HOSPITAL ENCOUNTER (OUTPATIENT)
Dept: PHYSICAL THERAPY | Facility: HOSPITAL | Age: 49
Setting detail: THERAPIES SERIES
Discharge: HOME | End: 2019-12-06

## 2019-12-06 DIAGNOSIS — M25.512 ACUTE PAIN OF LEFT SHOULDER: Primary | ICD-10-CM

## 2019-12-06 PROCEDURE — 97140 MANUAL THERAPY 1/> REGIONS: CPT

## 2019-12-06 PROCEDURE — 97110 THERAPEUTIC EXERCISES: CPT

## 2019-12-06 NOTE — THERAPY TREATMENT NOTE
Outpatient Physical Therapy Ortho Treatment Note  [facility]     Patient Name: Laure Valentin  : 1970  MRN: [id number]  Today's Date: 2019      Visit Date: 2019    Visit Dx:    ICD-10-CM ICD-9-CM   1. Acute pain of left shoulder M25.512 719.41     Patient Active Problem List   Diagnosis   • Heart murmur   • Iron deficiency anemia   • Annual GYN exam w/o problems   • Vitamin D insufficiency   • Knee pain, bilateral   • Poor iron absorption   • Anemia due to chronic blood loss   • Intramural, submucous, and subserous leiomyoma of uterus   • Mirena   • Class 1 obesity due to excess calories with body mass index (BMI) of 33.0 to 33.9 in adult   • Acute nonintractable headache   • Fibrocystic breast disease        Past Medical History:   Diagnosis Date   • GERD (gastroesophageal reflux disease)     resolved w/ WLS   • History of kidney stones     most recent 17   • History of transfusion of 4 units of packed RBC 2019   • Intramural uterine fibroid    • Microcytic anemia         Past Surgical History:   Procedure Laterality Date   • CERVICAL CERCLAGE     • COLONOSCOPY     • D&C WITH SUCTION     • GASTRIC SLEEVE LAPAROSCOPIC N/A 2017    Procedure: GASTRIC SLEEVE LAPAROSCOPY   • KNEE MENISCECTOMY Right 2018   • LAPAROSCOPIC TUBAL LIGATION     • [place] LAP,ESOPHAGOGAST FUNDOPLASTY N/A 2017    Procedure: HIATAL HERNIA REPAIR LAPAROSCOPIC;  Surgeon: Eulalio Dick MD   • WISDOM TOOTH EXTRACTION       PT Ortho     Row Name 19 1345       Precautions and Contraindications    Precautions/Limitations  no known precautions/limitations  -MM       Posture/Observations    Posture/Observations Comments  no abnormality noted on observation. Palpation: no specific tenderness, but pain noted lateral subacromial region and deep.   -MM       Special Tests/Palpation    Special Tests/Palpation  Shoulder  -MM       Shoulder Impingement/Rotator Cuff Special  Tests    Neer Impingement Test (RC Lesion vs. Bursitis)  Left:;Positive  -MM    Full Can Test (RC Lesion)  -- mild pain  -MM       Shoulder Laxity/Instability Special Tests    Shoulder Laxity/Instability Special Tests Comments  negative  -MM       Biceps/Labral Special Tests    Clunk Test (Labral Test)  Negative  -MM    Biceps/Labral Special Tests Comments  myoshear: negative  -MM       General ROM    LT Upper Ext  Lt Shoulder ABduction;Lt Shoulder Extension;Lt Shoulder Flexion;Lt Shoulder Internal Rotation;Lt Shoulder External Rotation  -MM       Left Upper Ext    Lt Shoulder Abduction AROM  normal into scaption  -MM    Lt Shoulder Extension AROM  55 degrees  -MM    Lt Shoulder Flexion AROM  140 degrees with some pain  -MM    Lt Shoulder External Rotation AROM  55 degrees with some pain  -MM    Lt Shoulder Internal Rotation AROM  L4 with some pain  -MM       MMT (Manual Muscle Testing)    Lt Upper Ext  Lt Shoulder Flexion;Lt Shoulder Extension;Lt Shoulder ABduction;Lt Shoulder Internal Rotation;Lt Shoulder External Rotation  -MM       MMT Left Upper Ext    Lt Shoulder Flexion MMT, Gross Movement  (4/5) good  -MM    Lt Shoulder Extension MMT, Gross Movement  (5/5) normal  -MM    Lt Shoulder ABduction MMT, Gross Movement  (4/5) good  -MM    Lt Shoulder Internal Rotation MMT, Gross Movement  (5/5) normal  -MM    Lt Shoulder External Rotation MMT, Gross Movement  (5/5) normal  -MM    Lt Upper Extremity Comments   some soreness with resisted abduction and flexion.  -MM      User Key  (r) = Recorded By, (t) = Taken By, (c) = Cosigned By    Initials Name Provider Type    MM Ramón Turpin, PT Physical Therapist          PT Assessment/Plan     Row Name 12/06/19 2035          PT Assessment    Assessment Comments  Mild soreness with exercises. Overall they were all tolerated very well. No complaints with ASTYM.  -MM        PT Plan    PT Plan Comments  Continue per plan of treatment with exercises and manual therapy.  -MM        User Key  (r) = Recorded By, (t) = Taken By, (c) = Cosigned By    Initials Name Provider Type    Ramón Alegria, PT Physical Therapist        OP Exercises     Row Name 12/06/19 0815 12/06/19 0800          Subjective Comments    Subjective Comments  --  No pain at rest today. She reports some soreness with resisted shoulder flexion/abduction.  -MM        Subjective Pain    Able to rate subjective pain?  --  yes  -MM     Pre-Treatment Pain Level  --  0  -MM     Post-Treatment Pain Level  --  0  -MM        Total Minutes    39952 - PT Therapeutic Exercise Minutes  --  30  -MM     80439 - PT Manual Therapy Minutes  15  -MM  --        Exercise 1    Exercise Name 1  --  CC: rows (4pl), sweeps (4pl), tricep push down (2 pl), adduction (4pl), IR (3pl), ER (2pl), punches (3pl)  -MM     Cueing 1  --  Verbal  -MM     Time 1  --  10 min  -MM        Exercise 2    Exercise Name 2  --  UBE  -MM     Time 2  --  3 min  -MM        Exercise 3    Exercise Name 3  --  wall slides with 1.5# cuff weight/ wall circles  -MM     Reps 3  --  10/10  -MM        Exercise 4    Exercise Name 4  --  DB flexion to 90/ abduction to 90/ robbery exercise   -MM     Reps 4  --  10/10/10/  -MM     Additional Comments  --  2 #  -MM        Exercise 5    Exercise Name 5  --  pendulums/cross arm stretch  -MM     Time 5  --  3 min  -MM       User Key  (r) = Recorded By, (t) = Taken By, (c) = Cosigned By    Initials Name Provider Type    Ramón Alegria, PT Physical Therapist                      Manual Rx (last 36 hours)      Manual Treatments     Row Name 12/06/19 0815             Total Minutes    16202 - PT Manual Therapy Minutes  15  -MM         Manual Rx 1    Manual Rx 1 Location  L shoulder  -MM      Manual Rx 1 Type   Provided soft tissue mobilization using ASTYM technique. Moderate pressure was used with ASTYM.   -MM      Manual Rx 1 Duration  15  -MM        User Key  (r) = Recorded By, (t) = Taken By, (c) = Cosigned By    Initials Name  Provider Type    MM Ramón Turpin, PT Physical Therapist        Time Calculation:   Start Time: 0815  Therapy Charges for Today     Code Description Service Date Service Provider Modifiers Qty    14652446609  PT THER PROC EA 15 MIN 12/6/2019 Ramón Turpin, PT GP 2    [id number]  PT MANUAL THERAPY EA 15 MIN 12/6/2019 Ramón Turpin, PT GP 1      Ramón Turpin, PT  12/6/2019

## 2019-12-10 ENCOUNTER — HOSPITAL ENCOUNTER (OUTPATIENT)
Dept: PHYSICAL THERAPY | Facility: HOSPITAL | Age: 49
Setting detail: THERAPIES SERIES
Discharge: HOME | End: 2019-12-10

## 2019-12-10 DIAGNOSIS — M25.512 ACUTE PAIN OF LEFT SHOULDER: Primary | ICD-10-CM

## 2019-12-10 PROCEDURE — 97110 THERAPEUTIC EXERCISES: CPT | Performed by: PHYSICAL THERAPIST

## 2019-12-10 PROCEDURE — 97140 MANUAL THERAPY 1/> REGIONS: CPT | Performed by: PHYSICAL THERAPIST

## 2019-12-10 NOTE — THERAPY TREATMENT NOTE
Outpatient Physical Therapy Ortho Treatment Note  [facility]     Patient Name: Laure Valentin  : 1970  MRN: [id number]  Today's Date: 12/10/2019      Visit Date: 12/10/2019    Visit Dx:    ICD-10-CM ICD-9-CM   1. Acute pain of left shoulder M25.512 719.41       Patient Active Problem List   Diagnosis   • Heart murmur   • Iron deficiency anemia   • Annual GYN exam w/o problems   • Vitamin D insufficiency   • Knee pain, bilateral   • Poor iron absorption   • Anemia due to chronic blood loss   • Intramural, submucous, and subserous leiomyoma of uterus   • Mirena   • Class 1 obesity due to excess calories with body mass index (BMI) of 33.0 to 33.9 in adult   • Acute nonintractable headache   • Fibrocystic breast disease        Past Medical History:   Diagnosis Date   • GERD (gastroesophageal reflux disease)     resolved w/ WLS   • History of kidney stones     most recent 17   • History of transfusion of 4 units of packed RBC 2019   • Intramural uterine fibroid    • Microcytic anemia         Past Surgical History:   Procedure Laterality Date   • CERVICAL CERCLAGE     • COLONOSCOPY     • D&C WITH SUCTION     • GASTRIC SLEEVE LAPAROSCOPIC N/A 2017    Procedure: GASTRIC SLEEVE LAPAROSCOPY   • KNEE MENISCECTOMY Right 2018   • LAPAROSCOPIC TUBAL LIGATION     • [place] LAP,ESOPHAGOGAST FUNDOPLASTY N/A 2017    Procedure: HIATAL HERNIA REPAIR LAPAROSCOPIC;  Surgeon: Eulalio Dick MD   • WISDOM TOOTH EXTRACTION                         PT Assessment/Plan     Row Name 12/10/19 1614          PT Assessment    Assessment Comments  Client fatigues within session, however completed without complication.  Cont. with current PT POC.   -CR        PT Plan    PT Plan Comments  Continue with current PT POC  -CR       User Key  (r) = Recorded By, (t) = Taken By, (c) = Cosigned By    Initials Name Provider Type    Tiburcio Holt, PT Physical Therapist            OP  Exercises     Row Name 12/10/19 1600 12/10/19 1500          Subjective Comments    Subjective Comments  Reports mild soreness when reaching behind back  -CR  --        Subjective Pain    Able to rate subjective pain?  yes  -CR  --     Pre-Treatment Pain Level  0  -CR  --     Post-Treatment Pain Level  0  -CR  --        Total Minutes    40511 - PT Therapeutic Exercise Minutes  30  -CR  --     61816 - PT Manual Therapy Minutes  --  10  -CR        Exercise 1    Exercise Name 1  CC: rows (4pl), sweeps (4pl), tricep push down (2 pl), adduction (4pl), IR (3pl), ER (2pl), punches (3pl)  -CR  --     Cueing 1  Verbal  -CR  --     Time 1  10 min  -CR  --        Exercise 2    Exercise Name 2  UBE  -CR  --     Time 2  3 min  -CR  --        Exercise 3    Exercise Name 3  wall slides with 1.5# cuff weight/ wall circles  -CR  --     Reps 3  10/10  -CR  --        Exercise 4    Exercise Name 4  DB flexion to 90/ abduction to 90/ robbery exercise   -CR  --     Reps 4  10/10/10  -CR  --     Additional Comments  3#  -CR  --        Exercise 5    Exercise Name 5  pendulums/cross arm stretch  -CR  --     Time 5  3 min  -CR  --       User Key  (r) = Recorded By, (t) = Taken By, (c) = Cosigned By    Initials Name Provider Type    Tiburcio Holt PT Physical Therapist                      Manual Rx (last 36 hours)      Manual Treatments     Row Name 12/10/19 1500             Total Minutes    65412 - PT Manual Therapy Minutes  10  -CR         Manual Rx 1    Manual Rx 1 Location  L shoulder  -CR      Manual Rx 1 Type  PROM, stretching  -CR      Manual Rx 1 Duration  10  -CR        User Key  (r) = Recorded By, (t) = Taken By, (c) = Cosigned By    Initials Name Provider Type    Tiburcio Holt PT Physical Therapist                             Time Calculation:   Start Time: 1445  Therapy Charges for Today     Code Description Service Date Service Provider Modifiers Qty    55913117070  PT THER PROC EA 15 MIN 12/10/2019 Traci  Tiburcio, PT GP 2    [id number]  PT MANUAL THERAPY EA 15 MIN 12/10/2019 Tiburcio Aviles, PT GP 1                    Tiburcio Aviles, PT  12/10/2019

## 2019-12-11 ENCOUNTER — OFFICE VISIT (OUTPATIENT)
Dept: OBSTETRICS AND GYNECOLOGY | Facility: CLINIC | Age: 49
End: 2019-12-11

## 2019-12-11 VITALS — RESPIRATION RATE: 14 BRPM | BODY MASS INDEX: 31.89 KG/M2 | WEIGHT: 180 LBS

## 2019-12-11 DIAGNOSIS — D25.2 INTRAMURAL, SUBMUCOUS, AND SUBSEROUS LEIOMYOMA OF UTERUS: Primary | ICD-10-CM

## 2019-12-11 DIAGNOSIS — N92.1 METRORRHAGIA: ICD-10-CM

## 2019-12-11 DIAGNOSIS — D25.0 INTRAMURAL, SUBMUCOUS, AND SUBSEROUS LEIOMYOMA OF UTERUS: Primary | ICD-10-CM

## 2019-12-11 DIAGNOSIS — D25.1 INTRAMURAL, SUBMUCOUS, AND SUBSEROUS LEIOMYOMA OF UTERUS: Primary | ICD-10-CM

## 2019-12-11 PROBLEM — N60.19 FIBROCYSTIC BREAST DISEASE: Status: RESOLVED | Noted: 2019-08-12 | Resolved: 2019-12-11

## 2019-12-11 PROBLEM — K90.89 POOR IRON ABSORPTION: Status: RESOLVED | Noted: 2018-09-28 | Resolved: 2019-12-11

## 2019-12-11 PROBLEM — D50.9 IRON DEFICIENCY ANEMIA: Status: RESOLVED | Noted: 2017-10-04 | Resolved: 2019-12-11

## 2019-12-11 PROBLEM — D50.0 ANEMIA DUE TO CHRONIC BLOOD LOSS: Status: RESOLVED | Noted: 2019-05-30 | Resolved: 2019-12-11

## 2019-12-11 PROCEDURE — 99213 OFFICE O/P EST LOW 20 MIN: CPT | Performed by: OBSTETRICS & GYNECOLOGY

## 2019-12-11 NOTE — PROGRESS NOTES
Subjective   Chief Complaint   Patient presents with   • Follow-up     bleeding     Laure Valentin is a 48 y.o. year old .  No LMP recorded.  She presents to be seen because of follow-up to discuss her bleeding.  She had the Mirena placed 2019.  Since then her cycle has been significantly lighter.  There have been times when she goes without a cycle.  There are times that she spots unpredictably.  The spotting is never heavy.  When she does spot she will take the Aygestin and that tends to resolve the issue.  Overall as compared to prior to IUD placement the bleeding is about 85% better overall.  She is happy with how things are and is comfortable at this level if things did not improve beyond this.    The following portions of the patient's history were reviewed and updated as appropriate:current medications and allergies    Social History    Tobacco Use      Smoking status: Never Smoker      Smokeless tobacco: Never Used         Objective   Resp 14   Wt 81.6 kg (180 lb)   Breastfeeding No   BMI 31.89 kg/m²     Lab Review   No data reviewed    Imaging   No data reviewed        Assessment   1. History of metrorrhagia with iron deficiency anemia resolved with IUD placement and iron supplementation  2. Irregular menses with IUD.  Prior work-up for etiology unremarkable.  Symptoms well controlled with combination of Mirena and Aygestin on as-needed basis     Plan   1. The importance of keeping all planned follow-up and taking all medications as prescribed was emphasized.  2. Follow up for annual exam 3/2020     New Medications Ordered This Visit   Medications   • norethindrone (AYGESTIN) 5 MG tablet     Sig: Take 1 tablet by mouth 2 (Two) Times a Day As Needed (bleeding) for up to 39 doses.     Dispense:  30 tablet     Refill:  2          This note was electronically signed.    Maged Marcelo M.D.  2019    Total time spent today with Laure  was 20 minutes (level 3).  Off this  time, 90% was spent face-to-face time coordinating care, answering her questions and counseling regarding pathophysiology of her presenting problem along with plans for any diagnositc work-up and treatment.    Note: Speech recognition transcription software may have been used to create portions of this document.  An attempt at proofreading has been made but errors in transcription could still be present.

## 2019-12-12 ENCOUNTER — HOSPITAL ENCOUNTER (OUTPATIENT)
Dept: PHYSICAL THERAPY | Facility: HOSPITAL | Age: 49
Setting detail: THERAPIES SERIES
Discharge: HOME | End: 2019-12-12

## 2019-12-12 DIAGNOSIS — M25.512 ACUTE PAIN OF LEFT SHOULDER: Primary | ICD-10-CM

## 2019-12-12 PROCEDURE — 97110 THERAPEUTIC EXERCISES: CPT

## 2019-12-12 PROCEDURE — 97140 MANUAL THERAPY 1/> REGIONS: CPT

## 2019-12-12 NOTE — THERAPY TREATMENT NOTE
Outpatient Physical Therapy Ortho Treatment Note  [facility]     Patient Name: Laure Valentin  : 1970  MRN: [id number]  Today's Date: 2019      Visit Date: 2019    Visit Dx:    ICD-10-CM ICD-9-CM   1. Acute pain of left shoulder M25.512 719.41       Patient Active Problem List   Diagnosis   • Heart murmur   • Annual GYN exam w/o problems   • Vitamin D insufficiency   • Knee pain, bilateral   • Intramural, submucous, and subserous leiomyoma of uterus   • Mirena   • Class 1 obesity due to excess calories with body mass index (BMI) of 33.0 to 33.9 in adult   • Acute nonintractable headache        Past Medical History:   Diagnosis Date   • GERD (gastroesophageal reflux disease)     resolved w/ WLS   • History of kidney stones     most recent 17   • History of transfusion of 4 units of packed RBC 2019   • Intramural uterine fibroid    • Iron deficiency anemia 10/4/2017    -Has had colonoscopy.  -Gastric sleeve 2018 but patient states anemia predates her bariatric surgery -Requiring iron infusions last year.  -8/10/2018 h/h , iron 20.  -Ferrous sulfate 325 mg TID, repeat labs 1 month.    • Microcytic anemia         Past Surgical History:   Procedure Laterality Date   • CERVICAL CERCLAGE     • COLONOSCOPY     • D&C WITH SUCTION     • GASTRIC SLEEVE LAPAROSCOPIC N/A 2017    Procedure: GASTRIC SLEEVE LAPAROSCOPY   • KNEE MENISCECTOMY Right 2018   • LAPAROSCOPIC TUBAL LIGATION     • [place] LAP,ESOPHAGOGAST FUNDOPLASTY N/A 2017    Procedure: HIATAL HERNIA REPAIR LAPAROSCOPIC;  Surgeon: Eulalio Dick MD   • WISDOM TOOTH EXTRACTION         PT Assessment/Plan     Row Name 19 2168          PT Assessment    Assessment Comments  Exercises were tolerated well. She has mild pain with overhead activity. No complaints with AStYM.   -MM        PT Plan    PT Plan Comments  Continue per plan of treatment with exercises and manual therapy.   -MM        User Key  (r) = Recorded By, (t) = Taken By, (c) = Cosigned By    Initials Name Provider Type    Ramón Alegria, PT Physical Therapist            OP Exercises     Row Name 12/12/19 0930             Subjective Comments    Subjective Comments  Client reports    -MM         Subjective Pain    Able to rate subjective pain?  yes  -MM      Pre-Treatment Pain Level  0  -MM      Post-Treatment Pain Level  0  -MM         Total Minutes    36042 - PT Therapeutic Exercise Minutes  30  -MM      37082 - PT Manual Therapy Minutes  15  -MM         Exercise 1    Exercise Name 1  CC: rows (4pl), sweeps (4pl), tricep push down (2 pl), adduction (4pl), IR (3pl), ER (2pl), punches (3pl)  -MM      Cueing 1  Verbal  -MM      Time 1  10 min  -MM         Exercise 2    Exercise Name 2  UBE  -MM      Time 2  3 min  -MM         Exercise 3    Exercise Name 3  small range wall slides with band.   -MM      Reps 3  8  -MM         Exercise 4    Exercise Name 4  DB flexion to 90/ abduction to 90/ robbery exercise   -MM      Reps 4  10/10/10  -MM      Additional Comments  2#  -MM         Exercise 5    Exercise Name 5  pendulums/cross arm stretch  -MM      Time 5  3 min  -MM        User Key  (r) = Recorded By, (t) = Taken By, (c) = Cosigned By    Initials Name Provider Type    Ramón Alegria, PT Physical Therapist           Manual Rx (last 36 hours)      Manual Treatments     Row Name 12/12/19 0930             Total Minutes    90633 - PT Manual Therapy Minutes  15  -MM         Manual Rx 1    Manual Rx 1 Location  L shoulder  -MM      Manual Rx 1 Type   Provided soft tissue mobilization using ASTYM technique. Moderate pressure was used with ASTYM.   -MM      Manual Rx 1 Duration  15  -MM        User Key  (r) = Recorded By, (t) = Taken By, (c) = Cosigned By    Initials Name Provider Type    Ramón Alegria, PT Physical Therapist        Time Calculation:   Start Time: 0930  Therapy Charges for Today     Code Description Service Date  Service Provider Modifiers Qty    10966545681  PT THER PROC EA 15 MIN 12/12/2019 Ramón Turpin, PT GP 2    [id number]  PT MANUAL THERAPY EA 15 MIN 12/12/2019 Ramón Turpin, PT GP 1          Ramón Turpin, PT  12/12/2019

## 2019-12-16 ENCOUNTER — HOSPITAL ENCOUNTER (OUTPATIENT)
Dept: PHYSICAL THERAPY | Facility: HOSPITAL | Age: 49
Setting detail: THERAPIES SERIES
Discharge: HOME | End: 2019-12-16

## 2019-12-16 DIAGNOSIS — M25.512 ACUTE PAIN OF LEFT SHOULDER: Primary | ICD-10-CM

## 2019-12-16 PROCEDURE — 97110 THERAPEUTIC EXERCISES: CPT

## 2019-12-16 PROCEDURE — 97140 MANUAL THERAPY 1/> REGIONS: CPT

## 2019-12-16 NOTE — THERAPY TREATMENT NOTE
Outpatient Physical Therapy Ortho Treatment Note  [facility]     Patient Name: Laure Valentin  : 1970  MRN: [id number]  Today's Date: 2019      Visit Date: 2019    Visit Dx:    ICD-10-CM ICD-9-CM   1. Acute pain of left shoulder M25.512 719.41       Patient Active Problem List   Diagnosis   • Heart murmur   • Annual GYN exam w/o problems   • Vitamin D insufficiency   • Knee pain, bilateral   • Intramural, submucous, and subserous leiomyoma of uterus   • Mirena   • Class 1 obesity due to excess calories with body mass index (BMI) of 33.0 to 33.9 in adult   • Acute nonintractable headache        Past Medical History:   Diagnosis Date   • GERD (gastroesophageal reflux disease)     resolved w/ WLS   • History of kidney stones     most recent 17   • History of transfusion of 4 units of packed RBC 2019   • Intramural uterine fibroid    • Iron deficiency anemia 10/4/2017    -Has had colonoscopy.  -Gastric sleeve 2018 but patient states anemia predates her bariatric surgery -Requiring iron infusions last year.  -8/10/2018 h/h , iron 20.  -Ferrous sulfate 325 mg TID, repeat labs 1 month.    • Microcytic anemia         Past Surgical History:   Procedure Laterality Date   • CERVICAL CERCLAGE     • COLONOSCOPY     • D&C WITH SUCTION     • GASTRIC SLEEVE LAPAROSCOPIC N/A 2017    Procedure: GASTRIC SLEEVE LAPAROSCOPY   • KNEE MENISCECTOMY Right 2018   • LAPAROSCOPIC TUBAL LIGATION     • [place] LAP,ESOPHAGOGAST FUNDOPLASTY N/A 2017    Procedure: HIATAL HERNIA REPAIR LAPAROSCOPIC;  Surgeon: Eulalio Dick MD   • WISDOM TOOTH EXTRACTION                         PT Assessment/Plan     Row Name 19 4330          PT Assessment    Assessment Comments  Client reports some improvement with tolerance to activity overall. She reports some relief with ASTYM. No complaints with exercises.   -MM        PT Plan    PT Plan Comments  Continue per plan of  treatment with exercises and manual therapy.  -MM       User Key  (r) = Recorded By, (t) = Taken By, (c) = Cosigned By    Initials Name Provider Type    Ramón Alegria, PT Physical Therapist            OP Exercises     Row Name 12/16/19 0730             Subjective Comments    Subjective Comments  Client reports no pain at rest today. She notes that reaching overhead and reaching behind her back are improving.  -MM         Subjective Pain    Able to rate subjective pain?  yes  -MM      Pre-Treatment Pain Level  0  -MM      Post-Treatment Pain Level  0  -MM         Total Minutes    20514 - PT Therapeutic Exercise Minutes  30  -MM      30790 - PT Manual Therapy Minutes  15  -MM         Exercise 1    Exercise Name 1  CC: rows (4pl), sweeps (4pl), tricep push down (2 pl), adduction (4pl), IR (3pl), ER (red band), punches (3pl), chest fly (3 pl)  -MM      Cueing 1  Verbal  -MM      Time 1  12 min  -MM         Exercise 2    Exercise Name 2  UBE  -MM      Time 2  3 min  -MM         Exercise 3    Exercise Name 3  alphabet with band on wall/small range  -MM      Time 3  2 min  -MM         Exercise 4    Exercise Name 4  DB flexion to 90/ abduction to 90  -MM      Reps 4  10/10  -MM      Additional Comments  2#  -MM         Exercise 5    Exercise Name 5  pendulums/cross arm stretch  -MM      Time 5  3 min  -MM        User Key  (r) = Recorded By, (t) = Taken By, (c) = Cosigned By    Initials Name Provider Type    Ramón Alegria, PT Physical Therapist                      Manual Rx (last 36 hours)      Manual Treatments     Row Name 12/16/19 0730             Total Minutes    35167 - PT Manual Therapy Minutes  15  -MM         Manual Rx 1    Manual Rx 1 Location  L shoulder  -MM      Manual Rx 1 Type   Provided soft tissue mobilization using ASTYM technique. Moderate pressure was used with ASTYM.   -MM      Manual Rx 1 Duration  15  -MM        User Key  (r) = Recorded By, (t) = Taken By, (c) = Cosigned By    Initials  Name Provider Type     Ramón Turpin, PT Physical Therapist                             Time Calculation:   Start Time: 0730  Therapy Charges for Today     Code Description Service Date Service Provider Modifiers Qty    16593695794  PT THER PROC EA 15 MIN 12/16/2019 Ramón Turpin, PT GP 2    [id number]  PT MANUAL THERAPY EA 15 MIN 12/16/2019 Ramón Turpin, PT GP 1                    Ramón Turpin, PT  12/16/2019

## 2019-12-18 ENCOUNTER — HOSPITAL ENCOUNTER (OUTPATIENT)
Dept: PHYSICAL THERAPY | Facility: HOSPITAL | Age: 49
Setting detail: THERAPIES SERIES
Discharge: HOME | End: 2019-12-18

## 2019-12-18 DIAGNOSIS — M25.512 ACUTE PAIN OF LEFT SHOULDER: Primary | ICD-10-CM

## 2019-12-18 PROCEDURE — 97140 MANUAL THERAPY 1/> REGIONS: CPT

## 2019-12-18 PROCEDURE — 97110 THERAPEUTIC EXERCISES: CPT

## 2019-12-18 NOTE — THERAPY TREATMENT NOTE
Outpatient Physical Therapy Ortho Treatment Note  [facility]     Patient Name: Laure Valentin  : 1970  MRN: [id number]  Today's Date: 2019      Visit Date: 2019    Visit Dx:    ICD-10-CM ICD-9-CM   1. Acute pain of left shoulder M25.512 719.41     Patient Active Problem List   Diagnosis   • Heart murmur   • Annual GYN exam w/o problems   • Vitamin D insufficiency   • Knee pain, bilateral   • Intramural, submucous, and subserous leiomyoma of uterus   • Mirena   • Class 1 obesity due to excess calories with body mass index (BMI) of 33.0 to 33.9 in adult   • Acute nonintractable headache     Past Medical History:   Diagnosis Date   • GERD (gastroesophageal reflux disease)     resolved w/ WLS   • History of kidney stones     most recent 17   • History of transfusion of 4 units of packed RBC 2019   • Intramural uterine fibroid    • Iron deficiency anemia 10/4/2017    -Has had colonoscopy.  -Gastric sleeve 2018 but patient states anemia predates her bariatric surgery -Requiring iron infusions last year.  -8/10/2018 h/h , iron 20.  -Ferrous sulfate 325 mg TID, repeat labs 1 month.    • Microcytic anemia      Past Surgical History:   Procedure Laterality Date   • CERVICAL CERCLAGE     • COLONOSCOPY     • D&C WITH SUCTION     • GASTRIC SLEEVE LAPAROSCOPIC N/A 2017    Procedure: GASTRIC SLEEVE LAPAROSCOPY   • KNEE MENISCECTOMY Right 2018   • LAPAROSCOPIC TUBAL LIGATION     • [place] LAP,ESOPHAGOGAST FUNDOPLASTY N/A 2017    Procedure: HIATAL HERNIA REPAIR LAPAROSCOPIC;  Surgeon: Eulalio Dick MD   • WISDOM TOOTH EXTRACTION       PT Assessment/Plan     Row Name 19 1545          PT Assessment    Assessment Comments  Overall pain and tolerance to activity are improving. She is able to lift light weights overhead now without difficulty. She is unable to put weight on her arm for push-ups and she has difficulty with moderate weight  overhead.  -MM        PT Plan    PT Plan Comments  Continue per plan of treatment with ASTYM and exercises.   -MM       User Key  (r) = Recorded By, (t) = Taken By, (c) = Cosigned By    Initials Name Provider Type    Ramón Alegria, PT Physical Therapist        OP Exercises     Row Name 12/18/19 1545 12/18/19 1531          Subjective Comments    Subjective Comments  --  Client reports no pain today.  -MM        Subjective Pain    Able to rate subjective pain?  --  yes  -MM     Pre-Treatment Pain Level  --  0  -MM     Post-Treatment Pain Level  --  0  -MM        Total Minutes    72519 - PT Therapeutic Exercise Minutes  --  30  -MM     77292 - PT Manual Therapy Minutes  15  -MM  --        Exercise 1    Exercise Name 1  --  CC: rows (4pl), sweeps (4pl), tricep push down (2 pl), adduction (4pl), IR (3pl), ER (red band), punches (3pl), chest fly (3 pl)  -MM     Cueing 1  --  Verbal  -MM     Time 1  --  12 min  -MM        Exercise 2    Exercise Name 2  --  UBE  -MM     Time 2  --  3 min  -MM        Exercise 3    Exercise Name 3  --  alphabet with band on wall/small range  -MM     Time 3  --  2 min  -MM        Exercise 4    Exercise Name 4  --  DB flexion to 90/ abduction to 90  -MM     Reps 4  --  10/10  -MM        Exercise 5    Exercise Name 5  --  pendulums/cross arm stretch  -MM     Time 5  --  3 min  -MM       User Key  (r) = Recorded By, (t) = Taken By, (c) = Cosigned By    Initials Name Provider Type    Ramón Alegria, PT Physical Therapist            Manual Rx (last 36 hours)      Manual Treatments     Row Name 12/18/19 1545             Total Minutes    30633 - PT Manual Therapy Minutes  15  -MM         Manual Rx 1    Manual Rx 1 Location  L shoulder  -MM      Manual Rx 1 Type   Provided soft tissue mobilization using ASTYM technique. Moderate pressure was used with ASTYM.   -MM      Manual Rx 1 Duration  15  -MM        User Key  (r) = Recorded By, (t) = Taken By, (c) = Cosigned By    Initials Name  Provider Type    MM Ramón Turpin, PT Physical Therapist        Time Calculation:   Start Time: 1531  Therapy Charges for Today     Code Description Service Date Service Provider Modifiers Qty    [id number]  PT THER PROC EA 15 MIN 12/18/2019 Ramón Turpin, PT GP 2    [id number]  PT MANUAL THERAPY EA 15 MIN 12/18/2019 Ramón Turpin, PT GP 1          Ramón Turpin, PT  12/18/2019

## 2019-12-19 ENCOUNTER — OFFICE VISIT (OUTPATIENT)
Dept: OBSTETRICS AND GYNECOLOGY | Facility: CLINIC | Age: 49
End: 2019-12-19

## 2019-12-19 VITALS
SYSTOLIC BLOOD PRESSURE: 118 MMHG | BODY MASS INDEX: 31.89 KG/M2 | WEIGHT: 180 LBS | RESPIRATION RATE: 14 BRPM | DIASTOLIC BLOOD PRESSURE: 72 MMHG

## 2019-12-19 DIAGNOSIS — Z01.419 WELL WOMAN EXAM WITH ROUTINE GYNECOLOGICAL EXAM: Primary | ICD-10-CM

## 2019-12-19 DIAGNOSIS — Z12.11 SCREEN FOR COLON CANCER: ICD-10-CM

## 2019-12-19 PROBLEM — D25.2 INTRAMURAL, SUBMUCOUS, AND SUBSEROUS LEIOMYOMA OF UTERUS: Status: RESOLVED | Noted: 2019-05-30 | Resolved: 2019-12-19

## 2019-12-19 PROBLEM — D25.0 INTRAMURAL, SUBMUCOUS, AND SUBSEROUS LEIOMYOMA OF UTERUS: Status: RESOLVED | Noted: 2019-05-30 | Resolved: 2019-12-19

## 2019-12-19 PROBLEM — D25.1 INTRAMURAL, SUBMUCOUS, AND SUBSEROUS LEIOMYOMA OF UTERUS: Status: RESOLVED | Noted: 2019-05-30 | Resolved: 2019-12-19

## 2019-12-19 PROBLEM — R51.9 ACUTE NONINTRACTABLE HEADACHE: Status: RESOLVED | Noted: 2019-08-09 | Resolved: 2019-12-19

## 2019-12-19 PROCEDURE — 99396 PREV VISIT EST AGE 40-64: CPT | Performed by: OBSTETRICS & GYNECOLOGY

## 2019-12-19 NOTE — PROGRESS NOTES
Subjective   Chief Complaint   Patient presents with   • Gynecologic Exam     Laure Valentin is a 48 y.o. year old  presenting to be seen for her annual exam.  She was most recently here to talk about abnormal bleeding with the Mirena.  Ultrasound was done and was normal.  She was prescribed Aygestin after a couple of days of use the abnormal bleeding did seem to stop.    SEXUAL Hx:  She is currently sexually active.  In the past year there there has been NO new sexual partners.    Condoms are never used.  She would not like to be screened for STD's at today's exam.  Current birth control method: tubal ligation.  She is happy with her current method of contraception and does not want to discuss alternative methods of contraception.  MENSTRUAL Hx:  No LMP recorded (lmp unknown).  In the past 6 months her cycles have been irregular infrequent.  Her menstrual flow is typically light.   Each month on average there are roughly 0 day(s) of very heavy flow.  Intermenstrual bleeding is absent.    Post-coital bleeding is absent.  Dysmenorrhea: is not affecting her activities of daily living  PMS: is not affecting her activities of daily living  Her cycles are not a source of concern for her that she wishes to discuss today.  HEALTH Hx:  She exercises regularly: yes.  She wears her seat belt: yes.  She has concerns about domestic violence: no.  OTHER THINGS SHE WANTS TO DISCUSS TODAY:  Nothing else    The following portions of the patient's history were reviewed and updated as appropriate:problem list, current medications, allergies, past family history, past medical history, past social history and past surgical history.    Social History    Tobacco Use      Smoking status: Never Smoker      Smokeless tobacco: Never Used    Review of Systems  Constitutional POS: nothing reported    NEG: anorexia or night sweats   Genitourinary POS: nothing reported    NEG: dysuria or hematuria      Gastointestinal POS: nothing  reported    NEG: bloating, change in bowel habits, melena or reflux symptoms   Integument POS: nothing reported    NEG: moles that are changing in size, shape, color or rashes   Breast POS: nothing reported    NEG: persistent breast lump, skin dimpling or nipple discharge        Objective   /72   Resp 14   Wt 81.6 kg (180 lb)   LMP  (LMP Unknown)   Breastfeeding No   BMI 31.89 kg/m²     General:  well developed; well nourished  no acute distress   Skin:  No suspicious lesions seen   Thyroid: normal to inspection and palpation   Breasts:  Examined in supine position  Symmetric without masses or skin dimpling  Nipples normal without inversion, lesions or discharge  There are no palpable axillary nodes   Abdomen: soft, non-tender; no masses  no umbilical or inguinal hernias are present  no hepato-splenomegaly   Pelvis: Clinical staff was present for exam  External genitalia:  normal appearance of the external genitalia including Bartholin's and Sage's glands.  :  urethral meatus normal;  Vaginal:  normal pink mucosa without prolapse or lesions.  Cervix:  normal appearance. IUD string present - 1.5 cms in length;  Uterus:  normal size, shape and consistency. anteverted;  Adnexa:  non palpable bilaterally.  Rectal:  digital rectal exam not performed; anus visually normal appearing.        Assessment   1. Normal GYN exam  2. Prior history of metrorrhagia improved with Mirena use  3. Irregular menses with Mirena.  Ultrasound confirms normal placement.  Bleeding patterns improving with norethindrone.     Plan   1. Pap was not done today.  I explained to Laure that the recommendations for Pap smear interval in a low risk patient has lengthened to 3 years time.  I told Laure she still needs to be seen in our office yearly for a full physical including breast and pelvic exam.  2. She was encouraged to get yearly mammograms.  She should report any palpable breast lump(s) or skin changes regardless of mammographic  findings.  I explained to Laure that notification regarding her mammogram results will come from the center performing the study.  Our office will not be routinely calling with mammogram results.  It is her responsibility to make sure that the results from the mammogram are communicated to her by the breast center.  If she has any questions about the results, she is welcome to call our office anytime.  3. Colonoscopy was recommended for screening for colon cancer.  The procedure was briefly discussed and its benefits for early detection of colon cancer were emphasized.  I explained to Laure that we could help her to schedule it if she wishes.  Additionally, she could also contact her primary care physician to help make this arrangement.  Alternatively, she could consider Cologuard (which would need to be done every 3 years).  If Cologuard was abnormal, colonoscopy would be required in follow-up.  After considering these options she wants help setting up her colonoscopy.  Referral will be made to Dr. Sullivan for outpatient colonoscopy.  4. The importance of keeping all planned follow-up and taking all medications as prescribed was emphasized.  5. Follow up for annual exam 1 year         This note was electronically signed.    Maged Marcelo M.D.  December 19, 2019    Note: Speech recognition transcription software may have been used to create portions of this document.  An attempt at proofreading has been made but errors in transcription could still be present.

## 2019-12-23 ENCOUNTER — HOSPITAL ENCOUNTER (OUTPATIENT)
Dept: PHYSICAL THERAPY | Facility: HOSPITAL | Age: 49
Setting detail: THERAPIES SERIES
Discharge: HOME | End: 2019-12-23

## 2019-12-23 DIAGNOSIS — M25.512 ACUTE PAIN OF LEFT SHOULDER: Primary | ICD-10-CM

## 2019-12-23 PROCEDURE — 97140 MANUAL THERAPY 1/> REGIONS: CPT

## 2019-12-23 PROCEDURE — 97110 THERAPEUTIC EXERCISES: CPT

## 2019-12-23 NOTE — THERAPY TREATMENT NOTE
Outpatient Physical Therapy Ortho Treatment Note   [place]     Patient Name: Laure Valentin  : 1970  MRN: [id number]  Today's Date: 2019      Visit Date: 2019    Visit Dx:    ICD-10-CM ICD-9-CM   1. Acute pain of left shoulder M25.512 719.41       Patient Active Problem List   Diagnosis   • Annual GYN exam w/o problems   • Vitamin D insufficiency   • Knee pain, bilateral   • Mirena   • Class 1 obesity due to excess calories with body mass index (BMI) of 33.0 to 33.9 in adult        Past Medical History:   Diagnosis Date   • GERD (gastroesophageal reflux disease)     resolved w/ WLS   • History of kidney stones     most recent 17   • History of transfusion of 4 units of packed RBC 2019   • Intramural uterine fibroid    • Iron deficiency anemia 10/4/2017    -Has had colonoscopy.  -Gastric sleeve 2018 but patient states anemia predates her bariatric surgery -Requiring iron infusions last year.  -8/10/2018 h/h , iron 20.  -Ferrous sulfate 325 mg TID, repeat labs 1 month.    • Microcytic anemia         Past Surgical History:   Procedure Laterality Date   • CERVICAL CERCLAGE     • COLONOSCOPY     • D&C WITH SUCTION     • GASTRIC SLEEVE LAPAROSCOPIC N/A 2017    Procedure: GASTRIC SLEEVE LAPAROSCOPY   • KNEE MENISCECTOMY Right 2018   • LAPAROSCOPIC TUBAL LIGATION     • [place] LAP,ESOPHAGOGAST FUNDOPLASTY N/A 2017    Procedure: HIATAL HERNIA REPAIR LAPAROSCOPIC;  Surgeon: Eulalio Dick MD   • WISDOM TOOTH EXTRACTION         PT Assessment/Plan     Row Name 19 6328          PT Assessment    Assessment Comments  Pain with activity is improving. Some pinching type pain is noted at times with overhead activity. No complaints with exercises today.   -MM        PT Plan    PT Plan Comments  Continue per plan of treatment and re-exam next visit.   -MM       User Key  (r) = Recorded By, (t) = Taken By, (c) = Cosigned By    Initials Name  Provider Type    Ramón Alegria, PT Physical Therapist        OP Exercises     Row Name 12/23/19 0800 12/23/19 0730          Subjective Comments    Subjective Comments          -MM  Client reports her shoulder has been feeling better overall. She notices some discomfort with overhead activity that feels like impingement.   -MM        Subjective Pain    Able to rate subjective pain?  --  yes  -MM     Pre-Treatment Pain Level  --  0  -MM     Post-Treatment Pain Level  --  0  -MM        Total Minutes    41197 - PT Therapeutic Exercise Minutes  --  30  -MM     53266 - PT Manual Therapy Minutes  --  15  -MM        Exercise 1    Exercise Name 1  --  CC: rows (4pl), sweeps (4pl), tricep push down (2 pl), adduction (4pl), IR (3pl), ER (red band), punches (3pl), chest fly (3 pl), pulldowns  -MM     Cueing 1  --  Verbal  -MM     Time 1  --  12 min  -MM        Exercise 2    Exercise Name 2  --  UBE  -MM     Time 2  --  3 min  -MM        Exercise 3    Exercise Name 3  --  overhead press/robbery exercise  -MM     Reps 3  --  10/10/10  -MM        Exercise 4    Exercise Name 4  --  DB flexion to 90/ abduction to 90  -MM     Reps 4  --  10/10  -MM        Exercise 5    Exercise Name 5  --  pendulums/cross arm stretch  -MM     Time 5  --  3 min  -MM       User Key  (r) = Recorded By, (t) = Taken By, (c) = Cosigned By    Initials Name Provider Type    Ramón Alegria, PT Physical Therapist           Manual Rx (last 36 hours)      Manual Treatments     Row Name 12/23/19 0730             Total Minutes    54564 - PT Manual Therapy Minutes  15  -MM         Manual Rx 1    Manual Rx 1 Location  L shoulder  -MM      Manual Rx 1 Type   Provided soft tissue mobilization using ASTYM technique. Moderate pressure was used with ASTYM.   -MM      Manual Rx 1 Duration  15  -MM        User Key  (r) = Recorded By, (t) = Taken By, (c) = Cosigned By    Initials Name Provider Type    Ramón Alegria, PT Physical Therapist        Time  Calculation:   Start Time: 0730  Therapy Charges for Today     Code Description Service Date Service Provider Modifiers Qty    [id number]  PT THER PROC EA 15 MIN 12/23/2019 Ramón Turpin, PT GP 2    [id number]  PT MANUAL THERAPY EA 15 MIN 12/23/2019 Ramón Turpin, PT GP 1          Ramón Turpin, PT  12/23/2019

## 2020-01-06 ENCOUNTER — HOSPITAL ENCOUNTER (OUTPATIENT)
Dept: PHYSICAL THERAPY | Facility: HOSPITAL | Age: 50
Setting detail: THERAPIES SERIES
Discharge: HOME | End: 2020-01-06

## 2020-01-06 DIAGNOSIS — M25.512 ACUTE PAIN OF LEFT SHOULDER: Primary | ICD-10-CM

## 2020-01-06 PROCEDURE — 97110 THERAPEUTIC EXERCISES: CPT

## 2020-01-06 PROCEDURE — 97140 MANUAL THERAPY 1/> REGIONS: CPT

## 2020-01-06 NOTE — THERAPY DISCHARGE NOTE
Outpatient Physical Therapy Ortho Progress Note/Discharge Summary   [place]     Patient Name: Laure Valentin  : 1970  MRN: [id number]  Today's Date: 2020      Visit Date: 2020    Visit Dx:    ICD-10-CM ICD-9-CM   1. Acute pain of left shoulder M25.512 719.41       Patient Active Problem List   Diagnosis   • Annual GYN exam w/o problems   • Vitamin D insufficiency   • Knee pain, bilateral   • Mirena   • Class 1 obesity due to excess calories with body mass index (BMI) of 33.0 to 33.9 in adult        Past Medical History:   Diagnosis Date   • GERD (gastroesophageal reflux disease)     resolved w/ WLS   • History of kidney stones     most recent 17   • History of transfusion of 4 units of packed RBC 2019   • Intramural uterine fibroid    • Iron deficiency anemia 10/4/2017    -Has had colonoscopy.  -Gastric sleeve 2018 but patient states anemia predates her bariatric surgery -Requiring iron infusions last year.  -8/10/2018 h/h , iron 20.  -Ferrous sulfate 325 mg TID, repeat labs 1 month.    • Microcytic anemia         Past Surgical History:   Procedure Laterality Date   • CERVICAL CERCLAGE     • COLONOSCOPY     • D&C WITH SUCTION     • GASTRIC SLEEVE LAPAROSCOPIC N/A 2017    Procedure: GASTRIC SLEEVE LAPAROSCOPY   • KNEE MENISCECTOMY Right 2018   • LAPAROSCOPIC TUBAL LIGATION     • [place] LAP,ESOPHAGOGAST FUNDOPLASTY N/A 2017    Procedure: HIATAL HERNIA REPAIR LAPAROSCOPIC;  Surgeon: Eulalio Dick MD   • WISDOM TOOTH EXTRACTION         PT Ortho     Row Name 20 7830       Subjective Comments    Subjective Comments  Client reports no pain at the time of treatment. she has been exercising at the gym. She does continue to notice some discomfort with overhead activity.   -MM       Subjective Pain    Able to rate subjective pain?  yes  -MM    Pre-Treatment Pain Level  0  -MM    Post-Treatment Pain Level  0  -MM        Posture/Observations    Posture/Observations Comments  Palpation: minimal tenderness subacromial region laterally  -MM       Left Upper Ext    Lt Shoulder Abduction AROM  full range with mild soreness  -MM    Lt Shoulder Extension AROM  70  -MM    Lt Shoulder Flexion AROM  152  -MM    Lt Shoulder External Rotation AROM  WNL  -MM    Lt Shoulder Internal Rotation AROM  T7  -MM       MMT Left Upper Ext    Lt Shoulder Flexion MMT, Gross Movement  (5/5) normal  -MM    Lt Shoulder ABduction MMT, Gross Movement  (4+/5) good plus  -MM    Lt Upper Extremity Comments   some soreness with resisted abduction  -MM      User Key  (r) = Recorded By, (t) = Taken By, (c) = Cosigned By    Initials Name Provider Type    Ramón Alegria, PT Physical Therapist          PT Assessment/Plan     Row Name 01/06/20 8874          PT Assessment    Assessment Comments  Overall client reports good progress. She demonstrates good shoulder ROM without pain today. QuickDASH score improved from 23 to 4.5. Shoulder flexion strength improved to normal. Some improvement noted with shoulder abduction strength, but she does continue with some soreness with resisted abduction. she also has signs of mild rotator cuff impingement. She has a good understanding of exercises to continue for maximizing strength and minimizing pain. Prognosis at discharge is good.   -MM     Please refer to paper survey for additional self-reported information  Yes  -MM        PT Plan    PT Plan Comments  Discharge due to client independence and good progress.   -MM       User Key  (r) = Recorded By, (t) = Taken By, (c) = Cosigned By    Initials Name Provider Type    Ramón Alegria, PT Physical Therapist          OP Exercises     Row Name 01/06/20 5102             Subjective Comments    Subjective Comments  Client reports no pain at the time of treatment. she has been exercising at the gym. She does continue to notice some discomfort with overhead activity.   -MM          Subjective Pain    Able to rate subjective pain?  yes  -MM      Pre-Treatment Pain Level  0  -MM      Post-Treatment Pain Level  0  -MM         Total Minutes    93003 - PT Therapeutic Exercise Minutes  15  -MM      42579 - PT Manual Therapy Minutes  15  -MM         Exercise 1    Exercise Name 1  time for updating objective measures included in ther-ex. Also reviewed home program. Reviewed: rows, pulldowns, horizontal abduction, flexion to 90, and abduction to 90.   -MM      Cueing 1  Verbal  -MM      Time 1  15  -MM      Additional Comments  ther ex  -MM        User Key  (r) = Recorded By, (t) = Taken By, (c) = Cosigned By    Initials Name Provider Type    Ramón Alegria, PT Physical Therapist           Manual Rx (last 36 hours)      Manual Treatments     Row Name 01/06/20 0730             Total Minutes    30188 - PT Manual Therapy Minutes  15  -MM         Manual Rx 1    Manual Rx 1 Location  L shoulder  -MM      Manual Rx 1 Type   Provided soft tissue mobilization using ASTYM technique. Moderate pressure was used with ASTYM.   -MM      Manual Rx 1 Duration  15  -MM        User Key  (r) = Recorded By, (t) = Taken By, (c) = Cosigned By    Initials Name Provider Type    Ramón Alegria, PT Physical Therapist        PT OP Goals     Row Name 01/06/20 0730          PT Short Term Goals    STG Date to Achieve  12/25/19  -MM     STG 1  Client will report 90% improvement in left shoulder pain with daily activity.  -MM     STG 1 Progress  Met  -MM     STG 2  Client will be independent with home program to minimize pain and improve overall function.  -MM     STG 2 Progress  Met  -MM     STG 3  Left shoulder flexion active range of motion will improve to 155 degrees without pain.  -MM     STG 3 Progress  Met  -MM     STG 4  Client will improve to lifting light items overhead without difficulty.  -MM     STG 4 Progress  Met  -MM        Long Term Goals    LTG Date to Achieve  01/15/20  -MM     LTG 1  QuickDASH score  will improve to 12% or better.  -MM     LTG 1 Progress  Met  -MM     LTG 2  Left shoulder flexion strength will improve to 5/5.  -MM     LTG 2 Progress  Met  -MM     LTG 3  Client will return to regular strengthening exercises at the gym without difficulty.  -MM     LTG 3 Progress  Partially Met  -MM     LTG 3 Progress Comments  exercising at the gym. Some soreness at times with activity.  -MM       User Key  (r) = Recorded By, (t) = Taken By, (c) = Cosigned By    Initials Name Provider Type    Ramón Alegria, PT Physical Therapist      Outcome Measure Options: Quick DASH  Quick DASH  Open a tight or new jar.: No Difficulty  Do heavy household chores (e.g., wash walls, wash floors): No Difficulty  Carry a shopping bag or briefcase: No Difficulty  Wash your back: No Difficulty  Use a knife to cut food: No Difficulty  Recreational activities in which you take some force or impact through your arm, should or hand (e.g. golf, hammering, tennis, etc.): No Difficulty  During the past week, to what extent has your arm, shoulder, or hand problem interfered with your normal social activites with family, friends, neighbors or groups?: Slightly  During the past week, were you limited in your work or other regular daily activities as a result of your arm, shoulder or hand problem?: Not limited at all  Arm, Shoulder, or hand pain: Mild  Tingling (pins and needles) in your arm, shoulder, or hand: None  During the past week, how much difficulty have you had sleeping because of the pain in your arm, shoulder or hand?: No difficulty  Number of Questions Answered: 11  Quick DASH Score: 4.55     Time Calculation:   Start Time: 0730  Therapy Charges for Today     Code Description Service Date Service Provider Modifiers Qty    26060837398 HC PT THER PROC EA 15 MIN 1/6/2020 Ramón Turpin, PT GP 1    18073696442 HC PT MANUAL THERAPY EA 15 MIN 1/6/2020 Ramón Turpin, PT GP 1          PT G-Codes  Outcome Measure Options: Quick  DASH  Quick DASH Score: 4.55            Ramón Turpin, PT  1/6/2020

## 2020-01-07 RX ORDER — SODIUM, POTASSIUM,MAG SULFATES 17.5-3.13G
1 SOLUTION, RECONSTITUTED, ORAL ORAL TAKE AS DIRECTED
Qty: 2 BOTTLE | Refills: 0 | Status: SHIPPED | OUTPATIENT
Start: 2020-01-07 | End: 2020-01-08 | Stop reason: ALTCHOICE

## 2020-01-08 DIAGNOSIS — Z12.11 SCREENING FOR COLON CANCER: Primary | ICD-10-CM

## 2020-01-08 RX ORDER — SODIUM, POTASSIUM,MAG SULFATES 17.5-3.13G
1 SOLUTION, RECONSTITUTED, ORAL ORAL TAKE AS DIRECTED
Qty: 2 BOTTLE | Refills: 0 | Status: SHIPPED | OUTPATIENT
Start: 2020-01-08 | End: 2020-08-11

## 2020-01-13 ENCOUNTER — TELEPHONE (OUTPATIENT)
Dept: GASTROENTEROLOGY | Facility: CLINIC | Age: 50
End: 2020-01-13

## 2020-01-13 NOTE — TELEPHONE ENCOUNTER
Patient called and waiting on Colon instructions. I explained to her that Yue will post Colonoscopy instructions to her my chart. Donna went over instructions with patient last week. I also offered to go over instructions with patient again. Patient stated that she is okay. She just wanted to make sure she didn't miss anything.

## 2020-01-14 ENCOUNTER — OUTSIDE FACILITY SERVICE (OUTPATIENT)
Dept: GASTROENTEROLOGY | Facility: CLINIC | Age: 50
End: 2020-01-14

## 2020-01-14 PROCEDURE — G0121 COLON CA SCRN NOT HI RSK IND: HCPCS | Performed by: INTERNAL MEDICINE

## 2020-01-15 PROBLEM — Z98.890 HISTORY OF COLONOSCOPY: Status: ACTIVE | Noted: 2020-01-15

## 2020-01-27 ENCOUNTER — OFFICE VISIT (OUTPATIENT)
Dept: ORTHOPEDIC SURGERY | Facility: CLINIC | Age: 50
End: 2020-01-27

## 2020-01-27 VITALS — HEART RATE: 61 BPM | OXYGEN SATURATION: 99 % | HEIGHT: 63 IN | BODY MASS INDEX: 31.29 KG/M2 | WEIGHT: 176.6 LBS

## 2020-01-27 DIAGNOSIS — M75.82 TENDINITIS OF LEFT ROTATOR CUFF: Primary | ICD-10-CM

## 2020-01-27 PROCEDURE — 99212 OFFICE O/P EST SF 10 MIN: CPT | Performed by: ORTHOPAEDIC SURGERY

## 2020-01-27 RX ORDER — MELOXICAM 7.5 MG/1
7.5 TABLET ORAL DAILY
Qty: 90 TABLET | Refills: 0 | Status: SHIPPED | OUTPATIENT
Start: 2020-01-27 | End: 2020-02-10

## 2020-01-27 NOTE — PROGRESS NOTES
[facility] Orthopaedic Surgery Clinic Note    Subjective     Chief Complaint   Patient presents with   • Follow-up     2 months- Tendinitis of left rotator cuff        HPI    Laure Valentin is a 49 y.o. female.  She follows up today for her left shoulder.  Left shoulder has improved with physical therapy.  Mild pain with certain motions.  Pain is 2 out of 10.      Patient Active Problem List   Diagnosis   • Annual GYN exam w/o problems   • Vitamin D insufficiency   • Knee pain, bilateral   • Mirena   • Class 1 obesity due to excess calories with body mass index (BMI) of 33.0 to 33.9 in adult   • History of colonoscopy     Past Medical History:   Diagnosis Date   • GERD (gastroesophageal reflux disease)     resolved w/ WLS   • History of kidney stones 2011    most recent 12/24/17   • History of transfusion of 4 units of packed RBC 05/2019   • Intramural uterine fibroid 2018   • Iron deficiency anemia 10/4/2017    -Has had colonoscopy.  -Gastric sleeve 1/2018 but patient states anemia predates her bariatric surgery -Requiring iron infusions last year.  -8/10/2018 h/h 9/29, iron 20.  -Ferrous sulfate 325 mg TID, repeat labs 1 month.    • Microcytic anemia       Past Surgical History:   Procedure Laterality Date   • CERVICAL CERCLAGE  2001   • COLONOSCOPY  2011   • D&C WITH SUCTION  1997   • GASTRIC SLEEVE LAPAROSCOPIC N/A 1/26/2017    Procedure: GASTRIC SLEEVE LAPAROSCOPY   • KNEE MENISCECTOMY Right 12/2018   • LAPAROSCOPIC TUBAL LIGATION  2002   • [place] LAP,ESOPHAGOGAST FUNDOPLASTY N/A 1/26/2017    Procedure: HIATAL HERNIA REPAIR LAPAROSCOPIC;  Surgeon: Eulalio Dick MD   • WISDOM TOOTH EXTRACTION  1992      Family History   Problem Relation Age of Onset   • Hypertension Mother    • Hyperlipidemia Mother    • Osteoarthritis Mother    • No Known Problems Father    • Breast cancer Neg Hx    • Ovarian cancer Neg Hx      Social History     Socioeconomic History   • Marital status:      Spouse name: Not on  file   • Number of children: Not on file   • Years of education: Not on file   • Highest education level: Not on file   Tobacco Use   • Smoking status: Never Smoker   • Smokeless tobacco: Never Used   Substance and Sexual Activity   • Alcohol use: No   • Drug use: No   • Sexual activity: Defer      Current Outpatient Medications on File Prior to Visit   Medication Sig Dispense Refill   • ferrous sulfate 325 (65 FE) MG tablet Take 1 tablet by mouth Daily. 90 tablet 1   • levonorgestrel (MIRENA) 20 MCG/24HR IUD      • Multiple Vitamins-Minerals (MULTIVITAMIN ADULT PO) Take 1 tablet by mouth Daily.     • norethindrone (AYGESTIN) 5 MG tablet Take 1 tablet by mouth 2 (Two) Times a Day As Needed (bleeding). 30 tablet 2   • sodium-potassium-magnesium sulfates (SUPREP BOWEL PREP KIT) 17.5-3.13-1.6 GM/177ML solution oral solution Take 1 bottle by mouth Take As Directed. Follow instructions that were mailed to your home. If you didn't receive these call [phone]. 2 bottle 0   • TURMERIC PO Take  by mouth.       No current facility-administered medications on file prior to visit.       No Known Allergies     Review of Systems   Constitutional: Negative.  Negative for activity change, appetite change, chills, diaphoresis, fatigue, fever and unexpected weight change.   HENT: Negative.  Negative for congestion, dental problem, drooling, ear discharge, ear pain, facial swelling, hearing loss, mouth sores, nosebleeds, postnasal drip, rhinorrhea, sinus pressure, sneezing, sore throat, tinnitus, trouble swallowing and voice change.    Eyes: Negative.  Negative for photophobia, pain, discharge, redness, itching and visual disturbance.   Respiratory: Negative.  Negative for apnea, cough, choking, chest tightness, shortness of breath, wheezing and stridor.    Cardiovascular: Negative.  Negative for chest pain, palpitations and leg swelling.   Gastrointestinal: Negative.  Negative for abdominal distention, abdominal pain, anal  "bleeding, blood in stool, constipation, diarrhea, nausea, rectal pain and vomiting.   Endocrine: Negative.  Negative for cold intolerance, heat intolerance, polydipsia, polyphagia and polyuria.   Genitourinary: Negative.  Negative for decreased urine volume, difficulty urinating, dysuria, enuresis, flank pain, frequency, genital sores, hematuria and urgency.   Musculoskeletal: Positive for arthralgias. Negative for back pain, gait problem, joint swelling, myalgias, neck pain and neck stiffness.   Skin: Negative.  Negative for color change, pallor, rash and wound.   Allergic/Immunologic: Negative.  Negative for environmental allergies, food allergies and immunocompromised state.   Neurological: Negative.  Negative for dizziness, tremors, seizures, syncope, facial asymmetry, speech difficulty, weakness, light-headedness, numbness and headaches.   Hematological: Negative.  Negative for adenopathy. Does not bruise/bleed easily.   Psychiatric/Behavioral: Negative.  Negative for agitation, behavioral problems, confusion, decreased concentration, dysphoric mood, hallucinations, self-injury, sleep disturbance and suicidal ideas. The patient is not nervous/anxious and is not hyperactive.         Objective      Physical Exam  Pulse 61   Ht 160 cm (62.99\")   Wt 80.1 kg (176 lb 9.6 oz)   SpO2 99%   BMI 31.29 kg/m²     Body mass index is 31.29 kg/m².    General:   Mental Status:  Alert   Appearance: Cooperative, in no acute distress   Build and Nutrition: Well-nourished well-developed female   Orientation: Alert and oriented to person, place and time   Posture: Normal   Gait: Normal    Upper Extremities:              Left Shoulder:                          Swelling:          None                          Crepitus:          None                          Atrophy:           None                          Range of motion:        External rotation:         80°                                                              Forward " flexion:          180°                                                              Abduction:                   180°  Instability:        None  Deformities:     None  Functional testing: Negative drop arm, negative lift-off, negative impingement      Assessment and Plan     Laure was seen today for follow-up.    Diagnoses and all orders for this visit:    Tendinitis of left rotator cuff    Other orders  -     meloxicam (MOBIC) 7.5 MG tablet; Take 1 tablet by mouth Daily with food.        1. Tendinitis of left rotator cuff        I reviewed my findings with the patient today.  Shoulder improved with physical therapy, and I will see her back for any worsening or problems in the future.    She does have arthritis in her knees, and has been using Aleve more frequently.  I provided a prescription for Mobic.  Monitoring with her primary care provider, and refills through her primary care provider if needed in the future.    Return if symptoms worsen or fail to improve.        Medical Decision Making  Management Options : prescription/IM medicine      El Gill MD  01/28/20  6:31 AM

## 2020-02-10 ENCOUNTER — OFFICE VISIT (OUTPATIENT)
Dept: FAMILY MEDICINE CLINIC | Facility: CLINIC | Age: 50
End: 2020-02-10

## 2020-02-10 ENCOUNTER — LAB (OUTPATIENT)
Dept: LAB | Facility: HOSPITAL | Age: 50
End: 2020-02-10

## 2020-02-10 VITALS
WEIGHT: 179 LBS | HEIGHT: 63 IN | DIASTOLIC BLOOD PRESSURE: 78 MMHG | SYSTOLIC BLOOD PRESSURE: 120 MMHG | HEART RATE: 69 BPM | BODY MASS INDEX: 31.71 KG/M2 | OXYGEN SATURATION: 98 %

## 2020-02-10 DIAGNOSIS — G89.29 CHRONIC PAIN OF BOTH KNEES: Primary | ICD-10-CM

## 2020-02-10 DIAGNOSIS — D50.9 IRON DEFICIENCY ANEMIA, UNSPECIFIED IRON DEFICIENCY ANEMIA TYPE: ICD-10-CM

## 2020-02-10 DIAGNOSIS — M25.561 CHRONIC PAIN OF BOTH KNEES: Primary | ICD-10-CM

## 2020-02-10 DIAGNOSIS — M25.562 CHRONIC PAIN OF BOTH KNEES: Primary | ICD-10-CM

## 2020-02-10 LAB
BASOPHILS # BLD AUTO: 0.06 10*3/MM3 (ref 0–0.2)
BASOPHILS NFR BLD AUTO: 1.1 % (ref 0–1.5)
DEPRECATED RDW RBC AUTO: 44.5 FL (ref 37–54)
EOSINOPHIL # BLD AUTO: 0.04 10*3/MM3 (ref 0–0.4)
EOSINOPHIL NFR BLD AUTO: 0.7 % (ref 0.3–6.2)
ERYTHROCYTE [DISTWIDTH] IN BLOOD BY AUTOMATED COUNT: 14.4 % (ref 12.3–15.4)
FERRITIN SERPL-MCNC: 16.5 NG/ML (ref 13–150)
HCT VFR BLD AUTO: 37.1 % (ref 34–46.6)
HGB BLD-MCNC: 11.7 G/DL (ref 12–15.9)
IMM GRANULOCYTES # BLD AUTO: 0.01 10*3/MM3 (ref 0–0.05)
IMM GRANULOCYTES NFR BLD AUTO: 0.2 % (ref 0–0.5)
LYMPHOCYTES # BLD AUTO: 2.24 10*3/MM3 (ref 0.7–3.1)
LYMPHOCYTES NFR BLD AUTO: 40.1 % (ref 19.6–45.3)
MCH RBC QN AUTO: 26.8 PG (ref 26.6–33)
MCHC RBC AUTO-ENTMCNC: 31.5 G/DL (ref 31.5–35.7)
MCV RBC AUTO: 85.1 FL (ref 79–97)
MONOCYTES # BLD AUTO: 0.45 10*3/MM3 (ref 0.1–0.9)
MONOCYTES NFR BLD AUTO: 8.1 % (ref 5–12)
NEUTROPHILS # BLD AUTO: 2.78 10*3/MM3 (ref 1.7–7)
NEUTROPHILS NFR BLD AUTO: 49.8 % (ref 42.7–76)
NRBC BLD AUTO-RTO: 0 /100 WBC (ref 0–0.2)
PLATELET # BLD AUTO: 364 10*3/MM3 (ref 140–450)
PMV BLD AUTO: 12.4 FL (ref 6–12)
RBC # BLD AUTO: 4.36 10*6/MM3 (ref 3.77–5.28)
WBC NRBC COR # BLD: 5.58 10*3/MM3 (ref 3.4–10.8)

## 2020-02-10 PROCEDURE — 85025 COMPLETE CBC W/AUTO DIFF WBC: CPT

## 2020-02-10 PROCEDURE — 99213 OFFICE O/P EST LOW 20 MIN: CPT | Performed by: PHYSICIAN ASSISTANT

## 2020-02-10 PROCEDURE — 82728 ASSAY OF FERRITIN: CPT

## 2020-02-10 NOTE — PROGRESS NOTES
Chief Complaint   Patient presents with   • Follow-up     6 mth follow up   • Knee Pain     arthritis pain meloxicam helps   • Med Refill     mobic       HPI     Laure Valentin is a pleasant 49 y.o. female who is here for 6 month follow-up of iron deficiency anemia.  Patient states she was started on meloxicam by her orthopedist a couple weeks ago.  It is mildly helped with bilateral knee pain. She denies abdominal pain. She states she takes her iron supplement about 3 times per week. If she takes more regularly than that she has stomach discomfort. She has lighter periods with her IUD.     Past Medical History:   Diagnosis Date   • GERD (gastroesophageal reflux disease)     resolved w/ WLS   • History of kidney stones 2011    most recent 12/24/17   • History of transfusion of 4 units of packed RBC 05/2019   • Intramural uterine fibroid 2018   • Iron deficiency anemia 10/4/2017    -Has had colonoscopy.  -Gastric sleeve 1/2018 but patient states anemia predates her bariatric surgery -Requiring iron infusions last year.  -8/10/2018 h/h 9/29, iron 20.  -Ferrous sulfate 325 mg TID, repeat labs 1 month.    • Microcytic anemia        Past Surgical History:   Procedure Laterality Date   • CERVICAL CERCLAGE  2001   • COLONOSCOPY  2011   • D&C WITH SUCTION  1997   • GASTRIC SLEEVE LAPAROSCOPIC N/A 1/26/2017    Procedure: GASTRIC SLEEVE LAPAROSCOPY   • KNEE MENISCECTOMY Right 12/2018   • LAPAROSCOPIC TUBAL LIGATION  2002   • [place] LAP,ESOPHAGOGAST FUNDOPLASTY N/A 1/26/2017    Procedure: HIATAL HERNIA REPAIR LAPAROSCOPIC;  Surgeon: Eulalio Dick MD   • WISDOM TOOTH EXTRACTION  1992       Family History   Problem Relation Age of Onset   • Hypertension Mother    • Hyperlipidemia Mother    • Osteoarthritis Mother    • No Known Problems Father    • Breast cancer Neg Hx    • Ovarian cancer Neg Hx        Social History     Socioeconomic History   • Marital status:      Spouse name: Not on file   • Number of children:  Not on file   • Years of education: Not on file   • Highest education level: Not on file   Tobacco Use   • Smoking status: Never Smoker   • Smokeless tobacco: Never Used   Substance and Sexual Activity   • Alcohol use: No   • Drug use: No   • Sexual activity: Defer       No Known Allergies    ROS    Review of Systems   Constitutional: Negative for fatigue.   Respiratory: Negative for shortness of breath.    Gastrointestinal: Negative for abdominal pain.   Neurological: Negative for light-headedness.       Vitals:    02/10/20 1412   BP: 120/78   Pulse: 69   SpO2: 98%     Body mass index is 31.72 kg/m².      Current Outpatient Medications:   •  ferrous sulfate 325 (65 FE) MG tablet, Take 1 tablet by mouth Daily., Disp: 90 tablet, Rfl: 1  •  levonorgestrel (MIRENA) 20 MCG/24HR IUD, , Disp: , Rfl:   •  Multiple Vitamins-Minerals (MULTIVITAMIN ADULT PO), Take 1 tablet by mouth Daily., Disp: , Rfl:   •  norethindrone (AYGESTIN) 5 MG tablet, Take 1 tablet by mouth 2 (Two) Times a Day As Needed (bleeding)., Disp: 30 tablet, Rfl: 2  •  TURMERIC PO, Take  by mouth., Disp: , Rfl:   •  diclofenac (VOLTAREN) 1 % gel gel, Apply 4 g topically to the appropriate area as directed 4 (Four) Times a Day., Disp: 100 g, Rfl: 5  •  sodium-potassium-magnesium sulfates (SUPREP BOWEL PREP KIT) 17.5-3.13-1.6 GM/177ML solution oral solution, Take 1 bottle by mouth Take As Directed. Follow instructions that were mailed to your home. If you didn't receive these call [phone]., Disp: 2 bottle, Rfl: 0    PE    Physical Exam   Constitutional: She appears well-developed and well-nourished. No distress.   HENT:   Mouth/Throat: Oropharynx is clear and moist. Mucous membranes are not pale.   Psychiatric: She has a normal mood and affect. Her behavior is normal.       Results    Results for orders placed or performed in visit on 08/12/19   CBC Auto Differential   Result Value Ref Range    WBC 6.35 3.40 - 10.80 10*3/mm3    RBC 4.42 3.77 - 5.28  10*6/mm3    Hemoglobin 12.0 12.0 - 15.9 g/dL    Hematocrit 38.6 34.0 - 46.6 %    MCV 87.3 79.0 - 97.0 fL    MCH 27.1 26.6 - 33.0 pg    MCHC 31.1 (L) 31.5 - 35.7 g/dL    RDW 16.4 (H) 12.3 - 15.4 %    RDW-SD 50.7 37.0 - 54.0 fl    MPV 10.7 6.0 - 12.0 fL    Platelets 416 140 - 450 10*3/mm3    Neutrophil % 59.5 42.7 - 76.0 %    Lymphocyte % 29.9 19.6 - 45.3 %    Monocyte % 8.8 5.0 - 12.0 %    Eosinophil % 0.6 0.3 - 6.2 %    Basophil % 0.9 0.0 - 1.5 %    Immature Grans % 0.3 0.0 - 0.5 %    Neutrophils, Absolute 3.77 1.70 - 7.00 10*3/mm3    Lymphocytes, Absolute 1.90 0.70 - 3.10 10*3/mm3    Monocytes, Absolute 0.56 0.10 - 0.90 10*3/mm3    Eosinophils, Absolute 0.04 0.00 - 0.40 10*3/mm3    Basophils, Absolute 0.06 0.00 - 0.20 10*3/mm3    Immature Grans, Absolute 0.02 0.00 - 0.05 10*3/mm3    nRBC 0.0 0.0 - 0.2 /100 WBC   Ferritin   Result Value Ref Range    Ferritin 13.90 13.00 - 150.00 ng/mL   Iron   Result Value Ref Range    Iron 67 37 - 145 mcg/dL   Lipid Panel   Result Value Ref Range    Total Cholesterol 164 0 - 200 mg/dL    Triglycerides 45 0 - 150 mg/dL    HDL Cholesterol 62 (H) 40 - 60 mg/dL    LDL Cholesterol  93 0 - 100 mg/dL    VLDL Cholesterol 9 5 - 40 mg/dL    LDL/HDL Ratio 1.50    TSH   Result Value Ref Range    TSH 0.750 0.270 - 4.200 mIU/mL   Urinalysis With Culture If Indicated - Urine, Clean Catch   Result Value Ref Range    Color, UA Yellow Yellow, Straw    Appearance, UA Clear Clear    pH, UA 6.5 5.0 - 8.0    Specific Gravity, UA >=1.030 1.005 - 1.030    Glucose, UA Negative Negative    Ketones, UA Trace (A) Negative    Bilirubin, UA Negative Negative    Blood, UA Negative Negative    Protein, UA Negative Negative    Leuk Esterase, UA Trace (A) Negative    Nitrite, UA Negative Negative    Urobilinogen, UA 1.0 E.U./dL 0.2 - 1.0 E.U./dL   Urinalysis, Microscopic Only - Urine, Clean Catch   Result Value Ref Range    RBC, UA 0-2 None Seen, 0-2 /HPF    WBC, UA 0-2 None Seen, 0-2 /HPF    Bacteria, UA Trace (A)  None Seen /HPF    Squamous Epithelial Cells, UA 7-12 (A) None Seen, 0-2 /HPF    Hyaline Casts, UA None Seen None Seen /LPF    Calcium Oxalate Crystals, UA Small/1+ None Seen /HPF    Methodology Manual Light Microscopy        A/P    Problem List Items Addressed This Visit        Musculoskeletal and Integument    Knee pain, bilateral - Primary    Overview     8/18 - Medial and lateral meniscus tears, baker's cyst, mild arthritis per R knee MRI; R knee arthroscopy 12/18 with Dr. Gill  -Recommended patient avoid oral NSAIDs given her history of bariatric surgery  -Trial topical voltaren gel, use Tylenol prn         Other Visit Diagnoses     Iron deficiency anemia, unspecified iron deficiency anemia type      -Monitor     Relevant Orders    CBC & Differential    Ferritin          Plan of care reviewed with patient at the conclusion of today's visit. Education was provided regarding diagnosis, management and any prescribed or recommended OTC medications.  Patient verbalizes understanding of and agreement with management plan.        JAMES Guerrero

## 2020-05-15 ENCOUNTER — TELEMEDICINE (OUTPATIENT)
Dept: FAMILY MEDICINE CLINIC | Facility: TELEHEALTH | Age: 50
End: 2020-05-15

## 2020-05-15 VITALS — TEMPERATURE: 98 F

## 2020-05-15 DIAGNOSIS — R30.0 DYSURIA: Primary | ICD-10-CM

## 2020-05-15 PROCEDURE — 99213 OFFICE O/P EST LOW 20 MIN: CPT | Performed by: NURSE PRACTITIONER

## 2020-05-15 RX ORDER — PHENAZOPYRIDINE HYDROCHLORIDE 200 MG/1
200 TABLET, FILM COATED ORAL 3 TIMES DAILY PRN
Qty: 6 TABLET | Refills: 0 | Status: SHIPPED | OUTPATIENT
Start: 2020-05-15 | End: 2020-05-17

## 2020-05-15 RX ORDER — SULFAMETHOXAZOLE AND TRIMETHOPRIM 800; 160 MG/1; MG/1
1 TABLET ORAL 2 TIMES DAILY
Qty: 6 TABLET | Refills: 0 | Status: SHIPPED | OUTPATIENT
Start: 2020-05-15 | End: 2020-05-18

## 2020-05-15 NOTE — PROGRESS NOTES
Subjective   Laure Valentin is a 49 y.o. female who presents to the clinic with:        Pressure and burning just started, like UTI in past.  Has had two kidney stones in last 6 years    Urinary Tract Infection    This is a new problem. The current episode started today. The problem occurs every urination. The problem has been unchanged. The quality of the pain is described as aching. The pain is mild. There has been no fever. There is no history of pyelonephritis. Pertinent negatives include no chills, discharge, flank pain, frequency, hematuria, hesitancy or urgency. Associated symptoms comments: pressure. Her past medical history is significant for kidney stones.          The following portions of the patient's history were reviewed and updated as appropriate: allergies, current medications, past family history, past medical history, past social history, past surgical history and problem list.        Review of Systems   Constitutional: Negative for activity change, chills, fatigue and fever.   Genitourinary: Positive for dysuria. Negative for difficulty urinating, flank pain, frequency, hematuria, hesitancy, urgency, vaginal bleeding and vaginal discharge.         Objective   Physical Exam   Constitutional: She is oriented to person, place, and time. She appears well-developed and well-nourished.   HENT:   Head: Normocephalic.   Eyes: Pupils are equal, round, and reactive to light.   Neck: Normal range of motion.   Pulmonary/Chest: Effort normal.   Neurological: She is alert and oriented to person, place, and time.   Psychiatric: She has a normal mood and affect.         Assessment/Plan   Laure was seen today for urinary tract infection.    Diagnoses and all orders for this visit:    Dysuria    Other orders  -     sulfamethoxazole-trimethoprim (BACTRIM DS,SEPTRA DS) 800-160 MG per tablet; Take 1 tablet by mouth 2 (Two) Times a Day for 3 days.  -     phenazopyridine (Pyridium) 200 MG tablet; Take 1 tablet by  mouth 3 (Three) Times a Day As Needed for Bladder Spasms for up to 2 days.    increase fluids  To UC or ED if worsen this weekend

## 2020-08-11 ENCOUNTER — OFFICE VISIT (OUTPATIENT)
Dept: FAMILY MEDICINE CLINIC | Facility: CLINIC | Age: 50
End: 2020-08-11

## 2020-08-11 VITALS
SYSTOLIC BLOOD PRESSURE: 110 MMHG | HEIGHT: 63 IN | HEART RATE: 74 BPM | DIASTOLIC BLOOD PRESSURE: 82 MMHG | WEIGHT: 188 LBS | BODY MASS INDEX: 33.31 KG/M2 | OXYGEN SATURATION: 98 %

## 2020-08-11 DIAGNOSIS — Z00.00 ROUTINE MEDICAL EXAM: Primary | ICD-10-CM

## 2020-08-11 DIAGNOSIS — D50.9 IRON DEFICIENCY ANEMIA, UNSPECIFIED IRON DEFICIENCY ANEMIA TYPE: ICD-10-CM

## 2020-08-11 DIAGNOSIS — Z12.31 ENCOUNTER FOR SCREENING MAMMOGRAM FOR MALIGNANT NEOPLASM OF BREAST: ICD-10-CM

## 2020-08-11 DIAGNOSIS — G89.29 CHRONIC PAIN OF BOTH KNEES: ICD-10-CM

## 2020-08-11 DIAGNOSIS — M25.562 CHRONIC PAIN OF BOTH KNEES: ICD-10-CM

## 2020-08-11 DIAGNOSIS — E66.09 CLASS 1 OBESITY DUE TO EXCESS CALORIES WITH BODY MASS INDEX (BMI) OF 33.0 TO 33.9 IN ADULT, UNSPECIFIED WHETHER SERIOUS COMORBIDITY PRESENT: ICD-10-CM

## 2020-08-11 DIAGNOSIS — M25.561 CHRONIC PAIN OF BOTH KNEES: ICD-10-CM

## 2020-08-11 DIAGNOSIS — E55.9 VITAMIN D INSUFFICIENCY: ICD-10-CM

## 2020-08-11 DIAGNOSIS — Z11.59 ENCOUNTER FOR HEPATITIS C SCREENING TEST FOR LOW RISK PATIENT: ICD-10-CM

## 2020-08-11 PROCEDURE — 99396 PREV VISIT EST AGE 40-64: CPT | Performed by: PHYSICIAN ASSISTANT

## 2020-08-11 NOTE — PROGRESS NOTES
"Reason for visit    Laure Valentin is a 49 y.o. female who presents for annual comprehensive exam.    Chief Complaint   Patient presents with   • Annual Exam       HPI     Here for physical. Patient reports no acute concerns.  Off iron supplement since COVID started. Some constipation with taking it.    Diet/Physical activity:  -Diet \"horrible\" since start of this year. Still trying to do low carb and \"get back on track\"  -Has been back to exercising at the gym 4-5 days/wk when they reopened. Does aerobic classes and weights. Still having problems with right knee which limits activity. Swells and mild pain s/p surgery. Voltaren gel helps     Immunizations:  -Current    Cancer screening:   -Negative Fhx breast, ovarian, colorectal cancer  -No hx abnormal PAPs, PAP 2 yrs ago    Depression: PHQ-2 Depression Screening  Little interest or pleasure in doing things? 0   Feeling down, depressed, or hopeless? 0   PHQ-2 Total Score 0      Substance use:  -No drug, tobacco, or alcohol use    Sexual health:  -Monogamous relationship  -No hx of STIs  -Irregular periods s/p IUD, light bleeding - spotting 8-9 days  -LMP about 1 month ago    Intimate partner violence   -Lives with  and 2 children  -Feels safe in relationship and living situation    Dental/vision:  -Current with exams per patient    Past Medical History:   Diagnosis Date   • GERD (gastroesophageal reflux disease)     resolved w/ WLS   • History of kidney stones 2011    most recent 12/24/17   • History of transfusion of 4 units of packed RBC 05/2019   • Intramural uterine fibroid 2018   • Iron deficiency anemia 10/4/2017    -Has had colonoscopy.  -Gastric sleeve 1/2018 but patient states anemia predates her bariatric surgery -Requiring iron infusions last year.  -8/10/2018 h/h 9/29, iron 20.  -Ferrous sulfate 325 mg TID, repeat labs 1 month.    • Microcytic anemia        Past Surgical History:   Procedure Laterality Date   • CERVICAL CERCLAGE  2001   • " COLONOSCOPY  2011   • D&C WITH SUCTION  1997   • GASTRIC SLEEVE LAPAROSCOPIC N/A 1/26/2017    Procedure: GASTRIC SLEEVE LAPAROSCOPY   • KNEE MENISCECTOMY Right 12/2018   • LAPAROSCOPIC TUBAL LIGATION  2002   • [place] LAP,ESOPHAGOGAST FUNDOPLASTY N/A 1/26/2017    Procedure: HIATAL HERNIA REPAIR LAPAROSCOPIC;  Surgeon: Eulalio Dick MD   • WISDOM TOOTH EXTRACTION  1992       Family History   Problem Relation Age of Onset   • Hypertension Mother    • Hyperlipidemia Mother    • Osteoarthritis Mother    • No Known Problems Father    • Breast cancer Neg Hx    • Ovarian cancer Neg Hx        Social History     Socioeconomic History   • Marital status:      Spouse name: Not on file   • Number of children: Not on file   • Years of education: Not on file   • Highest education level: Not on file   Tobacco Use   • Smoking status: Never Smoker   • Smokeless tobacco: Never Used   Substance and Sexual Activity   • Alcohol use: No   • Drug use: No   • Sexual activity: Defer       No Known Allergies    ROS    Review of Systems   Constitutional: Negative for appetite change, chills, fatigue, fever, unexpected weight gain and unexpected weight loss.   HENT: Negative for congestion, hearing loss, nosebleeds, sore throat, swollen glands and trouble swallowing.    Eyes: Negative for blurred vision and visual disturbance.   Respiratory: Negative for apnea, cough, choking, shortness of breath and wheezing.    Cardiovascular: Negative for chest pain, palpitations and leg swelling.   Gastrointestinal: Negative for abdominal pain, blood in stool, constipation, diarrhea and GERD.   Endocrine: Negative for polydipsia.   Genitourinary: Negative for breast discharge, breast lump, breast pain, difficulty urinating, dysuria, frequency, hematuria, pelvic pain, vaginal bleeding and vaginal pain.   Musculoskeletal: Positive for arthralgias and joint swelling.   Skin: Negative for rash and skin lesions.   Allergic/Immunologic: Negative for  environmental allergies.   Neurological: Positive for headache (1 month ago, resolved). Negative for dizziness, syncope, numbness and memory problem.   Hematological: Negative for adenopathy. Does not bruise/bleed easily.   Psychiatric/Behavioral: Negative for sleep disturbance and depressed mood. The patient is not nervous/anxious.        Vitals:    08/11/20 1542   BP: 110/82   Pulse: 74   SpO2: 98%     Body mass index is 33.31 kg/m².      Current Outpatient Medications:   •  diclofenac (VOLTAREN) 1 % gel gel, Apply 4 g topically to the appropriate area as directed 4 (Four) Times a Day., Disp: 100 g, Rfl: 5  •  levonorgestrel (MIRENA) 20 MCG/24HR IUD, , Disp: , Rfl:   •  Multiple Vitamins-Minerals (MULTIVITAMIN ADULT PO), Take 1 tablet by mouth Daily., Disp: , Rfl:   •  TURMERIC PO, Take  by mouth., Disp: , Rfl:   •  ferrous sulfate 325 (65 FE) MG tablet, Take 1 tablet by mouth Daily., Disp: 90 tablet, Rfl: 1    PE    Physical Exam   Constitutional: She appears well-developed and well-nourished. No distress. She is obese.  HENT:   Head: Normocephalic and atraumatic.   Right Ear: Hearing, tympanic membrane and ear canal normal.   Left Ear: Hearing, tympanic membrane and ear canal normal.   Mouth/Throat: Oropharynx is clear and moist. Normal dentition.   Eyes: Pupils are equal, round, and reactive to light. Conjunctivae and EOM are normal.   Neck: Normal range of motion. Neck supple. Carotid bruit is not present. No thyroid mass and no thyromegaly present.   Cardiovascular: Normal rate, regular rhythm, S1 normal, S2 normal, normal heart sounds and intact distal pulses.   No murmur heard.  Pulmonary/Chest: Effort normal and breath sounds normal.   Abdominal: Soft. Normal appearance and bowel sounds are normal. She exhibits no abdominal bruit and no mass. There is no hepatosplenomegaly. There is no tenderness.   Musculoskeletal: Normal range of motion. She exhibits no edema.        Right knee: She exhibits normal range  of motion and no swelling.        Left knee: She exhibits normal range of motion and no swelling.   Lymphadenopathy:     She has no cervical adenopathy.        Right: No supraclavicular adenopathy present.        Left: No supraclavicular adenopathy present.   Neurological: She is alert. She has normal strength. She displays normal reflexes. Gait normal.   Skin: Skin is warm and dry. No rash noted. No cyanosis. Nails show no clubbing.   No suspicious nevi   Psychiatric: She has a normal mood and affect. Her speech is normal and behavior is normal.   Vitals reviewed.      A/P    Problem List Items Addressed This Visit        Digestive    Vitamin D insufficiency    Overview     -Monitor  -No current replacement         Class 1 obesity due to excess calories with body mass index (BMI) of 33.0 to 33.9 in adult  -Discussed weight, BMI, diet, exercise  -Discussed goal wt loss 1/2 pound/wk  -RTC 6 months       Musculoskeletal and Integument    Knee pain, bilateral    Overview     -8/18 - Medial and lateral meniscus tears, baker's cyst, per R knee MRI; R knee arthroscopy 12/18 with Dr. Gill  -Continue voltaren gel prn         Other Visit Diagnoses     Routine medical exam    -  Primary  -Counseled patient regarding cancer screening, immunizations, healthy lifestyle, diet, maintaining a healthy weight, and exercise.  -Annual dental, eye, and gynecologic exams were recommended.     Relevant Orders    CBC & Differential    Comprehensive Metabolic Panel    Lipid Panel    Iron Profile    Ferritin    TSH Rfx On Abnormal To Free T4    Urinalysis With Culture If Indicated - Urine, Clean Catch    Vitamin B12    Vitamin D 25 Hydroxy    Iron deficiency anemia, unspecified iron deficiency anemia type    -Not currently taking iron supplement  -Monitor iron studies, CBC        Encounter for screening mammogram for malignant neoplasm of breast        Relevant Orders    Mammo Screening Digital Tomosynthesis Bilateral With CAD    Encounter  for hepatitis C screening test for low risk patient        Relevant Orders    Hepatitis C Antibody          Plan of care was reviewed with patient at the conclusion of today's visit. Education was provided regarding diagnoses, management, treatment plan, and the importance of keeping follow-up appointments. The patient was counseled regarding the risks, benefits, and possible side-effects of treatment. Patient and/or family expresses understanding and agreement with the management plan.        JAMES Guerrero

## 2020-08-13 ENCOUNTER — LAB (OUTPATIENT)
Dept: LAB | Facility: HOSPITAL | Age: 50
End: 2020-08-13

## 2020-08-13 DIAGNOSIS — Z11.59 ENCOUNTER FOR HEPATITIS C SCREENING TEST FOR LOW RISK PATIENT: ICD-10-CM

## 2020-08-13 DIAGNOSIS — Z00.00 ROUTINE MEDICAL EXAM: ICD-10-CM

## 2020-08-13 LAB
25(OH)D3 SERPL-MCNC: 35.2 NG/ML (ref 30–100)
ALBUMIN SERPL-MCNC: 4.3 G/DL (ref 3.5–5.2)
ALBUMIN/GLOB SERPL: 1.4 G/DL
ALP SERPL-CCNC: 43 U/L (ref 39–117)
ALT SERPL W P-5'-P-CCNC: 12 U/L (ref 1–33)
ANION GAP SERPL CALCULATED.3IONS-SCNC: 9.6 MMOL/L (ref 5–15)
AST SERPL-CCNC: 15 U/L (ref 1–32)
BASOPHILS # BLD AUTO: 0.05 10*3/MM3 (ref 0–0.2)
BASOPHILS NFR BLD AUTO: 1 % (ref 0–1.5)
BILIRUB SERPL-MCNC: 0.5 MG/DL (ref 0–1.2)
BILIRUB UR QL STRIP: NEGATIVE
BUN SERPL-MCNC: 13 MG/DL (ref 6–20)
BUN/CREAT SERPL: 17.8 (ref 7–25)
CALCIUM SPEC-SCNC: 9.8 MG/DL (ref 8.6–10.5)
CHLORIDE SERPL-SCNC: 100 MMOL/L (ref 98–107)
CHOLEST SERPL-MCNC: 193 MG/DL (ref 0–200)
CLARITY UR: CLEAR
CO2 SERPL-SCNC: 26.4 MMOL/L (ref 22–29)
COLOR UR: ABNORMAL
CREAT SERPL-MCNC: 0.73 MG/DL (ref 0.57–1)
DEPRECATED RDW RBC AUTO: 41 FL (ref 37–54)
EOSINOPHIL # BLD AUTO: 0.07 10*3/MM3 (ref 0–0.4)
EOSINOPHIL NFR BLD AUTO: 1.4 % (ref 0.3–6.2)
ERYTHROCYTE [DISTWIDTH] IN BLOOD BY AUTOMATED COUNT: 14.1 % (ref 12.3–15.4)
FERRITIN SERPL-MCNC: 13 NG/ML (ref 13–150)
GFR SERPL CREATININE-BSD FRML MDRD: 103 ML/MIN/1.73
GLOBULIN UR ELPH-MCNC: 3.1 GM/DL
GLUCOSE SERPL-MCNC: 94 MG/DL (ref 65–99)
GLUCOSE UR STRIP-MCNC: NEGATIVE MG/DL
HCT VFR BLD AUTO: 36.4 % (ref 34–46.6)
HCV AB SER DONR QL: NORMAL
HDLC SERPL-MCNC: 64 MG/DL (ref 40–60)
HGB BLD-MCNC: 11.6 G/DL (ref 12–15.9)
HGB UR QL STRIP.AUTO: NEGATIVE
IMM GRANULOCYTES # BLD AUTO: 0.01 10*3/MM3 (ref 0–0.05)
IMM GRANULOCYTES NFR BLD AUTO: 0.2 % (ref 0–0.5)
IRON 24H UR-MRATE: 55 MCG/DL (ref 37–145)
IRON SATN MFR SERPL: 10 % (ref 20–50)
KETONES UR QL STRIP: NEGATIVE
LDLC SERPL CALC-MCNC: 119 MG/DL (ref 0–100)
LDLC/HDLC SERPL: 1.86 {RATIO}
LEUKOCYTE ESTERASE UR QL STRIP.AUTO: NEGATIVE
LYMPHOCYTES # BLD AUTO: 1.69 10*3/MM3 (ref 0.7–3.1)
LYMPHOCYTES NFR BLD AUTO: 34.3 % (ref 19.6–45.3)
MCH RBC QN AUTO: 25.8 PG (ref 26.6–33)
MCHC RBC AUTO-ENTMCNC: 31.9 G/DL (ref 31.5–35.7)
MCV RBC AUTO: 80.9 FL (ref 79–97)
MONOCYTES # BLD AUTO: 0.56 10*3/MM3 (ref 0.1–0.9)
MONOCYTES NFR BLD AUTO: 11.4 % (ref 5–12)
NEUTROPHILS NFR BLD AUTO: 2.55 10*3/MM3 (ref 1.7–7)
NEUTROPHILS NFR BLD AUTO: 51.7 % (ref 42.7–76)
NITRITE UR QL STRIP: NEGATIVE
NRBC BLD AUTO-RTO: 0 /100 WBC (ref 0–0.2)
PH UR STRIP.AUTO: 6 [PH] (ref 5–8)
PLATELET # BLD AUTO: 374 10*3/MM3 (ref 140–450)
PMV BLD AUTO: 11.8 FL (ref 6–12)
POTASSIUM SERPL-SCNC: 4.6 MMOL/L (ref 3.5–5.2)
PROT SERPL-MCNC: 7.4 G/DL (ref 6–8.5)
PROT UR QL STRIP: NEGATIVE
RBC # BLD AUTO: 4.5 10*6/MM3 (ref 3.77–5.28)
SODIUM SERPL-SCNC: 136 MMOL/L (ref 136–145)
SP GR UR STRIP: >=1.03 (ref 1–1.03)
TIBC SERPL-MCNC: 541 MCG/DL (ref 298–536)
TRANSFERRIN SERPL-MCNC: 363 MG/DL (ref 200–360)
TRIGL SERPL-MCNC: 51 MG/DL (ref 0–150)
TSH SERPL DL<=0.05 MIU/L-ACNC: 0.82 UIU/ML (ref 0.27–4.2)
UROBILINOGEN UR QL STRIP: ABNORMAL
VIT B12 BLD-MCNC: 1379 PG/ML (ref 211–946)
VLDLC SERPL-MCNC: 10.2 MG/DL (ref 5–40)
WBC # BLD AUTO: 4.93 10*3/MM3 (ref 3.4–10.8)

## 2020-08-13 PROCEDURE — 80053 COMPREHEN METABOLIC PANEL: CPT

## 2020-08-13 PROCEDURE — 81003 URINALYSIS AUTO W/O SCOPE: CPT

## 2020-08-13 PROCEDURE — 82306 VITAMIN D 25 HYDROXY: CPT

## 2020-08-13 PROCEDURE — 84443 ASSAY THYROID STIM HORMONE: CPT

## 2020-08-13 PROCEDURE — 80061 LIPID PANEL: CPT

## 2020-08-13 PROCEDURE — 85025 COMPLETE CBC W/AUTO DIFF WBC: CPT

## 2020-08-13 PROCEDURE — 86803 HEPATITIS C AB TEST: CPT

## 2020-08-13 PROCEDURE — 82607 VITAMIN B-12: CPT

## 2020-08-13 PROCEDURE — 82728 ASSAY OF FERRITIN: CPT

## 2020-08-13 PROCEDURE — 83540 ASSAY OF IRON: CPT

## 2020-08-13 PROCEDURE — 84466 ASSAY OF TRANSFERRIN: CPT

## 2020-10-13 ENCOUNTER — HOSPITAL ENCOUNTER (EMERGENCY)
Facility: HOSPITAL | Age: 50
Discharge: HOME | End: 2020-10-13
Attending: EMERGENCY MEDICINE | Admitting: EMERGENCY MEDICINE

## 2020-10-13 ENCOUNTER — APPOINTMENT (OUTPATIENT)
Dept: CT IMAGING | Facility: HOSPITAL | Age: 50
End: 2020-10-13

## 2020-10-13 VITALS
WEIGHT: 195 LBS | HEIGHT: 63 IN | SYSTOLIC BLOOD PRESSURE: 125 MMHG | OXYGEN SATURATION: 100 % | TEMPERATURE: 98.6 F | RESPIRATION RATE: 18 BRPM | DIASTOLIC BLOOD PRESSURE: 80 MMHG | HEART RATE: 65 BPM | BODY MASS INDEX: 34.55 KG/M2
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
## 2020-10-13 DIAGNOSIS — S13.9XXA NECK SPRAIN, INITIAL ENCOUNTER: ICD-10-CM

## 2020-10-13 PROCEDURE — 70450 CT HEAD/BRAIN W/O DYE: CPT

## 2020-10-13 PROCEDURE — 99282 EMERGENCY DEPT VISIT SF MDM: CPT

## 2020-10-13 PROCEDURE — 72125 CT NECK SPINE W/O DYE: CPT
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
## 2020-10-21 ENCOUNTER — OFFICE VISIT (OUTPATIENT)
Dept: FAMILY MEDICINE CLINIC | Facility: CLINIC | Age: 50
End: 2020-10-21

## 2020-10-21 VITALS
HEART RATE: 63 BPM | DIASTOLIC BLOOD PRESSURE: 68 MMHG | BODY MASS INDEX: 34.55 KG/M2 | HEIGHT: 63 IN | WEIGHT: 195 LBS | SYSTOLIC BLOOD PRESSURE: 110 MMHG | OXYGEN SATURATION: 97 %

## 2020-10-21 DIAGNOSIS — S16.1XXA STRAIN OF NECK MUSCLE, INITIAL ENCOUNTER: Primary | ICD-10-CM

## 2020-10-21 DIAGNOSIS — S06.0X0A CONCUSSION WITHOUT LOSS OF CONSCIOUSNESS, INITIAL ENCOUNTER: ICD-10-CM

## 2020-10-21 DIAGNOSIS — S13.4XXA WHIPLASH INJURY TO NECK, INITIAL ENCOUNTER: ICD-10-CM
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
## 2020-10-21 PROCEDURE — 99213 OFFICE O/P EST LOW 20 MIN: CPT | Performed by: PHYSICIAN ASSISTANT

## 2020-10-21 RX ORDER — METRONIDAZOLE 500 MG/1
500 TABLET ORAL 2 TIMES DAILY
Qty: 14 TABLET | Refills: 0 | Status: SHIPPED | OUTPATIENT
Start: 2020-10-21 | End: 2020-10-28

## 2020-10-21 RX ORDER — CYCLOBENZAPRINE HCL 10 MG
5-10 TABLET ORAL 3 TIMES DAILY PRN
Qty: 30 TABLET | Refills: 0 | Status: SHIPPED | OUTPATIENT
Start: 2020-10-21 | End: 2021-02-19 | Stop reason: SDUPTHER

## 2020-10-23 ENCOUNTER — HOSPITAL ENCOUNTER (OUTPATIENT)
Dept: PHYSICAL THERAPY | Facility: HOSPITAL | Age: 50
Setting detail: THERAPIES SERIES
Discharge: HOME | End: 2020-10-23

## 2020-10-23 DIAGNOSIS — M54.2 NECK PAIN: Primary | ICD-10-CM

## 2020-10-23 DIAGNOSIS — G44.319 ACUTE POST-TRAUMATIC HEADACHE, NOT INTRACTABLE: ICD-10-CM

## 2020-10-23 DIAGNOSIS — S13.4XXA WHIPLASH INJURY TO NECK, INITIAL ENCOUNTER: ICD-10-CM

## 2020-10-23 PROCEDURE — 97161 PT EVAL LOW COMPLEX 20 MIN: CPT
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
## 2020-10-27 ENCOUNTER — HOSPITAL ENCOUNTER (OUTPATIENT)
Dept: PHYSICAL THERAPY | Facility: HOSPITAL | Age: 50
Setting detail: THERAPIES SERIES
Discharge: HOME | End: 2020-10-27

## 2020-10-27 DIAGNOSIS — G44.319 ACUTE POST-TRAUMATIC HEADACHE, NOT INTRACTABLE: ICD-10-CM

## 2020-10-27 DIAGNOSIS — S13.4XXA WHIPLASH INJURY TO NECK, INITIAL ENCOUNTER: ICD-10-CM

## 2020-10-27 DIAGNOSIS — M54.2 NECK PAIN: Primary | ICD-10-CM

## 2020-10-27 PROCEDURE — 97140 MANUAL THERAPY 1/> REGIONS: CPT

## 2020-10-27 PROCEDURE — 97110 THERAPEUTIC EXERCISES: CPT

## 2020-10-27 NOTE — THERAPY TREATMENT NOTE
Outpatient Physical Therapy Ortho Treatment Note  [facility]     Patient Name: Laure Valentin  : 1970  MRN: [id number]  Today's Date: 10/27/2020      Visit Date: 10/27/2020    Visit Dx:    ICD-10-CM ICD-9-CM   1. Neck pain  M54.2 723.1   2. Whiplash injury to neck, initial encounter  S13.4XXA 847.0   3. Acute post-traumatic headache, not intractable  G44.319 339.21       Patient Active Problem List   Diagnosis   • Annual GYN exam w/o problems   • Vitamin D insufficiency   • Knee pain, bilateral   • Mirena   • Class 1 obesity due to excess calories with body mass index (BMI) of 33.0 to 33.9 in adult   • History of colonoscopy        Past Medical History:   Diagnosis Date   • GERD (gastroesophageal reflux disease)     resolved w/ WLS   • History of kidney stones     most recent 17   • History of transfusion of 4 units of packed RBC 2019   • Intramural uterine fibroid    • Iron deficiency anemia 10/4/2017    -Has had colonoscopy.  -Gastric sleeve 2018 but patient states anemia predates her bariatric surgery -Requiring iron infusions last year.  -8/10/2018 h/h , iron 20.  -Ferrous sulfate 325 mg TID, repeat labs 1 month.    • Microcytic anemia         Past Surgical History:   Procedure Laterality Date   • CERVICAL CERCLAGE     • COLONOSCOPY     • D&C WITH SUCTION     • GASTRIC SLEEVE LAPAROSCOPIC N/A 2017    Procedure: GASTRIC SLEEVE LAPAROSCOPY   • KNEE MENISCECTOMY Right 2018   • LAPAROSCOPIC TUBAL LIGATION     • [place] LAP,ESOPHAGOGAST FUNDOPLASTY N/A 2017    Procedure: HIATAL HERNIA REPAIR LAPAROSCOPIC;  Surgeon: Eulalio Dick MD   • WISDOM TOOTH EXTRACTION                         PT Assessment/Plan     Row Name 10/27/20 1515          PT Assessment    Assessment Comments  Mild soreness overall today.  Exercises were tolerated well.  She was able to perform lateral flexion today without dizziness.  No complaints with ASTYM.  -MM        PT  Plan    PT Plan Comments  Continue per plan of treatment with manual therapy and exercises.  -MM       User Key  (r) = Recorded By, (t) = Taken By, (c) = Cosigned By    Initials Name Provider Type    Ramón Alegria, PT Physical Therapist            OP Exercises     Row Name 10/27/20 1515             Subjective Comments    Subjective Comments  Client reports that she still has some tingling, but feels that her overall mobility is better.  -MM         Subjective Pain    Able to rate subjective pain?  yes  -MM      Pre-Treatment Pain Level  1  -MM      Post-Treatment Pain Level  1  -MM         Total Minutes    34162 - PT Therapeutic Exercise Minutes  15  -MM      13651 - PT Manual Therapy Minutes  15  -MM         Exercise 1    Exercise Name 1  supine neck rotation AROM  -MM      Reps 1  10 ea  -MM         Exercise 2    Exercise Name 2  chin tucks  -MM      Reps 2  15  -MM         Exercise 3    Exercise Name 3  seated AROM upper trap stretch  -MM      Time 3  2 min  -MM         Exercise 4    Exercise Name 4  band scaption with ER  -MM      Reps 4  15  -MM      Additional Comments  red  -MM         Exercise 5    Exercise Name 5  towel neck extension seated  -MM      Reps 5  15  -MM         Exercise 6    Exercise Name 6  shoulder rolls reverse  -MM      Reps 6  15  -MM        User Key  (r) = Recorded By, (t) = Taken By, (c) = Cosigned By    Initials Name Provider Type    Ramón Alegria, PT Physical Therapist                      Manual Rx (last 36 hours)      Manual Treatments     Row Name 10/27/20 1515             Total Minutes    91725 - PT Manual Therapy Minutes  15  -MM         Manual Rx 1    Manual Rx 1 Location  cervical paravertebrals, bilateral upper trap/mid trap  -MM      Manual Rx 1 Type  Provided soft tissue mobilization using ASTYM technique.  Client was positioned prone moderate pressure was used with ASTYM.  -MM      Manual Rx 1 Duration  15  -MM        User Key  (r) = Recorded By, (t) = Taken  By, (c) = Cosigned By    Initials Name Provider Type    MM Ramón Turpin, PT Physical Therapist                             Time Calculation:   Start Time: 1515  Therapy Charges for Today     Code Description Service Date Service Provider Modifiers Qty    88982111108  PT THER PROC EA 15 MIN 10/27/2020 Ramón Turpin, PT GP 1    [id number]  PT MANUAL THERAPY EA 15 MIN 10/27/2020 Ramón Turpin, PT GP 1                    Ramón Turpin, PT  10/27/2020

## 2020-10-29 ENCOUNTER — HOSPITAL ENCOUNTER (OUTPATIENT)
Dept: PHYSICAL THERAPY | Facility: HOSPITAL | Age: 50
Setting detail: THERAPIES SERIES
Discharge: HOME | End: 2020-10-29

## 2020-10-29 DIAGNOSIS — M54.2 NECK PAIN: Primary | ICD-10-CM

## 2020-10-29 DIAGNOSIS — G44.319 ACUTE POST-TRAUMATIC HEADACHE, NOT INTRACTABLE: ICD-10-CM

## 2020-10-29 DIAGNOSIS — S13.4XXA WHIPLASH INJURY TO NECK, INITIAL ENCOUNTER: ICD-10-CM

## 2020-10-29 PROCEDURE — 97140 MANUAL THERAPY 1/> REGIONS: CPT

## 2020-10-29 PROCEDURE — 97110 THERAPEUTIC EXERCISES: CPT

## 2020-10-29 NOTE — THERAPY TREATMENT NOTE
Outpatient Physical Therapy Ortho Treatment Note  [facility]     Patient Name: Laure Valentin  : 1970  MRN: [id number]  Today's Date: 10/29/2020      Visit Date: 10/29/2020    Visit Dx:    ICD-10-CM ICD-9-CM   1. Neck pain  M54.2 723.1   2. Whiplash injury to neck, initial encounter  S13.4XXA 847.0   3. Acute post-traumatic headache, not intractable  G44.319 339.21       Patient Active Problem List   Diagnosis   • Annual GYN exam w/o problems   • Vitamin D insufficiency   • Knee pain, bilateral   • Mirena   • Class 1 obesity due to excess calories with body mass index (BMI) of 33.0 to 33.9 in adult   • History of colonoscopy        Past Medical History:   Diagnosis Date   • GERD (gastroesophageal reflux disease)     resolved w/ WLS   • History of kidney stones     most recent 17   • History of transfusion of 4 units of packed RBC 2019   • Intramural uterine fibroid    • Iron deficiency anemia 10/4/2017    -Has had colonoscopy.  -Gastric sleeve 2018 but patient states anemia predates her bariatric surgery -Requiring iron infusions last year.  -8/10/2018 h/h , iron 20.  -Ferrous sulfate 325 mg TID, repeat labs 1 month.    • Microcytic anemia         Past Surgical History:   Procedure Laterality Date   • CERVICAL CERCLAGE     • COLONOSCOPY     • D&C WITH SUCTION     • GASTRIC SLEEVE LAPAROSCOPIC N/A 2017    Procedure: GASTRIC SLEEVE LAPAROSCOPY   • KNEE MENISCECTOMY Right 2018   • LAPAROSCOPIC TUBAL LIGATION     • [place] LAP,ESOPHAGOGAST FUNDOPLASTY N/A 2017    Procedure: HIATAL HERNIA REPAIR LAPAROSCOPIC;  Surgeon: Eulalio Dick MD   • WISDOM TOOTH EXTRACTION                         PT Assessment/Plan     Row Name 10/29/20 9541          PT Assessment    Assessment Comments  Pt is noticing gradual improvements in symptoms with less pain with rotation. Able to tolerate progressions in mobility and postural strengthening exercises well. Added  chin tucks with OP to HEP to assist with headache relief.  -AC        PT Plan    PT Plan Comments  Cont per POC incorporating MT/modalities PRN.  -AC       User Key  (r) = Recorded By, (t) = Taken By, (c) = Cosigned By    Initials Name Provider Type    AC Toshia Segura, PT Physical Therapist            OP Exercises     Row Name 10/29/20 1430             Subjective Comments    Subjective Comments  Pt states she is noticing less intense headaches and has not had nausea since last session. Having occasional burning at base of neck but feels that mobility is better.  -AC         Subjective Pain    Able to rate subjective pain?  yes  -AC      Pre-Treatment Pain Level  3  -AC      Post-Treatment Pain Level  1  -AC         Total Minutes    31014 - PT Therapeutic Exercise Minutes  18  -AC      11759 - PT Manual Therapy Minutes  10  -AC         Exercise 1    Exercise Name 1  Chin tucks w/ OP  -AC      Reps 1  15 ea  -AC         Exercise 2    Exercise Name 2  Seated AROM UT stretch  -AC      Time 2  2'  -AC      Additional Comments  With light OP  -AC         Exercise 3    Exercise Name 3  Scap retract w/ ER  -AC      Reps 3  15  -AC      Additional Comments  Red  -AC         Exercise 4    Exercise Name 4  Rows  -AC      Reps 4  15  -AC      Additional Comments  Red  -AC         Exercise 5    Exercise Name 5  Towel neck extensions  -AC      Reps 5  10  -AC         Exercise 6    Exercise Name 6  Towel-assisted rotations  -AC      Reps 6  10/10  -AC        User Key  (r) = Recorded By, (t) = Taken By, (c) = Cosigned By    Initials Name Provider Type    AC Toshia Segura, PT Physical Therapist                      Manual Rx (last 36 hours)      Manual Treatments     Row Name 10/29/20 1430             Total Minutes    41271 - PT Manual Therapy Minutes  10  -AC         Manual Rx 1    Manual Rx 1 Location  cervical paravertebrals, bilateral upper trap/mid trap  -AC      Manual Rx 1 Type  Provided soft tissue mobilization  using ASTYM technique.  Client was positioned prone moderate pressure was used with ASTYM.  -AC      Manual Rx 1 Duration  10  -AC        User Key  (r) = Recorded By, (t) = Taken By, (c) = Cosigned By    Initials Name Provider Type    AC Toshia Segura, PT Physical Therapist                             Time Calculation:   Start Time: 1430  Therapy Charges for Today     Code Description Service Date Service Provider Modifiers Qty    87775764854  PT THER PROC EA 15 MIN 10/29/2020 Toshia Segura, PT GP 1    [id number]  PT MANUAL THERAPY EA 15 MIN 10/29/2020 Toshia Segura, PT GP 1                    Toshia Segura, PT  10/29/2020

## 2020-11-02 ENCOUNTER — HOSPITAL ENCOUNTER (OUTPATIENT)
Dept: PHYSICAL THERAPY | Facility: HOSPITAL | Age: 50
Setting detail: THERAPIES SERIES
Discharge: HOME | End: 2020-11-02

## 2020-11-02 DIAGNOSIS — S13.4XXA WHIPLASH INJURY TO NECK, INITIAL ENCOUNTER: ICD-10-CM

## 2020-11-02 DIAGNOSIS — G44.319 ACUTE POST-TRAUMATIC HEADACHE, NOT INTRACTABLE: ICD-10-CM

## 2020-11-02 DIAGNOSIS — M54.2 NECK PAIN: Primary | ICD-10-CM

## 2020-11-02 PROCEDURE — 97110 THERAPEUTIC EXERCISES: CPT

## 2020-11-02 PROCEDURE — 97140 MANUAL THERAPY 1/> REGIONS: CPT

## 2020-11-02 NOTE — THERAPY TREATMENT NOTE
Outpatient Physical Therapy Ortho Treatment Note  [facility]     Patient Name: Laure Valentin  : 1970  MRN: [id number]  Today's Date: 2020      Visit Date: 2020    Visit Dx:    ICD-10-CM ICD-9-CM   1. Neck pain  M54.2 723.1   2. Whiplash injury to neck, initial encounter  S13.4XXA 847.0   3. Acute post-traumatic headache, not intractable  G44.319 339.21       Patient Active Problem List   Diagnosis   • Annual GYN exam w/o problems   • Vitamin D insufficiency   • Knee pain, bilateral   • Mirena   • Class 1 obesity due to excess calories with body mass index (BMI) of 33.0 to 33.9 in adult   • History of colonoscopy        Past Medical History:   Diagnosis Date   • GERD (gastroesophageal reflux disease)     resolved w/ WLS   • History of kidney stones     most recent 17   • History of transfusion of 4 units of packed RBC 2019   • Intramural uterine fibroid    • Iron deficiency anemia 10/4/2017    -Has had colonoscopy.  -Gastric sleeve 2018 but patient states anemia predates her bariatric surgery -Requiring iron infusions last year.  -8/10/2018 h/h , iron 20.  -Ferrous sulfate 325 mg TID, repeat labs 1 month.    • Microcytic anemia         Past Surgical History:   Procedure Laterality Date   • CERVICAL CERCLAGE     • COLONOSCOPY     • D&C WITH SUCTION     • GASTRIC SLEEVE LAPAROSCOPIC N/A 2017    Procedure: GASTRIC SLEEVE LAPAROSCOPY   • KNEE MENISCECTOMY Right 2018   • LAPAROSCOPIC TUBAL LIGATION     • [place] LAP,ESOPHAGOGAST FUNDOPLASTY N/A 2017    Procedure: HIATAL HERNIA REPAIR LAPAROSCOPIC;  Surgeon: Eulalio Dick MD   • WISDOM TOOTH EXTRACTION       PT Assessment/Plan     Row Name 20 1500          PT Assessment    Assessment Comments  Pain is improving. Mild tenderness wtih ASTYM today.   -MM        PT Plan    PT Plan Comments  Continue per plan of treatment.   -MM       User Key  (r) = Recorded By, (t) = Taken By, (c) =  Cosigned By    Initials Name Provider Type    Ramón Alegria, PT Physical Therapist        OP Exercises     Row Name 11/02/20 1500             Subjective Comments    Subjective Comments  Client reports feeling much better today than she did last week. Tingling has been mild.   -MM         Subjective Pain    Able to rate subjective pain?  yes  -MM      Pre-Treatment Pain Level  1  -MM      Post-Treatment Pain Level  1  -MM         Total Minutes    42611 - PT Therapeutic Exercise Minutes  16  -MM      42203 - PT Manual Therapy Minutes  12  -MM         Exercise 1    Exercise Name 1  UBE  -MM      Time 1  4 min  -MM         Exercise 2    Exercise Name 2  CC: rows, sweeps,punches  -MM      Reps 2  15/15/15  -MM         Exercise 3    Exercise Name 3  seated neck stretches: extension, rotation, lateral flexion  -MM      Time 3  2 min  -MM         Exercise 4    Exercise Name 4  standing shoulder flex/abd to 90  -MM      Reps 4  8/8  -MM      Additional Comments  4# DB  -MM        User Key  (r) = Recorded By, (t) = Taken By, (c) = Cosigned By    Initials Name Provider Type    Ramón Alegria, PT Physical Therapist           Manual Rx (last 36 hours)      Manual Treatments     Row Name 11/02/20 1500             Total Minutes    69028 - PT Manual Therapy Minutes  12  -MM         Manual Rx 1    Manual Rx 1 Location  cervical paravertebrals, bilateral upper trap/mid trap  -MM      Manual Rx 1 Type  Provided soft tissue mobilization using ASTYM technique.  Client was positioned prone moderate pressure was used with ASTYM.  -MM      Manual Rx 1 Duration  12  -MM        User Key  (r) = Recorded By, (t) = Taken By, (c) = Cosigned By    Initials Name Provider Type    Ramón Alegria, PT Physical Therapist        Time Calculation:   Start Time: 1500  Therapy Charges for Today     Code Description Service Date Service Provider Modifiers Qty    17226287769  PT THER PROC EA 15 MIN 11/2/2020 Ramón Turpin, PT GP 1     [id number]  PT MANUAL THERAPY EA 15 MIN 11/2/2020 Ramón Turpin, PT GP 1            Ramón Turpin, PT  11/2/2020

## 2020-11-05 ENCOUNTER — HOSPITAL ENCOUNTER (OUTPATIENT)
Dept: PHYSICAL THERAPY | Facility: HOSPITAL | Age: 50
Setting detail: THERAPIES SERIES
Discharge: HOME | End: 2020-11-05

## 2020-11-05 DIAGNOSIS — M54.2 NECK PAIN: Primary | ICD-10-CM

## 2020-11-05 DIAGNOSIS — S13.4XXA WHIPLASH INJURY TO NECK, INITIAL ENCOUNTER: ICD-10-CM

## 2020-11-05 DIAGNOSIS — G44.319 ACUTE POST-TRAUMATIC HEADACHE, NOT INTRACTABLE: ICD-10-CM

## 2020-11-05 PROCEDURE — 97110 THERAPEUTIC EXERCISES: CPT

## 2020-11-05 PROCEDURE — 97140 MANUAL THERAPY 1/> REGIONS: CPT

## 2020-11-05 NOTE — THERAPY TREATMENT NOTE
Outpatient Physical Therapy Ortho Treatment Note  [facility]     Patient Name: Laure Valentin  : 1970  MRN: [id number]  Today's Date: 2020      Visit Date: 2020    Visit Dx:    ICD-10-CM ICD-9-CM   1. Neck pain  M54.2 723.1   2. Whiplash injury to neck, initial encounter  S13.4XXA 847.0   3. Acute post-traumatic headache, not intractable  G44.319 339.21       Patient Active Problem List   Diagnosis   • Annual GYN exam w/o problems   • Vitamin D insufficiency   • Knee pain, bilateral   • Mirena   • Class 1 obesity due to excess calories with body mass index (BMI) of 33.0 to 33.9 in adult   • History of colonoscopy        Past Medical History:   Diagnosis Date   • GERD (gastroesophageal reflux disease)     resolved w/ WLS   • History of kidney stones     most recent 17   • History of transfusion of 4 units of packed RBC 2019   • Intramural uterine fibroid    • Iron deficiency anemia 10/4/2017    -Has had colonoscopy.  -Gastric sleeve 2018 but patient states anemia predates her bariatric surgery -Requiring iron infusions last year.  -8/10/2018 h/h , iron 20.  -Ferrous sulfate 325 mg TID, repeat labs 1 month.    • Microcytic anemia         Past Surgical History:   Procedure Laterality Date   • CERVICAL CERCLAGE     • COLONOSCOPY     • D&C WITH SUCTION     • GASTRIC SLEEVE LAPAROSCOPIC N/A 2017    Procedure: GASTRIC SLEEVE LAPAROSCOPY   • KNEE MENISCECTOMY Right 2018   • LAPAROSCOPIC TUBAL LIGATION     • [place] LAP,ESOPHAGOGAST FUNDOPLASTY N/A 2017    Procedure: HIATAL HERNIA REPAIR LAPAROSCOPIC;  Surgeon: Eulalio Dick MD   • WISDOM TOOTH EXTRACTION           PT Assessment/Plan     Row Name 20 9736          PT Assessment    Assessment Comments  Client was able to perform low level strengthening today, but with some soreness. She had tightness with ASTYM and felt a little better afterward.  -MM        PT Plan    PT Plan  Comments  Cont per plan of treatment.   -MM       User Key  (r) = Recorded By, (t) = Taken By, (c) = Cosigned By    Initials Name Provider Type    Ramón Alegria PT Physical Therapist        OP Exercises     Row Name 11/05/20 1430             Subjective Comments    Subjective Comments  Client reports some soreness today more on the right than the left.   -MM         Subjective Pain    Able to rate subjective pain?  yes  -MM      Pre-Treatment Pain Level  3  -MM      Post-Treatment Pain Level  2  -MM         Total Minutes    38149 - PT Therapeutic Exercise Minutes  18  -MM      88570 - PT Manual Therapy Minutes  12  -MM         Exercise 1    Exercise Name 1  CC bar: chest press/rows/upright row/overhead press  -MM      Reps 1  15/15/15/15  -MM      Additional Comments  2 pl  -MM         Exercise 2    Exercise Name 2  sweeps w CC 3 pl  -MM      Reps 2  15  -MM         Exercise 3    Exercise Name 3  neck stretches: extension w towel, rotation bilateral  -MM      Reps 3  10/10/10  -MM        User Key  (r) = Recorded By, (t) = Taken By, (c) = Cosigned By    Initials Name Provider Type    Ramón Alegria, PT Physical Therapist              Manual Rx (last 36 hours)      Manual Treatments     Row Name 11/05/20 1430             Total Minutes    33194 - PT Manual Therapy Minutes  12  -MM         Manual Rx 1    Manual Rx 1 Location  cervical paravertebrals, bilateral upper trap/mid trap  -MM      Manual Rx 1 Type  Provided soft tissue mobilization using ASTYM technique.  Client was positioned prone moderate pressure was used with ASTYM.  -MM      Manual Rx 1 Duration  12  -MM        User Key  (r) = Recorded By, (t) = Taken By, (c) = Cosigned By    Initials Name Provider Type    Ramón Alegria, PT Physical Therapist          Time Calculation:      Therapy Charges for Today     Code Description Service Date Service Provider Modifiers Qty    30814446708  PT THER PROC EA 15 MIN 11/5/2020 Ramón Turpin, PT  GP 1    [id number]  PT MANUAL THERAPY EA 15 MIN 11/5/2020 Ramón Turpin, PT GP 1        Ramón Turpin, PT  11/5/2020

## 2020-11-07 ENCOUNTER — APPOINTMENT (OUTPATIENT)
Dept: MAMMOGRAPHY | Facility: HOSPITAL | Age: 50
End: 2020-11-07

## 2020-11-09 ENCOUNTER — HOSPITAL ENCOUNTER (OUTPATIENT)
Dept: PHYSICAL THERAPY | Facility: HOSPITAL | Age: 50
Setting detail: THERAPIES SERIES
Discharge: HOME | End: 2020-11-09

## 2020-11-09 DIAGNOSIS — S13.4XXA WHIPLASH INJURY TO NECK, INITIAL ENCOUNTER: ICD-10-CM

## 2020-11-09 DIAGNOSIS — G44.319 ACUTE POST-TRAUMATIC HEADACHE, NOT INTRACTABLE: ICD-10-CM

## 2020-11-09 DIAGNOSIS — M54.2 NECK PAIN: Primary | ICD-10-CM

## 2020-11-09 PROCEDURE — 97110 THERAPEUTIC EXERCISES: CPT

## 2020-11-09 PROCEDURE — 97140 MANUAL THERAPY 1/> REGIONS: CPT

## 2020-11-09 NOTE — THERAPY TREATMENT NOTE
Outpatient Physical Therapy Ortho Treatment Note  [facility]     Patient Name: Laure Valentin  : 1970  MRN: [id number]  Today's Date: 2020      Visit Date: 2020    Visit Dx:    ICD-10-CM ICD-9-CM   1. Neck pain  M54.2 723.1   2. Whiplash injury to neck, initial encounter  S13.4XXA 847.0   3. Acute post-traumatic headache, not intractable  G44.319 339.21       Patient Active Problem List   Diagnosis   • Annual GYN exam w/o problems   • Vitamin D insufficiency   • Knee pain, bilateral   • Mirena   • Class 1 obesity due to excess calories with body mass index (BMI) of 33.0 to 33.9 in adult   • History of colonoscopy        Past Medical History:   Diagnosis Date   • GERD (gastroesophageal reflux disease)     resolved w/ WLS   • History of kidney stones     most recent 17   • History of transfusion of 4 units of packed RBC 2019   • Intramural uterine fibroid    • Iron deficiency anemia 10/4/2017    -Has had colonoscopy.  -Gastric sleeve 2018 but patient states anemia predates her bariatric surgery -Requiring iron infusions last year.  -8/10/2018 h/h , iron 20.  -Ferrous sulfate 325 mg TID, repeat labs 1 month.    • Microcytic anemia         Past Surgical History:   Procedure Laterality Date   • CERVICAL CERCLAGE     • COLONOSCOPY     • D&C WITH SUCTION     • GASTRIC SLEEVE LAPAROSCOPIC N/A 2017    Procedure: GASTRIC SLEEVE LAPAROSCOPY   • KNEE MENISCECTOMY Right 2018   • LAPAROSCOPIC TUBAL LIGATION     • [place] LAP,ESOPHAGOGAST FUNDOPLASTY N/A 2017    Procedure: HIATAL HERNIA REPAIR LAPAROSCOPIC;  Surgeon: Eulalio Dick MD   • WISDOM TOOTH EXTRACTION         PT Assessment/Plan     Row Name 20 1530          PT Assessment    Assessment Comments  Overall symptoms are improving. Today she did not have pain with resistance exercises. She had a little soreness with neck stretches.   -MM        PT Plan    PT Plan Comments  Continue  per plan of treatment.  -MM       User Key  (r) = Recorded By, (t) = Taken By, (c) = Cosigned By    Initials Name Provider Type    Ramón Alegria, PT Physical Therapist        OP Exercises     Row Name 11/09/20 1530             Subjective Comments    Subjective Comments  Client reports feeling much better overall. Minimal discomfort today. No tingling sensation today. Mild soreness with neck extension.  -MM         Subjective Pain    Able to rate subjective pain?  yes  -MM      Pre-Treatment Pain Level  1  -MM      Post-Treatment Pain Level  1  -MM         Total Minutes    74920 - PT Therapeutic Exercise Minutes  15  -MM      92284 - PT Manual Therapy Minutes  12  -MM         Exercise 1    Exercise Name 1  CC bar: chest press/rows/upright row/overhead press  -MM      Reps 1  15/15/15/15  -MM      Additional Comments  3 pl  -MM         Exercise 2    Exercise Name 2  tricep pushdown  -MM      Reps 2  15  -MM      Additional Comments  CC 2 pl  -MM         Exercise 3    Exercise Name 3  neck stretches: extension w towel, rotation bilateral  -MM      Reps 3  10/10/10  -MM        User Key  (r) = Recorded By, (t) = Taken By, (c) = Cosigned By    Initials Name Provider Type    Ramón Alegria, PT Physical Therapist              Manual Rx (last 36 hours)      Manual Treatments     Row Name 11/09/20 1530             Total Minutes    45275 - PT Manual Therapy Minutes  12  -MM         Manual Rx 1    Manual Rx 1 Location  cervical paravertebrals, bilateral upper trap/mid trap  -MM      Manual Rx 1 Type  Provided soft tissue mobilization using ASTYM technique.  Client was positioned prone moderate pressure was used with ASTYM.  -MM      Manual Rx 1 Duration  12  -MM        User Key  (r) = Recorded By, (t) = Taken By, (c) = Cosigned By    Initials Name Provider Type    Ramón Alegria, PT Physical Therapist      Time Calculation:   Start Time: 1530  Therapy Charges for Today     Code Description Service Date  Service Provider Modifiers Qty    19415164734  PT THER PROC EA 15 MIN 11/9/2020 Ramón Turpin, PT GP 1    [id number]  PT MANUAL THERAPY EA 15 MIN 11/9/2020 Ramón Turpin, PT GP 1      Ramón Turpin, PT  11/9/2020

## 2020-11-10 ENCOUNTER — TELEPHONE (OUTPATIENT)
Dept: FAMILY MEDICINE CLINIC | Facility: CLINIC | Age: 50
End: 2020-11-10

## 2020-11-10 NOTE — TELEPHONE ENCOUNTER
PATIENT STATES RAGHAV MENTIONED CONCUSSION ON APPOINTMENT 10/21/2020 BUT THERE IS NO MENTION IN THE OFFICE NOTES.  IF THAT WERE IN THE OFFICE NOTES SHE COULD GET HER [employer] INSURANCE TO PAY THE REMAINING BALANCE AND SHE WOULD LIKE THAT ADDED IF POSSIBLE.  IF SHE NEEDS ANOTHER APPOINTMENT SHE IS OKAY WITH THAT.     BEST CALL NUMBER [phone]

## 2020-11-13 ENCOUNTER — TELEPHONE (OUTPATIENT)
Dept: FAMILY MEDICINE CLINIC | Facility: CLINIC | Age: 50
End: 2020-11-13

## 2020-11-13 ENCOUNTER — HOSPITAL ENCOUNTER (OUTPATIENT)
Dept: MAMMOGRAPHY | Facility: HOSPITAL | Age: 50
Discharge: HOME | End: 2020-11-13

## 2020-11-13 DIAGNOSIS — Z12.31 ENCOUNTER FOR SCREENING MAMMOGRAM FOR MALIGNANT NEOPLASM OF BREAST: ICD-10-CM

## 2020-11-13 NOTE — TELEPHONE ENCOUNTER
If she has new lumps or changes, we would most likely need an office visit then order the mammogram. If the areas in question are unchanged, I will go ahead and order the diagnostic mammogram.

## 2020-11-13 NOTE — TELEPHONE ENCOUNTER
Contacted patient, she stated that she reported the lump to the diagnostic center because it is not a new lump. I scheduled an appt with Hugo 11/17 so she can discuss further and reschedule a mammo

## 2020-11-13 NOTE — TELEPHONE ENCOUNTER
Patient states that she had an appointment for a Mammogram, but when she was asked if she had lumps in her breast, she said yes that she has fibrocystic disease and they wouldn't perform this test.  She will now have to have a Diagnostic Mammogram scheduled.  She can be reached at [phone]

## 2020-11-13 NOTE — TELEPHONE ENCOUNTER
Deanna from  breast imaging said Pt just finished her imaging today and they have discovered a new lump. They are requesting an order for a diagnostic mammogram. Please advise

## 2020-11-16 ENCOUNTER — HOSPITAL ENCOUNTER (OUTPATIENT)
Dept: PHYSICAL THERAPY | Facility: HOSPITAL | Age: 50
Setting detail: THERAPIES SERIES
Discharge: HOME | End: 2020-11-16

## 2020-11-16 DIAGNOSIS — S13.4XXA WHIPLASH INJURY TO NECK, INITIAL ENCOUNTER: ICD-10-CM

## 2020-11-16 DIAGNOSIS — M25.512 ACUTE PAIN OF LEFT SHOULDER: ICD-10-CM

## 2020-11-16 DIAGNOSIS — M54.2 NECK PAIN: Primary | ICD-10-CM

## 2020-11-16 DIAGNOSIS — G44.319 ACUTE POST-TRAUMATIC HEADACHE, NOT INTRACTABLE: ICD-10-CM

## 2020-11-16 PROCEDURE — 97140 MANUAL THERAPY 1/> REGIONS: CPT

## 2020-11-16 PROCEDURE — 97110 THERAPEUTIC EXERCISES: CPT

## 2020-11-16 NOTE — THERAPY TREATMENT NOTE
Outpatient Physical Therapy Ortho Treatment Note  [facility]     Patient Name: Laure Valentin  : 1970  MRN: [id number]  Today's Date: 2020      Visit Date: 2020    Visit Dx:    ICD-10-CM ICD-9-CM   1. Neck pain  M54.2 723.1   2. Whiplash injury to neck, initial encounter  S13.4XXA 847.0   3. Acute post-traumatic headache, not intractable  G44.319 339.21   4. Acute pain of left shoulder  M25.512 719.41       Patient Active Problem List   Diagnosis   • Annual GYN exam w/o problems   • Vitamin D insufficiency   • Knee pain, bilateral   • Mirena   • Class 1 obesity due to excess calories with body mass index (BMI) of 33.0 to 33.9 in adult   • History of colonoscopy        Past Medical History:   Diagnosis Date   • GERD (gastroesophageal reflux disease)     resolved w/ WLS   • History of kidney stones     most recent 17   • History of transfusion of 4 units of packed RBC 2019   • Intramural uterine fibroid    • Iron deficiency anemia 10/4/2017    -Has had colonoscopy.  -Gastric sleeve 2018 but patient states anemia predates her bariatric surgery -Requiring iron infusions last year.  -8/10/2018 h/h , iron 20.  -Ferrous sulfate 325 mg TID, repeat labs 1 month.    • Microcytic anemia         Past Surgical History:   Procedure Laterality Date   • CERVICAL CERCLAGE     • COLONOSCOPY     • D&C WITH SUCTION     • GASTRIC SLEEVE LAPAROSCOPIC N/A 2017    Procedure: GASTRIC SLEEVE LAPAROSCOPY   • KNEE MENISCECTOMY Right 2018   • LAPAROSCOPIC TUBAL LIGATION     • [place] LAP,ESOPHAGOGAST FUNDOPLASTY N/A 2017    Procedure: HIATAL HERNIA REPAIR LAPAROSCOPIC;  Surgeon: Eulalio Dick MD   • WISDOM TOOTH EXTRACTION       PT Assessment/Plan     Row Name 20 6210          PT Assessment    Assessment Comments  client continues to make good progress. Symptoms are mild now.   -MM        PT Plan    PT Plan Comments  Continue at 1x/week.   -MM        User Key  (r) = Recorded By, (t) = Taken By, (c) = Cosigned By    Initials Name Provider Type    Ramón Alegria, PT Physical Therapist        OP Exercises     Row Name 11/16/20 1430             Subjective Comments    Subjective Comments  Client reports feeling much better overall, but she continues with mild discomfort/tingling Left upper mid trap region.  -MM         Subjective Pain    Able to rate subjective pain?  yes  -MM      Pre-Treatment Pain Level  1  -MM      Post-Treatment Pain Level  1  -MM         Total Minutes    81677 - PT Therapeutic Exercise Minutes  15  -MM      86980 - PT Manual Therapy Minutes  14  -MM         Exercise 1    Exercise Name 1  CC bar: chest press/rows/upright row/overhead press  -MM      Reps 1  15/15/15/15  -MM      Additional Comments  3 pl  -MM         Exercise 2    Exercise Name 2  tricep pushdown  -MM      Reps 2  15  -MM      Additional Comments  2 pl  -MM         Exercise 3    Exercise Name 3  DB: flexion 4#/ abduction 4#/ robbery  -MM      Reps 3  10/10/10  -MM         Exercise 4    Exercise Name 4  pendulums  -MM      Reps 4  10  -MM        User Key  (r) = Recorded By, (t) = Taken By, (c) = Cosigned By    Initials Name Provider Type    Ramón Alegria, PT Physical Therapist            Manual Rx (last 36 hours)      Manual Treatments     Row Name 11/16/20 1430             Total Minutes    58690 - PT Manual Therapy Minutes  14  -MM         Manual Rx 1    Manual Rx 1 Location  cervical paravertebrals, bilateral upper trap/mid trap  -MM      Manual Rx 1 Type  Provided soft tissue mobilization using ASTYM technique.  Client was positioned prone moderate pressure was used with ASTYM.  -MM      Manual Rx 1 Duration  14  -MM        User Key  (r) = Recorded By, (t) = Taken By, (c) = Cosigned By    Initials Name Provider Type    Ramón Alegria, PT Physical Therapist        Time Calculation:   Start Time: 1430  Therapy Charges for Today     Code Description Service  Date Service Provider Modifiers Qty    05630263813  PT THER PROC EA 15 MIN 11/16/2020 Ramón Turpin, PT GP 1    [id number]  PT MANUAL THERAPY EA 15 MIN 11/16/2020 Ramón Turpin, PT GP 1        Ramón Turpin, PT  11/16/2020

## 2020-11-17 ENCOUNTER — OFFICE VISIT (OUTPATIENT)
Dept: FAMILY MEDICINE CLINIC | Facility: CLINIC | Age: 50
End: 2020-11-17

## 2020-11-17 VITALS
WEIGHT: 193 LBS | HEIGHT: 63 IN | HEART RATE: 74 BPM | DIASTOLIC BLOOD PRESSURE: 78 MMHG | OXYGEN SATURATION: 99 % | BODY MASS INDEX: 34.2 KG/M2 | SYSTOLIC BLOOD PRESSURE: 118 MMHG

## 2020-11-17 DIAGNOSIS — N63.0 BREAST LUMP: Primary | ICD-10-CM

## 2020-11-17 DIAGNOSIS — N60.19 FIBROCYSTIC BREAST DISEASE (FCBD), UNSPECIFIED LATERALITY: ICD-10-CM

## 2020-11-17 PROCEDURE — 99213 OFFICE O/P EST LOW 20 MIN: CPT | Performed by: PHYSICIAN ASSISTANT

## 2020-11-17 NOTE — PROGRESS NOTES
"    Chief Complaint   Patient presents with   • Breast Problem     needs referral for mammogram       HPI     Laure Valentin is a pleasant 49 y.o. female who presents for evaluation of \"chief complaint.\" She states she presented for a routine screening mammogram last week when she mentioned she had noticed a right breast lump in the last couple of weeks. She is unsure if this is new. It has not changed in size and is not tender to the touch. She has a history of fibrocystic breast disease and reports she feels lumps all the time. She otherwise feels well. She denies a history of breast biopsy.    Past Medical History:   Diagnosis Date   • GERD (gastroesophageal reflux disease)     resolved w/ WLS   • History of kidney stones 2011    most recent 12/24/17   • History of transfusion of 4 units of packed RBC 05/2019   • Intramural uterine fibroid 2018   • Iron deficiency anemia 10/4/2017    -Has had colonoscopy.  -Gastric sleeve 1/2018 but patient states anemia predates her bariatric surgery -Requiring iron infusions last year.  -8/10/2018 h/h 9/29, iron 20.  -Ferrous sulfate 325 mg TID, repeat labs 1 month.    • Microcytic anemia        Past Surgical History:   Procedure Laterality Date   • CERVICAL CERCLAGE  2001   • COLONOSCOPY  2011   • D&C WITH SUCTION  1997   • GASTRIC SLEEVE LAPAROSCOPIC N/A 1/26/2017    Procedure: GASTRIC SLEEVE LAPAROSCOPY   • KNEE MENISCECTOMY Right 12/2018   • LAPAROSCOPIC TUBAL LIGATION  2002   • [place] LAP,ESOPHAGOGAST FUNDOPLASTY N/A 1/26/2017    Procedure: HIATAL HERNIA REPAIR LAPAROSCOPIC;  Surgeon: Eulalio Dick MD   • WISDOM TOOTH EXTRACTION  1992       Family History   Problem Relation Age of Onset   • Hypertension Mother    • Hyperlipidemia Mother    • Osteoarthritis Mother    • No Known Problems Father    • Breast cancer Neg Hx    • Ovarian cancer Neg Hx        Social History     Socioeconomic History   • Marital status:      Spouse name: Not on file   • Number of " children: Not on file   • Years of education: Not on file   • Highest education level: Not on file   Tobacco Use   • Smoking status: Never Smoker   • Smokeless tobacco: Never Used   Substance and Sexual Activity   • Alcohol use: No   • Drug use: No   • Sexual activity: Defer       No Known Allergies    ROS    Review of Systems   Constitutional: Negative for fatigue and unexpected weight loss.   Genitourinary: Positive for breast lump. Negative for breast pain.       Vitals:    11/17/20 1528   BP: 118/78   Pulse: 74   SpO2: 99%     Body mass index is 34.19 kg/m².      Current Outpatient Medications:   •  cyclobenzaprine (FLEXERIL) 10 MG tablet, Take 0.5-1 tablets by mouth 3 (Three) Times a Day As Needed for Muscle Spasms., Disp: 30 tablet, Rfl: 0  •  diclofenac (VOLTAREN) 1 % gel gel, Apply 4 g topically to the appropriate area as directed 4 (Four) Times a Day., Disp: 100 g, Rfl: 5  •  ferrous sulfate 325 (65 FE) MG tablet, Take 1 tablet by mouth Daily., Disp: 90 tablet, Rfl: 1  •  levonorgestrel (MIRENA) 20 MCG/24HR IUD, , Disp: , Rfl:   •  Multiple Vitamins-Minerals (MULTIVITAMIN ADULT PO), Take 1 tablet by mouth Daily., Disp: , Rfl:   •  TURMERIC PO, Take  by mouth., Disp: , Rfl:     PE    Physical Exam  Vitals signs reviewed. Exam conducted with a chaperone present.   Constitutional:       General: She is not in acute distress.  Pulmonary:      Effort: Pulmonary effort is normal. No respiratory distress.   Chest:          Comments: Chaperone: Mary  Lymphadenopathy:      Upper Body:      Right upper body: No axillary adenopathy.      Left upper body: No axillary adenopathy.   Neurological:      Mental Status: She is alert.   Psychiatric:         Mood and Affect: Mood normal.          A/P    Problem List Items Addressed This Visit     None      Visit Diagnoses     Breast lump    -  Primary  -Hx of fibrocystic breast disease  -Will order diagnostic mammogram for further evaluation of right and left breast lumps  present on exam    Relevant Orders    Mammo Diagnostic Digital Tomosynthesis Bilateral With CAD    Fibrocystic breast disease (FCBD), unspecified laterality              Plan of care was reviewed with patient at the conclusion of today's visit. Education was provided regarding diagnoses, management, prescribed or recommended OTC products, and the importance of compliance with follow-up appointments. The patient was counseled regarding the risks, benefits, and possible side-effects of treatment. I advised the patient to keep me informed of any acute changes in their status including new, worsening, or persistent symptoms. Patient expresses understanding and agreement with the management plan.        JAMES Guerrero  Answers for HPI/ROS submitted by the patient on 11/17/2020   What is the primary reason for your visit?: Other  Please describe your symptoms.: Mammogram referal  Have you had these symptoms before?: Yes  How long have you been having these symptoms?: Greater than 2 weeks

## 2020-12-01 ENCOUNTER — HOSPITAL ENCOUNTER (OUTPATIENT)
Dept: PHYSICAL THERAPY | Facility: HOSPITAL | Age: 50
Setting detail: THERAPIES SERIES
Discharge: HOME | End: 2020-12-01

## 2020-12-01 DIAGNOSIS — G44.319 ACUTE POST-TRAUMATIC HEADACHE, NOT INTRACTABLE: ICD-10-CM

## 2020-12-01 DIAGNOSIS — S13.4XXA WHIPLASH INJURY TO NECK, INITIAL ENCOUNTER: ICD-10-CM

## 2020-12-01 DIAGNOSIS — M54.2 NECK PAIN: Primary | ICD-10-CM

## 2020-12-01 PROCEDURE — 97140 MANUAL THERAPY 1/> REGIONS: CPT

## 2020-12-01 PROCEDURE — 97110 THERAPEUTIC EXERCISES: CPT

## 2020-12-01 NOTE — THERAPY PROGRESS REPORT/RE-CERT
Outpatient Physical Therapy Ortho Progress Note  [facility]     Patient Name: Laure Valentin  : 1970  MRN: [id number]  Today's Date: 2020      Visit Date: 2020    Visit Dx:    ICD-10-CM ICD-9-CM   1. Neck pain  M54.2 723.1   2. Whiplash injury to neck, initial encounter  S13.4XXA 847.0   3. Acute post-traumatic headache, not intractable  G44.319 339.21       Patient Active Problem List   Diagnosis   • Annual GYN exam w/o problems   • Vitamin D insufficiency   • Knee pain, bilateral   • Mirena   • Class 1 obesity due to excess calories with body mass index (BMI) of 33.0 to 33.9 in adult   • History of colonoscopy        Past Medical History:   Diagnosis Date   • GERD (gastroesophageal reflux disease)     resolved w/ WLS   • History of kidney stones     most recent 17   • History of transfusion of 4 units of packed RBC 2019   • Intramural uterine fibroid    • Iron deficiency anemia 10/4/2017    -Has had colonoscopy.  -Gastric sleeve 2018 but patient states anemia predates her bariatric surgery -Requiring iron infusions last year.  -8/10/2018 h/h , iron 20.  -Ferrous sulfate 325 mg TID, repeat labs 1 month.    • Microcytic anemia         Past Surgical History:   Procedure Laterality Date   • CERVICAL CERCLAGE     • COLONOSCOPY     • D&C WITH SUCTION     • GASTRIC SLEEVE LAPAROSCOPIC N/A 2017    Procedure: GASTRIC SLEEVE LAPAROSCOPY   • KNEE MENISCECTOMY Right 2018   • LAPAROSCOPIC TUBAL LIGATION     • [place] LAP,ESOPHAGOGAST FUNDOPLASTY N/A 2017    Procedure: HIATAL HERNIA REPAIR LAPAROSCOPIC;  Surgeon: Eulalio Dick MD   • WISDOM TOOTH EXTRACTION         PT Ortho     Row Name 20 1862       Subjective Comments    Subjective Comments  Client reports less pain overall. She continues now with intermittent pain and tightness into right upper trap. She also notices occaisional tingling.   -MM       Subjective Pain    Able to rate  subjective pain?  yes  -MM    Pre-Treatment Pain Level  1  -MM    Post-Treatment Pain Level  0  -MM       Head/Neck/Trunk    Neck Extension AROM  54 with some soreness on the right  -MM    Neck Flexion AROM  WNL  -MM    Neck Lt Lateral Flexion AROM  28 with tightness on right  -MM    Neck Rt Lateral Flexion AROM  38  -MM    Neck Lt Rotation AROM  62  -MM    Neck Rt Rotation AROM  63  -MM      User Key  (r) = Recorded By, (t) = Taken By, (c) = Cosigned By    Initials Name Provider Type    MM Ramón Turpin, PT Physical Therapist        PT Assessment/Plan     Row Name 12/01/20 1430          PT Assessment    Functional Limitations  Limitation in home management;Limitations in community activities;Performance in leisure activities;Performance in self-care ADL  -MM     Impairments  Muscle strength;Pain;Range of motion;Joint mobility  -MM     Assessment Comments  Client is making good progress. She continues now with intermittent pain into right upper trap. Some burning/tingling discomfort is noted at times. Neck Disability score improved from 14 to 7. Neck mobility was limited some today due to tightness right upper trap. Further skilled care is required to normalize pain.  -MM     Please refer to paper survey for additional self-reported information  Yes  -MM     Rehab Potential  Good  -MM     Patient/caregiver participated in establishment of treatment plan and goals  Yes  -MM     Patient would benefit from skilled therapy intervention  Yes  -MM        PT Plan    PT Frequency  1x/week;2x/week  -MM     Predicted Duration of Therapy Intervention (PT)  2-4 visits  -MM     Planned CPT's?  PT THER PROC EA 15 MIN: 23646;PT THER ACT EA 15 MIN: 44380;PT MANUAL THERAPY EA 15 MIN: 36466;PT HOT/COLD PACK WC NONMCARE: 26347  -MM     PT Plan Comments  Continue at 1x/week at this time. Continue manual therapy and exercises.  -MM       User Key  (r) = Recorded By, (t) = Taken By, (c) = Cosigned By    Initials Name Provider Type     Ramón Alegria, PT Physical Therapist         OP Exercises     Row Name 12/01/20 1430             Subjective Comments    Subjective Comments  Client reports less pain overall. She continues now with intermittent pain and tightness into right upper trap. She also notices occaisional tingling.   -MM         Subjective Pain    Able to rate subjective pain?  yes  -MM      Pre-Treatment Pain Level  1  -MM      Post-Treatment Pain Level  0  -MM         Total Minutes    64742 - PT Therapeutic Exercise Minutes  15  -MM      91472 - PT Manual Therapy Minutes  15  -MM         Exercise 1    Exercise Name 1  Exercises included: neck extension with towel, neck rotation with towel, lateral flexion with towel, upper trap stretch, band resisted scaption, band resisted horizontal abduction, and band resisted shoulder rolls.   -MM      Cueing 1  Verbal  -MM      Time 1  15  -MM      Additional Comments  ther ex  -MM        User Key  (r) = Recorded By, (t) = Taken By, (c) = Cosigned By    Initials Name Provider Type    Ramón Alegria, PT Physical Therapist           Manual Rx (last 36 hours)      Manual Treatments     Row Name 12/01/20 1430             Total Minutes    85577 - PT Manual Therapy Minutes  15  -MM         Manual Rx 1    Manual Rx 1 Location  cervical paravertebrals, bilateral upper trap/mid trap  -MM      Manual Rx 1 Type  Provided soft tissue mobilization using ASTYM technique.  Client was positioned prone moderate pressure was used with ASTYM.  -MM      Manual Rx 1 Duration  15  -MM        User Key  (r) = Recorded By, (t) = Taken By, (c) = Cosigned By    Initials Name Provider Type    Ramón Alegria, PT Physical Therapist        PT OP Goals     Row Name 12/01/20 1430          PT Short Term Goals    STG Date to Achieve  11/13/20  -MM     STG 1  Client will report resolution of neck pain with daily activity.  -MM     STG 1 Progress  Ongoing  -MM     STG 1 Progress Comments  good progress  -MM     STG 2   Client will report resolution of headaches/dizziness with daily activity.  -MM     STG 2 Progress  Met  -MM     STG 3  Neck rotation AROM will improve to 65 degrees bilateral without soreness.  -MM     STG 3 Progress  Ongoing  -MM     STG 4  Bilateral upper trap tenderness will resolve.   -MM     STG 4 Progress  Ongoing  -MM     STG 4 Progress Comments  good progress  -MM        Long Term Goals    LTG Date to Achieve  12/04/20  -MM     LTG 1  Neck Disability score will improve to 8% or better.   -MM     LTG 1 Progress  Ongoing  -MM     LTG 1 Progress Comments  good progress  -MM     LTG 2  Client will perform regular computer work without difficulty.  -MM     LTG 2 Progress  Ongoing  -MM        Time Calculation    PT Goal Re-Cert Due Date  01/21/21  -MM       User Key  (r) = Recorded By, (t) = Taken By, (c) = Cosigned By    Initials Name Provider Type    Ramón Alegria, PT Physical Therapist        Outcome Measure Options: Neck Disability Index (NDI)  Neck Disability Index  Section 1 - Pain Intensity: The pain is moderate at the moment.  Section 2 - Personal Care: I can look after myself normally without causing extra pain.  Section 3 - Lifting: I can lift heavy weights, but it gives me extra pain.  Section 4 - Work: I can do as much work as I want.  Section 5 - Headaches: I have moderate headaches that come infrequently.  Section 6 - Concentration: I can concentrate fully with slight difficulty.  Section 7 - Sleeping: I have no trouble sleeping.  Section 8 - Driving: I can drive my car without neck pain  Section 9 - Reading: I can read as much as I want with no neck pain.  Section 10 - Recreation: I have some neck pain with all recreational activities.  Neck Disability Index Score: 7      Time Calculation:   Start Time: 1430  Therapy Charges for Today     Code Description Service Date Service Provider Modifiers Qty    41579248028 HC PT THER PROC EA 15 MIN 12/1/2020 Ramón Turpin, PT GP 1    [id number]   PT MANUAL THERAPY EA 15 MIN 12/1/2020 Ramón Turpin, PT GP 1          PT G-Codes  Outcome Measure Options: Neck Disability Index (NDI)  Neck Disability Index Score: 7         Raómn Turpin, PT  12/1/2020

## 2020-12-09 ENCOUNTER — HOSPITAL ENCOUNTER (OUTPATIENT)
Dept: PHYSICAL THERAPY | Facility: HOSPITAL | Age: 50
Setting detail: THERAPIES SERIES
Discharge: HOME | End: 2020-12-09

## 2020-12-09 DIAGNOSIS — M54.2 NECK PAIN: Primary | ICD-10-CM

## 2020-12-09 DIAGNOSIS — G44.319 ACUTE POST-TRAUMATIC HEADACHE, NOT INTRACTABLE: ICD-10-CM

## 2020-12-09 DIAGNOSIS — S13.4XXA WHIPLASH INJURY TO NECK, INITIAL ENCOUNTER: ICD-10-CM

## 2020-12-09 PROCEDURE — 97110 THERAPEUTIC EXERCISES: CPT

## 2020-12-09 PROCEDURE — 97140 MANUAL THERAPY 1/> REGIONS: CPT

## 2020-12-09 NOTE — THERAPY TREATMENT NOTE
Outpatient Physical Therapy Ortho Treatment Note  [facility]     Patient Name: Laure Valentin  : 1970  MRN: [id number]  Today's Date: 2020      Visit Date: 2020    Visit Dx:    ICD-10-CM ICD-9-CM   1. Neck pain  M54.2 723.1   2. Whiplash injury to neck, initial encounter  S13.4XXA 847.0   3. Acute post-traumatic headache, not intractable  G44.319 339.21       Patient Active Problem List   Diagnosis   • Annual GYN exam w/o problems   • Vitamin D insufficiency   • Knee pain, bilateral   • Mirena   • Class 1 obesity due to excess calories with body mass index (BMI) of 33.0 to 33.9 in adult   • History of colonoscopy        Past Medical History:   Diagnosis Date   • GERD (gastroesophageal reflux disease)     resolved w/ WLS   • History of kidney stones     most recent 17   • History of transfusion of 4 units of packed RBC 2019   • Intramural uterine fibroid    • Iron deficiency anemia 10/4/2017    -Has had colonoscopy.  -Gastric sleeve 2018 but patient states anemia predates her bariatric surgery -Requiring iron infusions last year.  -8/10/2018 h/h , iron 20.  -Ferrous sulfate 325 mg TID, repeat labs 1 month.    • Microcytic anemia         Past Surgical History:   Procedure Laterality Date   • CERVICAL CERCLAGE     • COLONOSCOPY     • D&C WITH SUCTION     • GASTRIC SLEEVE LAPAROSCOPIC N/A 2017    Procedure: GASTRIC SLEEVE LAPAROSCOPY   • KNEE MENISCECTOMY Right 2018   • LAPAROSCOPIC TUBAL LIGATION     • [place] LAP,ESOPHAGOGAST FUNDOPLASTY N/A 2017    Procedure: HIATAL HERNIA REPAIR LAPAROSCOPIC;  Surgeon: Eulalio Dick MD   • WISDOM TOOTH EXTRACTION         PT Assessment/Plan     Row Name 20 3735          PT Assessment    Assessment Comments  Client is feeling much better overall. Headaches are improving. Tightness today was minimal. No complaints with exercises today.  -MM        PT Plan    PT Plan Comments  Client will call  for follow up in the next few weeks if needed.  -MM       User Key  (r) = Recorded By, (t) = Taken By, (c) = Cosigned By    Initials Name Provider Type    Ramón Alegria, PT Physical Therapist          OP Exercises     Row Name 12/09/20 1430             Subjective Comments    Subjective Comments  Client reports that she has been feeling better overall. Neck tightness was minimal today. headaches have improved from 1x/week.   -MM         Subjective Pain    Able to rate subjective pain?  yes  -MM      Pre-Treatment Pain Level  1  -MM      Post-Treatment Pain Level  0  -MM         Total Minutes    52472 - PT Therapeutic Exercise Minutes  15  -MM      00971 - PT Manual Therapy Minutes  15  -MM         Exercise 1    Exercise Name 1  Exercises included: DB overhead press, flexion to 90, abduction to 90, robbery exercise, ER w scapular squeeze, shoulder rolls reverse, and thoracic extension over foam.   -MM      Cueing 1  Verbal  -MM      Time 1  15  -MM      Additional Comments  ther ex  -MM        User Key  (r) = Recorded By, (t) = Taken By, (c) = Cosigned By    Initials Name Provider Type    Ramón Alegria, PT Physical Therapist            Manual Rx (last 36 hours)      Manual Treatments     Row Name 12/09/20 1430             Total Minutes    70262 - PT Manual Therapy Minutes  15  -MM         Manual Rx 1    Manual Rx 1 Location  cervical paravertebrals, bilateral upper trap/mid trap  -MM      Manual Rx 1 Type  Provided soft tissue mobilization using ASTYM technique.  Client was positioned prone moderate pressure was used with ASTYM.  -MM      Manual Rx 1 Duration  15  -MM        User Key  (r) = Recorded By, (t) = Taken By, (c) = Cosigned By    Initials Name Provider Type    Ramón Alegria, PT Physical Therapist          Time Calculation:   Start Time: 1430  Therapy Charges for Today     Code Description Service Date Service Provider Modifiers Qty    72807642850  PT THER PROC EA 15 MIN 12/9/2020  Ramón Turpin, PT GP 1    [id number]  PT MANUAL THERAPY EA 15 MIN 12/9/2020 Ramón Turpin, PT GP 1        Ramón Turpin, PT  12/9/2020

## 2020-12-18 ENCOUNTER — HOSPITAL ENCOUNTER (OUTPATIENT)
Dept: MAMMOGRAPHY | Facility: HOSPITAL | Age: 50
Discharge: HOME | End: 2020-12-18
Admitting: PHYSICIAN ASSISTANT

## 2020-12-18 DIAGNOSIS — N63.0 BREAST LUMP: ICD-10-CM

## 2020-12-18 PROCEDURE — 77066 DX MAMMO INCL CAD BI: CPT | Performed by: RADIOLOGY

## 2020-12-18 PROCEDURE — G0279 TOMOSYNTHESIS, MAMMO: HCPCS

## 2020-12-18 PROCEDURE — 77066 DX MAMMO INCL CAD BI: CPT

## 2020-12-18 PROCEDURE — 77062 BREAST TOMOSYNTHESIS BI: CPT | Performed by: RADIOLOGY

## 2020-12-24 NOTE — PATIENT INSTRUCTIONS
Tdap Vaccine (Tetanus, Diphtheria and Pertussis): What You Need to Know      1. Why get vaccinated?  Tetanus, diphtheria and pertussis are very serious diseases. Tdap vaccine can protect us from these diseases. And, Tdap vaccine given to pregnant women can protect  babies against pertussis..  TETANUS (Lockjaw) is rare in the [place] today. It causes painful muscle tightening and stiffness, usually all over the body.  · It can lead to tightening of muscles in the head and neck so you can't open your mouth, swallow, or sometimes even breathe. Tetanus kills about 1 out of 10 people who are infected even after receiving the best medical care.  DIPHTHERIA is also rare in the [place] today. It can cause a thick coating to form in the back of the throat.  · It can lead to breathing problems, heart failure, paralysis, and death.  PERTUSSIS (Whooping Cough) causes severe coughing spells, which can cause difficulty breathing, vomiting and disturbed sleep.  · It can also lead to weight loss, incontinence, and rib fractures. Up to 2 in 100 adolescents and 5 in 100 adults with pertussis are hospitalized or have complications, which could include pneumonia or death.    These diseases are caused by bacteria. Diphtheria and pertussis are spread from person to person through secretions from coughing or sneezing. Tetanus enters the body through cuts, scratches, or wounds.  Before vaccines, as many as 200,000 cases of diphtheria, 200,000 cases of pertussis, and hundreds of cases of tetanus, were reported in the [place] each year. Since vaccination began, reports of cases for tetanus and diphtheria have dropped by about 99% and for pertussis by about 80%.    2. Tdap vaccine  • Tdap vaccine can protect adolescents and adults from tetanus, diphtheria, and pertussis. One dose of Tdap is routinely given at age 11 or 12. People who did not get Tdap at that age should get it as soon as possible.  • Tdap is especially  important for healthcare professionals and anyone having close contact with a baby younger than 12 months.  • Pregnant women should get a dose of Tdap during every pregnancy, to protect the  from pertussis. Infants are most at risk for severe, life-threatening complications from pertussis.  • Another vaccine, called Td, protects against tetanus and diphtheria, but not pertussis. A Td booster should be given every 10 years. Tdap may be given as one of these boosters if you have never gotten Tdap before. Tdap may also be given after a severe cut or burn to prevent tetanus infection.  • Your doctor or the person giving you the vaccine can give you more information.  • Tdap may safely be given at the same time as other vaccines.    3. Some people should not get this vaccine  · A person who has ever had a life-threatening allergic reaction after a previous dose of any diphtheria, tetanus or pertussis containing vaccine, OR has a severe allergy to any part of this vaccine, should not get Tdap vaccine. Tell the person giving the vaccine about any severe allergies.  · Anyone who had coma or long repeated seizures within 7 days after a childhood dose of DTP or DTaP, or a previous dose of Tdap, should not get Tdap, unless a cause other than the vaccine was found. They can still get Td.  · Talk to your doctor if you:  ? have seizures or another nervous system problem,  ? had severe pain or swelling after any vaccine containing diphtheria, tetanus or pertussis,  ? ever had a condition called Guillain-Barré Syndrome (GBS),  ? aren't feeling well on the day the shot is scheduled.    4. Risks  With any medicine, including vaccines, there is a chance of side effects. These are usually mild and go away on their own. Serious reactions are also possible but are rare.  Most people who get Tdap vaccine do not have any problems with it.  Mild problems following Tdap  (Did not interfere with activities)  · Pain where the shot was  given (about 3 in 4 adolescents or 2 in 3 adults)  · Redness or swelling where the shot was given (about 1 person in 5)  · Mild fever of at least 100.4°F (up to about 1 in 25 adolescents or 1 in 100 adults)  · Headache (about 3 or 4 people in 10)  · Tiredness (about 1 person in 3 or 4)  · Nausea, vomiting, diarrhea, stomach ache (up to 1 in 4 adolescents or 1 in 10 adults)  · Chills, sore joints (about 1 person in 10)  · Body aches (about 1 person in 3 or 4)  · Rash, swollen glands (uncommon)  Moderate problems following Tdap  (Interfered with activities, but did not require medical attention)  · Pain where the shot was given (up to 1 in 5 or 6)  · Redness or swelling where the shot was given (up to about 1 in 16 adolescents or 1 in 12 adults)  · Fever over 102°F (about 1 in 100 adolescents or 1 in 250 adults)  · Headache (about 1 in 7 adolescents or 1 in 10 adults)  · Nausea, vomiting, diarrhea, stomach ache (up to 1 or 3 people in 100)  · Swelling of the entire arm where the shot was given (up to about 1 in 500).  Severe problems following Tdap  (Unable to perform usual activities; required medical attention)  · Swelling, severe pain, bleeding and redness in the arm where the shot was given (rare).  Problems that could happen after any vaccine:  · People sometimes faint after a medical procedure, including vaccination. Sitting or lying down for about 15 minutes can help prevent fainting, and injuries caused by a fall. Tell your doctor if you feel dizzy, or have vision changes or ringing in the ears.  · Some people get severe pain in the shoulder and have difficulty moving the arm where a shot was given. This happens very rarely.  · Any medication can cause a severe allergic reaction. Such reactions from a vaccine are very rare, estimated at fewer than 1 in a million doses, and would happen within a few minutes to a few hours after the vaccination.  · As with any medicine, there is a very remote chance of a vaccine  causing a serious injury or death.  · The safety of vaccines is always being monitored. For more information, visit: www.cdc.gov/vaccinesafety/    5. What if there is a serious problem?  What should I look for?  · Look for anything that concerns you, such as signs of a severe allergic reaction, very high fever, or unusual behavior.  · Signs of a severe allergic reaction can include hives, swelling of the face and throat, difficulty breathing, a fast heartbeat, dizziness, and weakness. These would usually start a few minutes to a few hours after the vaccination.  What should I do?  · If you think it is a severe allergic reaction or other emergency that can't wait, call 9-1-1 or get the person to the nearest hospital. Otherwise, call your doctor.  · Afterward, the reaction should be reported to the Vaccine Adverse Event Reporting System (VAERS). Your doctor might file this report, or you can do it yourself through the VAERS web site at www.vaers.[facility].gov, or by calling 1-260.257.4983.  · VAERS does not give medical advice.    6. The National Vaccine Injury Compensation Program  The National Vaccine Injury Compensation Program (VICP) is a federal program that was created to compensate people who may have been injured by certain vaccines.  Persons who believe they may have been injured by a vaccine can learn about the program and about filing a claim by calling 1-105.280.2079 or visiting the VICP website at www.hrsa.gov/vaccinecompensation. There is a time limit to file a claim for compensation.    7. How can I learn more?  · Ask your doctor. He or she can give you the vaccine package insert or suggest other sources of information.  · Call your local or state health department.  · Contact the Centers for Disease Control and Prevention (CDC):  ? Call 1-442.814.3222 (3-567-VOI-INFO) or  ? Visit CDC's website at www.cdc.gov/vaccines      Vaccine Information Statement Tdap Vaccine (2/24/2015)  This information is not  intended to replace advice given to you by your health care provider. Make sure you discuss any questions you have with your health care provider.  Document Released: 06/18/2013 Document Revised: 08/05/2019 Document Reviewed: 08/05/2019  Elsevier Interactive Patient Education © 2019 [employer]

## 2020-12-24 NOTE — PROGRESS NOTES
Subjective   Chief Complaint   Patient presents with   • Gynecologic Exam     Laure Valentin is a 50 y.o. year old  presenting to be seen for her annual exam.  Continues to use the Aygestin periodically to help her cycles when it is prolonged but is way better than it has been historically.  Mirena was placed in 2019 for bleeding which has helped significantly.    SEXUAL Hx:  She is currently sexually active.  In the past year there there has been NO new sexual partners.    Condoms are never used.  She would not like to be screened for STD's at today's exam.  Current birth control method: tubal ligation.  She is happy with her current method of contraception and does not want to discuss alternative methods of contraception.  MENSTRUAL Hx:  Patient's last menstrual period was 2020.  In the past 6 months her cycles have been unpredictable infrequent.  Her menstrual flow is typically light.   Each month on average there are roughly 0 day(s) of very heavy flow.  Intermenstrual bleeding is absent.    Post-coital bleeding is absent.  Dysmenorrhea: is not affecting her activities of daily living  PMS: is not affecting her activities of daily living  Her cycles are not a source of concern for her that she wishes to discuss today.  HEALTH Hx:  She exercises regularly: yes.  She wears her seat belt: yes.  She has concerns about domestic violence: no.  OTHER THINGS SHE WANTS TO DISCUSS TODAY:  Nothing else    The following portions of the patient's history were reviewed and updated as appropriate:problem list, current medications, allergies, past family history, past medical history, past social history and past surgical history.    Social History    Tobacco Use      Smoking status: Never Smoker      Smokeless tobacco: Never Used    Review of Systems  Constitutional POS: nothing reported    NEG: anorexia or night sweats   Genitourinary POS: nothing reported    NEG: dysuria or hematuria       Gastointestinal POS: nothing reported    NEG: bloating, change in bowel habits, melena or reflux symptoms   Integument POS: nothing reported    NEG: moles that are changing in size, shape, color or rashes   Breast POS: nothing reported    NEG: persistent breast lump, skin dimpling or nipple discharge        Objective   /72   Resp 14   Wt 88 kg (194 lb)   LMP 12/14/2020   Breastfeeding No   BMI 34.37 kg/m²     General:  well developed; well nourished  no acute distress   Skin:  No suspicious lesions seen   Thyroid: normal to inspection and palpation   Breasts:  Examined in supine position  Symmetric without masses or skin dimpling  Nipples normal without inversion, lesions or discharge  There are no palpable axillary nodes   Abdomen: soft, non-tender; no masses  no umbilical or inguinal hernias are present  no hepato-splenomegaly   Pelvis: Clinical staff was present for exam  External genitalia:  normal appearance of the external genitalia including Bartholin's and Claremont's glands.  :  urethral meatus normal;  Vaginal:  normal pink mucosa without prolapse or lesions.  Cervix:  normal appearance. IUD string present - 1.5 cms in length;  Uterus:  normal size, shape and consistency.  Adnexa:  non palpable bilaterally.  Rectal:  digital rectal exam not performed; anus visually normal appearing.           Assessment   1. Normal GYN exam  2. Prior history of metrorrhagia improved with Mirena use     Plan   1. Pap was done today.  If she does not receive the results of the Pap within 2 weeks  time, she was instructed to call to find out the results.  I explained to Laure that the recommendations for Pap smear interval in a low risk patient's has lengthened to 3 years time.  I encouraged her to be seen yearly for a full physical exam including breast and pelvic exam even during the off years when PAP's will not be performed.  2. She was encouraged to get yearly mammograms.  She should report any palpable breast  lump(s) or skin changes regardless of mammographic findings.  I explained to Laure that notification regarding her mammogram results will come from the center performing the study.  Our office will not be routinely calling with mammogram results.  It is her responsibility to make sure that the results from the mammogram are communicated to her by the breast center.  If she has any questions about the results, she is welcome to call our office anytime.  3. I discussed with Laure that she may be behind on needed vaccinations for TDAP.  She may be able to obtain these vaccinations at her local pharmacy OR speak about obtaining them with her primary care.  If she does obtain her vaccines, I have asked Laure to let us know the date each vaccine was obtained so that her medical record could be updated in our system.  4. The importance of keeping all planned follow-up and taking all medications as prescribed was emphasized.  5. Follow up for annual exam 1 year           This note was electronically signed.    Maged Marcelo M.D.  December 29, 2020    Note: Speech recognition transcription software may have been used to create portions of this document.  An attempt at proofreading has been made but errors in transcription could still be present.

## 2020-12-29 ENCOUNTER — OFFICE VISIT (OUTPATIENT)
Dept: OBSTETRICS AND GYNECOLOGY | Facility: CLINIC | Age: 50
End: 2020-12-29

## 2020-12-29 VITALS
RESPIRATION RATE: 14 BRPM | SYSTOLIC BLOOD PRESSURE: 116 MMHG | BODY MASS INDEX: 34.37 KG/M2 | WEIGHT: 194 LBS | DIASTOLIC BLOOD PRESSURE: 72 MMHG

## 2020-12-29 DIAGNOSIS — Z01.419 WELL WOMAN EXAM WITH ROUTINE GYNECOLOGICAL EXAM: Primary | ICD-10-CM

## 2020-12-29 DIAGNOSIS — Z71.85 VACCINE COUNSELING: ICD-10-CM

## 2020-12-29 PROCEDURE — 99396 PREV VISIT EST AGE 40-64: CPT | Performed by: OBSTETRICS & GYNECOLOGY

## 2021-01-06 ENCOUNTER — IMMUNIZATION (OUTPATIENT)
Dept: VACCINE CLINIC | Facility: HOSPITAL | Age: 51
End: 2021-01-06

## 2021-01-06 PROCEDURE — 0001A: CPT | Performed by: INTERNAL MEDICINE

## 2021-01-06 PROCEDURE — 91300 HC SARSCOV02 VAC 30MCG/0.3ML IM: CPT | Performed by: INTERNAL MEDICINE

## 2021-01-26 ENCOUNTER — DOCUMENTATION (OUTPATIENT)
Dept: PHYSICAL THERAPY | Facility: HOSPITAL | Age: 51
End: 2021-01-26

## 2021-01-26 DIAGNOSIS — G44.319 ACUTE POST-TRAUMATIC HEADACHE, NOT INTRACTABLE: ICD-10-CM

## 2021-01-26 DIAGNOSIS — S13.4XXA WHIPLASH INJURY TO NECK, INITIAL ENCOUNTER: ICD-10-CM

## 2021-01-26 DIAGNOSIS — M54.2 NECK PAIN: Primary | ICD-10-CM
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
## 2021-01-27 ENCOUNTER — IMMUNIZATION (OUTPATIENT)
Dept: VACCINE CLINIC | Facility: HOSPITAL | Age: 51
End: 2021-01-27

## 2021-01-27 PROCEDURE — 0002A: CPT | Performed by: INTERNAL MEDICINE

## 2021-01-27 PROCEDURE — 91300 HC SARSCOV02 VAC 30MCG/0.3ML IM: CPT | Performed by: INTERNAL MEDICINE

## 2021-02-08 ENCOUNTER — TELEPHONE (OUTPATIENT)
Dept: FAMILY MEDICINE CLINIC | Facility: CLINIC | Age: 51
End: 2021-02-08

## 2021-02-19 ENCOUNTER — OFFICE VISIT (OUTPATIENT)
Dept: FAMILY MEDICINE CLINIC | Facility: CLINIC | Age: 51
End: 2021-02-19

## 2021-02-19 VITALS
BODY MASS INDEX: 34.94 KG/M2 | OXYGEN SATURATION: 98 % | SYSTOLIC BLOOD PRESSURE: 122 MMHG | HEART RATE: 76 BPM | WEIGHT: 197.2 LBS | DIASTOLIC BLOOD PRESSURE: 78 MMHG | HEIGHT: 63 IN

## 2021-02-19 DIAGNOSIS — E66.09 CLASS 1 OBESITY DUE TO EXCESS CALORIES WITH BODY MASS INDEX (BMI) OF 34.0 TO 34.9 IN ADULT, UNSPECIFIED WHETHER SERIOUS COMORBIDITY PRESENT: ICD-10-CM

## 2021-02-19 DIAGNOSIS — D50.0 IRON DEFICIENCY ANEMIA DUE TO CHRONIC BLOOD LOSS: Primary | ICD-10-CM

## 2021-02-19 DIAGNOSIS — M54.2 NECK PAIN: ICD-10-CM

## 2021-02-19 PROCEDURE — 99214 OFFICE O/P EST MOD 30 MIN: CPT | Performed by: PHYSICIAN ASSISTANT

## 2021-02-19 RX ORDER — CYCLOBENZAPRINE HCL 10 MG
5-10 TABLET ORAL 3 TIMES DAILY PRN
Qty: 30 TABLET | Refills: 1 | Status: SHIPPED | OUTPATIENT
Start: 2021-02-19 | End: 2021-04-28 | Stop reason: SDUPTHER
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
## 2021-03-05 ENCOUNTER — LAB (OUTPATIENT)
Dept: LAB | Facility: HOSPITAL | Age: 51
End: 2021-03-05

## 2021-03-05 DIAGNOSIS — D50.0 IRON DEFICIENCY ANEMIA DUE TO CHRONIC BLOOD LOSS: ICD-10-CM

## 2021-03-05 LAB
BASOPHILS # BLD AUTO: 0.06 10*3/MM3 (ref 0–0.2)
BASOPHILS NFR BLD AUTO: 1.3 % (ref 0–1.5)
DEPRECATED RDW RBC AUTO: 42.2 FL (ref 37–54)
EOSINOPHIL # BLD AUTO: 0.03 10*3/MM3 (ref 0–0.4)
EOSINOPHIL NFR BLD AUTO: 0.7 % (ref 0.3–6.2)
ERYTHROCYTE [DISTWIDTH] IN BLOOD BY AUTOMATED COUNT: 13.9 % (ref 12.3–15.4)
FERRITIN SERPL-MCNC: 19.8 NG/ML (ref 13–150)
HCT VFR BLD AUTO: 39.2 % (ref 34–46.6)
HGB BLD-MCNC: 12.3 G/DL (ref 12–15.9)
IMM GRANULOCYTES # BLD AUTO: 0.01 10*3/MM3 (ref 0–0.05)
IMM GRANULOCYTES NFR BLD AUTO: 0.2 % (ref 0–0.5)
IRON 24H UR-MRATE: 125 MCG/DL (ref 37–145)
IRON SATN MFR SERPL: 25 % (ref 20–50)
LYMPHOCYTES # BLD AUTO: 1.91 10*3/MM3 (ref 0.7–3.1)
LYMPHOCYTES NFR BLD AUTO: 42.5 % (ref 19.6–45.3)
MCH RBC QN AUTO: 26.5 PG (ref 26.6–33)
MCHC RBC AUTO-ENTMCNC: 31.4 G/DL (ref 31.5–35.7)
MCV RBC AUTO: 84.3 FL (ref 79–97)
MONOCYTES # BLD AUTO: 0.4 10*3/MM3 (ref 0.1–0.9)
MONOCYTES NFR BLD AUTO: 8.9 % (ref 5–12)
NEUTROPHILS NFR BLD AUTO: 2.08 10*3/MM3 (ref 1.7–7)
NEUTROPHILS NFR BLD AUTO: 46.4 % (ref 42.7–76)
NRBC BLD AUTO-RTO: 0 /100 WBC (ref 0–0.2)
PLATELET # BLD AUTO: 373 10*3/MM3 (ref 140–450)
PMV BLD AUTO: 11.5 FL (ref 6–12)
RBC # BLD AUTO: 4.65 10*6/MM3 (ref 3.77–5.28)
TIBC SERPL-MCNC: 510 MCG/DL (ref 298–536)
TRANSFERRIN SERPL-MCNC: 342 MG/DL (ref 200–360)
WBC # BLD AUTO: 4.49 10*3/MM3 (ref 3.4–10.8)

## 2021-03-05 PROCEDURE — 84466 ASSAY OF TRANSFERRIN: CPT

## 2021-03-05 PROCEDURE — 83540 ASSAY OF IRON: CPT

## 2021-03-05 PROCEDURE — 85025 COMPLETE CBC W/AUTO DIFF WBC: CPT

## 2021-03-05 PROCEDURE — 82728 ASSAY OF FERRITIN: CPT

## 2021-03-16 ENCOUNTER — TRANSCRIBE ORDERS (OUTPATIENT)
Dept: ADMINISTRATIVE | Facility: HOSPITAL | Age: 51
End: 2021-03-16

## 2021-03-16 DIAGNOSIS — M50.120 MID-CERVICAL DISC DISORDER, UNSPECIFIED LEVEL: Primary | ICD-10-CM

## 2021-03-24 ENCOUNTER — HOSPITAL ENCOUNTER (OUTPATIENT)
Dept: MRI IMAGING | Facility: HOSPITAL | Age: 51
Discharge: HOME | End: 2021-03-24
Admitting: CHIROPRACTOR

## 2021-03-24 DIAGNOSIS — M50.120 MID-CERVICAL DISC DISORDER, UNSPECIFIED LEVEL: ICD-10-CM

## 2021-03-24 PROCEDURE — 72141 MRI NECK SPINE W/O DYE: CPT

## 2021-03-25 ENCOUNTER — PATIENT MESSAGE (OUTPATIENT)
Dept: FAMILY MEDICINE CLINIC | Facility: CLINIC | Age: 51
End: 2021-03-25

## 2021-03-25 DIAGNOSIS — M54.2 NECK PAIN: ICD-10-CM

## 2021-03-25 DIAGNOSIS — M50.90 CERVICAL DISC DISEASE: Primary | ICD-10-CM
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
## 2021-04-28 ENCOUNTER — OFFICE VISIT (OUTPATIENT)
Dept: NEUROSURGERY | Facility: CLINIC | Age: 51
End: 2021-04-28

## 2021-04-28 VITALS
TEMPERATURE: 96.9 F | WEIGHT: 197.2 LBS | HEIGHT: 63 IN | DIASTOLIC BLOOD PRESSURE: 70 MMHG | SYSTOLIC BLOOD PRESSURE: 120 MMHG | BODY MASS INDEX: 34.94 KG/M2

## 2021-04-28 DIAGNOSIS — M50.30 DDD (DEGENERATIVE DISC DISEASE), CERVICAL: Primary | ICD-10-CM

## 2021-04-28 PROCEDURE — 99204 OFFICE O/P NEW MOD 45 MIN: CPT | Performed by: PHYSICIAN ASSISTANT

## 2021-04-28 RX ORDER — CYCLOBENZAPRINE HCL 10 MG
5-10 TABLET ORAL 3 TIMES DAILY PRN
Qty: 60 TABLET | Refills: 1 | Status: SHIPPED | OUTPATIENT
Start: 2021-04-28 | End: 2023-03-30
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
## 2021-05-05 ENCOUNTER — HOSPITAL ENCOUNTER (OUTPATIENT)
Dept: PHYSICAL THERAPY | Facility: HOSPITAL | Age: 51
Setting detail: THERAPIES SERIES
Discharge: HOME | End: 2021-05-05

## 2021-05-05 DIAGNOSIS — S13.4XXA WHIPLASH INJURY TO NECK, INITIAL ENCOUNTER: ICD-10-CM

## 2021-05-05 DIAGNOSIS — G44.319 ACUTE POST-TRAUMATIC HEADACHE, NOT INTRACTABLE: ICD-10-CM

## 2021-05-05 DIAGNOSIS — M54.2 NECK PAIN: Primary | ICD-10-CM

## 2021-05-05 PROCEDURE — 97161 PT EVAL LOW COMPLEX 20 MIN: CPT
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
## 2021-05-10 ENCOUNTER — HOSPITAL ENCOUNTER (OUTPATIENT)
Dept: PHYSICAL THERAPY | Facility: HOSPITAL | Age: 51
Setting detail: THERAPIES SERIES
Discharge: HOME | End: 2021-05-10

## 2021-05-10 DIAGNOSIS — M54.2 NECK PAIN: Primary | ICD-10-CM

## 2021-05-10 DIAGNOSIS — S13.4XXA WHIPLASH INJURY TO NECK, INITIAL ENCOUNTER: ICD-10-CM

## 2021-05-10 PROCEDURE — 97140 MANUAL THERAPY 1/> REGIONS: CPT

## 2021-05-10 PROCEDURE — 97110 THERAPEUTIC EXERCISES: CPT
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
## 2021-05-13 ENCOUNTER — HOSPITAL ENCOUNTER (OUTPATIENT)
Dept: PHYSICAL THERAPY | Facility: HOSPITAL | Age: 51
Setting detail: THERAPIES SERIES
Discharge: HOME | End: 2021-05-13

## 2021-05-13 DIAGNOSIS — S13.4XXA WHIPLASH INJURY TO NECK, INITIAL ENCOUNTER: ICD-10-CM

## 2021-05-13 DIAGNOSIS — M54.2 NECK PAIN: Primary | ICD-10-CM

## 2021-05-13 PROCEDURE — 97012 MECHANICAL TRACTION THERAPY: CPT

## 2021-05-13 PROCEDURE — 97140 MANUAL THERAPY 1/> REGIONS: CPT
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
## 2021-05-17 ENCOUNTER — HOSPITAL ENCOUNTER (OUTPATIENT)
Dept: PHYSICAL THERAPY | Facility: HOSPITAL | Age: 51
Setting detail: THERAPIES SERIES
Discharge: HOME | End: 2021-05-17

## 2021-05-17 DIAGNOSIS — S13.4XXA WHIPLASH INJURY TO NECK, INITIAL ENCOUNTER: ICD-10-CM

## 2021-05-17 DIAGNOSIS — M54.2 NECK PAIN: Primary | ICD-10-CM

## 2021-05-17 PROCEDURE — 97012 MECHANICAL TRACTION THERAPY: CPT

## 2021-05-17 PROCEDURE — 97140 MANUAL THERAPY 1/> REGIONS: CPT
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
## 2021-05-18 ENCOUNTER — TELEPHONE (OUTPATIENT)
Dept: NEUROSURGERY | Facility: CLINIC | Age: 51
End: 2021-05-18

## 2021-05-18 NOTE — TELEPHONE ENCOUNTER
Caller: Laure Valentin    Relationship to patient: Self    Best call back number: [phone]    Chief complaint: PT CALLED ABOUT HER 6/9/21 APPT   SHE WOULD LIKE TO SEE THE MD SHE STATED SHE SAW THE PA-C THE LAST APPT AND WAS TOLD SHE WOULD BE ABLE TO SEE THE MD ON THE FOLLOWING APPT.  PLEASE CALL PT TO ADVISE,   SHE ALSO STATED SHE IS OPEN TO AN AFTERNOON APPT     Type of visit: FOLLOW UP       If rescheduling, when is the original appointment: 6/9/21

## 2021-05-19 NOTE — TELEPHONE ENCOUNTER
Spoke to patient and relayed PA's message. Patient plans to keep her appointment with Dr. Lane. Patient would like to keep appointment with Dr. Herrera on the 9th due to work.

## 2021-05-19 NOTE — TELEPHONE ENCOUNTER
Provider:  Javier  Surgery:  KACIE  Surgery Date:    Last visit:   04/28/21  Next visit: 06/09/21 (Kaya Soria PA-C)    Reason for call:     Pt requesting to f/u with MD instead of PA.

## 2021-05-20 ENCOUNTER — HOSPITAL ENCOUNTER (OUTPATIENT)
Dept: PHYSICAL THERAPY | Facility: HOSPITAL | Age: 51
Setting detail: THERAPIES SERIES
Discharge: HOME | End: 2021-05-20

## 2021-05-20 DIAGNOSIS — S13.4XXA WHIPLASH INJURY TO NECK, INITIAL ENCOUNTER: ICD-10-CM

## 2021-05-20 DIAGNOSIS — M54.2 NECK PAIN: Primary | ICD-10-CM

## 2021-05-20 PROCEDURE — 97012 MECHANICAL TRACTION THERAPY: CPT

## 2021-05-20 PROCEDURE — 97110 THERAPEUTIC EXERCISES: CPT

## 2021-05-20 PROCEDURE — 97140 MANUAL THERAPY 1/> REGIONS: CPT
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
## 2021-05-26 ENCOUNTER — HOSPITAL ENCOUNTER (OUTPATIENT)
Dept: PHYSICAL THERAPY | Facility: HOSPITAL | Age: 51
Setting detail: THERAPIES SERIES
Discharge: HOME | End: 2021-05-26

## 2021-05-26 DIAGNOSIS — M54.2 NECK PAIN: Primary | ICD-10-CM

## 2021-05-26 PROCEDURE — 97140 MANUAL THERAPY 1/> REGIONS: CPT

## 2021-05-26 PROCEDURE — 97110 THERAPEUTIC EXERCISES: CPT

## 2021-05-26 NOTE — THERAPY TREATMENT NOTE
Outpatient Physical Therapy Ortho Treatment Note   [facility]     Patient Name: Laure Valentin  : 1970  MRN: [id number]  Today's Date: 2021      Visit Date: 2021    Visit Dx:    ICD-10-CM ICD-9-CM   1. Neck pain  M54.2 723.1       Patient Active Problem List   Diagnosis   • Iron deficiency anemia due to chronic blood loss   • Annual GYN exam w/o problems   • Knee pain, bilateral   • Mirena   • Class 1 obesity due to excess calories with body mass index (BMI) of 34.0 to 34.9 in adult        Past Medical History:   Diagnosis Date   • GERD (gastroesophageal reflux disease)     resolved w/ WLS   • History of kidney stones     most recent 17   • History of transfusion of 4 units of packed RBC 2019   • Intramural uterine fibroid    • Iron deficiency anemia 10/4/2017    -Has had colonoscopy.  -Gastric sleeve 2018 but patient states anemia predates her bariatric surgery -Requiring iron infusions last year.  -8/10/2018 h/h , iron 20.  -Ferrous sulfate 325 mg TID, repeat labs 1 month.    • Microcytic anemia         Past Surgical History:   Procedure Laterality Date   • CERVICAL CERCLAGE     • COLONOSCOPY     • D & C WITH SUCTION     • GASTRIC SLEEVE LAPAROSCOPIC N/A 2017    Procedure: GASTRIC SLEEVE LAPAROSCOPY   • KNEE MENISCECTOMY Right 2018   • LAPAROSCOPIC TUBAL LIGATION     • [place] LAP,ESOPHAGOGAST FUNDOPLASTY N/A 2017    Procedure: HIATAL HERNIA REPAIR LAPAROSCOPIC;  Surgeon: Eulalio Dick MD   • WISDOM TOOTH EXTRACTION                         PT Assessment/Plan     Row Name 21 3243          PT Assessment    Assessment Comments  No complaints with exercises. frequency of symptoms is improving.   -MM        PT Plan    PT Plan Comments  Continue per plan of treatment.  -MM       User Key  (r) = Recorded By, (t) = Taken By, (c) = Cosigned By    Initials Name Provider Type    Ramón Alegria, PT Physical Therapist           Modalities     Row Name 05/26/21 0730             Traction 01208    Traction Type  --  -MM      PT Traction Rx Minutes  --  -MM      Duration  --  -MM      Position  --  -MM      Weight  --  -MM      Hold  --  -MM      Relax  --  -MM      Progression  --  -MM      Regression  --  -MM        User Key  (r) = Recorded By, (t) = Taken By, (c) = Cosigned By    Initials Name Provider Type    Ramón Alegria, PT Physical Therapist        OP Exercises     Row Name 05/26/21 0730             Subjective Comments    Subjective Comments  Client reports some pain on the right levator scap region yesterday while doing computer work. She also still notices some tingling on the left side at times throughout the day. Neck pain overall has been better.  -MM         Subjective Pain    Able to rate subjective pain?  yes  -MM      Pre-Treatment Pain Level  0  -MM      Post-Treatment Pain Level  0  -MM         Total Minutes    76088 - PT Therapeutic Exercise Minutes  15  -MM      35358 - PT Manual Therapy Minutes  15  -MM         Exercise 1    Exercise Name 1  CC rows, pulldowns  -MM      Reps 1  15/15  -MM      Time 1  --  -MM      Additional Comments  --  -MM         Exercise 2    Exercise Name 2  band pull aparts  -MM      Reps 2  15  -MM      Additional Comments  red  -MM         Exercise 3    Exercise Name 3  thoracic stx on foam  -MM      Time 3  2 min  -MM         Exercise 4    Exercise Name 4  neck stretch: rotation, lateral flexion, extension  -MM      Reps 4  10/10/10  -MM        User Key  (r) = Recorded By, (t) = Taken By, (c) = Cosigned By    Initials Name Provider Type    Ramón Alegria, PT Physical Therapist                      Manual Rx (last 36 hours)      Manual Treatments     Row Name 05/26/21 0730             Total Minutes    26481 - PT Manual Therapy Minutes  15  -MM         Manual Rx 1    Manual Rx 1 Location  neck/upper trap  -MM      Manual Rx 1 Type  Included manual traction and mobilization w  movement for rotation and lateral flexion. Also included soft tissue mobilization using ASTYM technique. Moderate pressure was used w client prone.  -MM      Manual Rx 1 Duration  15  -MM        User Key  (r) = Recorded By, (t) = Taken By, (c) = Cosigned By    Initials Name Provider Type    Ramón Alegria, PT Physical Therapist                             Time Calculation:   Start Time: 0730  Timed Charges  31704 - PT Therapeutic Exercise Minutes: 15  78828 - PT Manual Therapy Minutes: 15  Total Minutes  Timed Charges Total Minutes: 30   Total Minutes: 30  Therapy Charges for Today     Code Description Service Date Service Provider Modifiers Qty    58848405136  PT THER PROC EA 15 MIN 5/26/2021 Ramón Turpin, PT GP 1    [id number]  PT MANUAL THERAPY EA 15 MIN 5/26/2021 Ramón Turpin, PT GP 1                    Ramón Turpin, PT  5/26/2021

## 2021-05-30 PROBLEM — R51.9 OCCIPITAL HEADACHE: Status: ACTIVE | Noted: 2021-05-30

## 2021-05-30 PROBLEM — S13.4XXA WHIPLASH INJURY TO NECK: Status: ACTIVE | Noted: 2021-05-30

## 2021-05-30 PROBLEM — M50.30 CERVICAL DISCOGENIC PAIN SYNDROME: Status: ACTIVE | Noted: 2021-05-30
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
## 2021-05-30 PROBLEM — M50.20 CERVICAL DISCOGENIC PAIN SYNDROME: Status: ACTIVE | Noted: 2021-05-30

## 2021-05-30 PROBLEM — M47.812 CERVICAL SPONDYLOSIS WITHOUT MYELOPATHY: Status: ACTIVE | Noted: 2021-05-30

## 2021-05-30 PROBLEM — M50.30 DDD (DEGENERATIVE DISC DISEASE), CERVICAL: Status: ACTIVE | Noted: 2021-05-30

## 2021-06-01 ENCOUNTER — OFFICE VISIT (OUTPATIENT)
Dept: PAIN MEDICINE | Facility: CLINIC | Age: 51
End: 2021-06-01

## 2021-06-01 VITALS
OXYGEN SATURATION: 99 % | BODY MASS INDEX: 35.88 KG/M2 | TEMPERATURE: 96.4 F | SYSTOLIC BLOOD PRESSURE: 115 MMHG | HEIGHT: 62 IN | DIASTOLIC BLOOD PRESSURE: 81 MMHG | WEIGHT: 195 LBS | HEART RATE: 79 BPM

## 2021-06-01 DIAGNOSIS — R51.9 OCCIPITAL HEADACHE: ICD-10-CM

## 2021-06-01 DIAGNOSIS — M50.20 CERVICAL DISCOGENIC PAIN SYNDROME: Primary | ICD-10-CM

## 2021-06-01 DIAGNOSIS — M47.812 CERVICAL SPONDYLOSIS WITHOUT MYELOPATHY: ICD-10-CM

## 2021-06-01 DIAGNOSIS — M50.30 DDD (DEGENERATIVE DISC DISEASE), CERVICAL: ICD-10-CM

## 2021-06-01 DIAGNOSIS — S13.4XXS WHIPLASH INJURY TO NECK, SEQUELA: ICD-10-CM
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
## 2021-06-01 DIAGNOSIS — M50.20 CERVICAL DISCOGENIC PAIN SYNDROME: ICD-10-CM

## 2021-06-01 PROCEDURE — 99204 OFFICE O/P NEW MOD 45 MIN: CPT | Performed by: ANESTHESIOLOGY

## 2021-06-01 RX ORDER — ME-TETRAHYDROFOLATE/B12/HRB236 1-1-500 MG
1 CAPSULE ORAL DAILY
Qty: 90 CAPSULE | Refills: 1 | Status: SHIPPED | OUTPATIENT
Start: 2021-06-01 | End: 2022-01-04 | Stop reason: SDUPTHER

## 2021-06-01 RX ORDER — DULOXETIN HYDROCHLORIDE 20 MG/1
CAPSULE, DELAYED RELEASE ORAL
Qty: 69 CAPSULE | Refills: 0 | Status: SHIPPED | OUTPATIENT
Start: 2021-06-01 | End: 2021-07-13 | Stop reason: SDUPTHER

## 2021-06-01 RX ORDER — MULTIVITAMIN WITH IRON
100 TABLET ORAL DAILY
Qty: 30 TABLET | Refills: 0 | Status: SHIPPED | OUTPATIENT
Start: 2021-06-01 | End: 2022-09-13

## 2021-06-02 ENCOUNTER — HOSPITAL ENCOUNTER (OUTPATIENT)
Dept: PHYSICAL THERAPY | Facility: HOSPITAL | Age: 51
Setting detail: THERAPIES SERIES
Discharge: HOME | End: 2021-06-02

## 2021-06-02 DIAGNOSIS — M54.2 NECK PAIN: Primary | ICD-10-CM

## 2021-06-02 PROCEDURE — 97140 MANUAL THERAPY 1/> REGIONS: CPT

## 2021-06-02 PROCEDURE — 97110 THERAPEUTIC EXERCISES: CPT
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
## 2021-06-09 ENCOUNTER — HOSPITAL ENCOUNTER (OUTPATIENT)
Dept: PHYSICAL THERAPY | Facility: HOSPITAL | Age: 51
Setting detail: THERAPIES SERIES
Discharge: HOME | End: 2021-06-09

## 2021-06-09 DIAGNOSIS — M54.2 NECK PAIN: Primary | ICD-10-CM

## 2021-06-09 DIAGNOSIS — G44.319 ACUTE POST-TRAUMATIC HEADACHE, NOT INTRACTABLE: ICD-10-CM

## 2021-06-09 PROCEDURE — 97140 MANUAL THERAPY 1/> REGIONS: CPT

## 2021-06-09 PROCEDURE — 97110 THERAPEUTIC EXERCISES: CPT
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
## 2021-06-14 DIAGNOSIS — M50.20 CERVICAL DISCOGENIC PAIN SYNDROME: Primary | ICD-10-CM

## 2021-06-16 ENCOUNTER — HOSPITAL ENCOUNTER (OUTPATIENT)
Dept: PHYSICAL THERAPY | Facility: HOSPITAL | Age: 51
Setting detail: THERAPIES SERIES
Discharge: HOME | End: 2021-06-16

## 2021-06-16 DIAGNOSIS — G44.319 ACUTE POST-TRAUMATIC HEADACHE, NOT INTRACTABLE: ICD-10-CM

## 2021-06-16 DIAGNOSIS — S13.4XXA WHIPLASH INJURY TO NECK, INITIAL ENCOUNTER: ICD-10-CM

## 2021-06-16 DIAGNOSIS — M54.2 NECK PAIN: Primary | ICD-10-CM

## 2021-06-16 PROCEDURE — 97110 THERAPEUTIC EXERCISES: CPT

## 2021-06-16 PROCEDURE — 97140 MANUAL THERAPY 1/> REGIONS: CPT
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
## 2021-07-13 DIAGNOSIS — M50.20 CERVICAL DISCOGENIC PAIN SYNDROME: ICD-10-CM

## 2021-07-13 RX ORDER — DULOXETIN HYDROCHLORIDE 20 MG/1
CAPSULE, DELAYED RELEASE ORAL
Qty: 69 CAPSULE | Refills: 0 | Status: SHIPPED | OUTPATIENT
Start: 2021-07-13 | End: 2021-07-13 | Stop reason: SDUPTHER

## 2021-07-13 RX ORDER — DULOXETIN HYDROCHLORIDE 60 MG/1
60 CAPSULE, DELAYED RELEASE ORAL DAILY
Qty: 30 CAPSULE | Refills: 0 | Status: SHIPPED | OUTPATIENT
Start: 2021-07-13 | End: 2021-08-30 | Stop reason: SDUPTHER

## 2021-08-30 DIAGNOSIS — M50.20 CERVICAL DISCOGENIC PAIN SYNDROME: ICD-10-CM

## 2021-08-30 RX ORDER — DULOXETIN HYDROCHLORIDE 60 MG/1
60 CAPSULE, DELAYED RELEASE ORAL DAILY
Qty: 30 CAPSULE | Refills: 0 | Status: SHIPPED | OUTPATIENT
Start: 2021-08-30 | End: 2022-01-04 | Stop reason: SDUPTHER

## 2021-09-01 ENCOUNTER — OFFICE VISIT (OUTPATIENT)
Dept: FAMILY MEDICINE CLINIC | Facility: CLINIC | Age: 51
End: 2021-09-01

## 2021-09-01 VITALS
HEIGHT: 62 IN | SYSTOLIC BLOOD PRESSURE: 122 MMHG | WEIGHT: 189 LBS | BODY MASS INDEX: 34.78 KG/M2 | DIASTOLIC BLOOD PRESSURE: 86 MMHG | OXYGEN SATURATION: 98 % | HEART RATE: 76 BPM

## 2021-09-01 DIAGNOSIS — M50.20 CERVICAL DISCOGENIC PAIN SYNDROME: ICD-10-CM

## 2021-09-01 DIAGNOSIS — Z00.00 PREVENTATIVE HEALTH CARE: Primary | ICD-10-CM

## 2021-09-01 DIAGNOSIS — D50.0 IRON DEFICIENCY ANEMIA DUE TO CHRONIC BLOOD LOSS: ICD-10-CM

## 2021-09-01 DIAGNOSIS — M50.30 DDD (DEGENERATIVE DISC DISEASE), CERVICAL: ICD-10-CM

## 2021-09-01 DIAGNOSIS — E66.09 CLASS 1 OBESITY DUE TO EXCESS CALORIES WITH BODY MASS INDEX (BMI) OF 34.0 TO 34.9 IN ADULT, UNSPECIFIED WHETHER SERIOUS COMORBIDITY PRESENT: ICD-10-CM

## 2021-09-01 PROCEDURE — 99396 PREV VISIT EST AGE 40-64: CPT | Performed by: PHYSICIAN ASSISTANT
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
## 2021-09-23 ENCOUNTER — TELEMEDICINE (OUTPATIENT)
Dept: FAMILY MEDICINE CLINIC | Facility: TELEHEALTH | Age: 51
End: 2021-09-23

## 2021-09-23 VITALS — HEIGHT: 62 IN | BODY MASS INDEX: 34.78 KG/M2 | WEIGHT: 189 LBS

## 2021-09-23 DIAGNOSIS — H10.9 CONJUNCTIVITIS OF BOTH EYES, UNSPECIFIED CONJUNCTIVITIS TYPE: Primary | ICD-10-CM

## 2021-09-23 PROCEDURE — 99213 OFFICE O/P EST LOW 20 MIN: CPT | Performed by: NURSE PRACTITIONER

## 2021-09-23 RX ORDER — PREDNISOLONE ACETATE 10 MG/ML
2 SUSPENSION/ DROPS OPHTHALMIC 4 TIMES DAILY
Qty: 5 ML | Refills: 0 | Status: SHIPPED | OUTPATIENT
Start: 2021-09-23 | End: 2021-10-06

## 2021-09-23 RX ORDER — POLYMYXIN B SULFATE AND TRIMETHOPRIM 1; 10000 MG/ML; [USP'U]/ML
1 SOLUTION OPHTHALMIC EVERY 4 HOURS
Qty: 10 ML | Refills: 0 | Status: SHIPPED | OUTPATIENT
Start: 2021-09-23 | End: 2021-10-09

## 2021-09-23 RX ORDER — PHENTERMINE HYDROCHLORIDE 37.5 MG/1
37.5 TABLET ORAL DAILY
COMMUNITY
Start: 2021-09-21 | End: 2023-03-29

## 2021-09-23 NOTE — PATIENT INSTRUCTIONS
Bacterial Conjunctivitis, Adult  Bacterial conjunctivitis is an infection of your conjunctiva. This is the clear membrane that covers the white part of your eye and the inner part of your eyelid. This infection can make your eye:  · Red or pink.  · Itchy.  This condition spreads easily from person to person (is contagious) and from one eye to the other eye.  What are the causes?  · This condition is caused by germs (bacteria). You may get the infection if you come into close contact with:  ? A person who has the infection.  ? Items that have germs on them (are contaminated), such as face towels, contact lens solution, or eye makeup.  What increases the risk?  You are more likely to get this condition if you:  · Have contact with people who have the infection.  · Wear contact lenses.  · Have a sinus infection.  · Have had a recent eye injury or surgery.  · Have a weak body defense system (immune system).  · Have dry eyes.  What are the signs or symptoms?    · Thick, yellowish discharge from the eye.  · Tearing or watery eyes.  · Itchy eyes.  · Burning feeling in your eyes.  · Eye redness.  · Swollen eyelids.  · Blurred vision.  How is this treated?    · Antibiotic eye drops or ointment.  · Antibiotic medicine taken by mouth. This is used for infections that do not get better with drops or ointment or that last more than 10 days.  · Cool, wet cloths placed on the eyes.  · Artificial tears used 2-6 times a day.  Follow these instructions at home:  Medicines  · Take or apply your antibiotic medicine as told by your doctor. Do not stop taking or applying the antibiotic even if you start to feel better.  · Take or apply over-the-counter and prescription medicines only as told by your doctor.  · Do not touch your eyelid with the eye-drop bottle or the ointment tube.  Managing discomfort  · Wipe any fluid from your eye with a warm, wet washcloth or a cotton ball.  · Place a clean, cool, wet cloth on your eye. Do this for  10-20 minutes, 3-4 times per day.  General instructions  · Do not wear contacts until the infection is gone. Wear glasses until your doctor says it is okay to wear contacts again.  · Do not wear eye makeup until the infection is gone. Throw away old eye makeup.  · Change or wash your pillowcase every day.  · Do not share towels or washcloths.  · Wash your hands often with soap and water. Use paper towels to dry your hands.  · Do not touch or rub your eyes.  · Do not drive or use heavy machinery if your vision is blurred.  Contact a doctor if:  · You have a fever.  · You do not get better after 10 days.  Get help right away if:  · You have a fever and your symptoms get worse all of a sudden.  · You have very bad pain when you move your eye.  · Your face:  ? Hurts.  ? Is red.  ? Is swollen.  · You have sudden loss of vision.  Summary  · Bacterial conjunctivitis is an infection of your conjunctiva.  · This infection spreads easily from person to person.  · Wash your hands often with soap and water. Use paper towels to dry your hands.  · Take or apply your antibiotic medicine as told by your doctor.  · Contact a doctor if you have a fever or you do not get better after 10 days.  This information is not intended to replace advice given to you by your health care provider. Make sure you discuss any questions you have with your health care provider.  Document Revised: 04/07/2020 Document Reviewed: 07/24/2019  [employer] Patient Education © 2021 [employer]

## 2021-09-23 NOTE — PROGRESS NOTES
CHIEF COMPLAINT  Cc: eye redness    HPI  Laure Valentin is a 50 y.o. female  presents with complaint of eye redness. It is is in both of her eyes. She reports that she left her contacts in for two long. In addition she reports this has had this happened before and that her eye doctor gave her antibiotic and steroid eye drops.for it which was effective. She reports that both eyes are red and watery and feels like there is something in then. She has taken her contacts out.    Review of Systems   Constitutional: Negative for fatigue and fever.   HENT: Negative for congestion and sore throat.    Eyes: Positive for pain (feels like something in it), discharge (watery) and redness (bilateral). Negative for itching. Photophobia: mild.   Respiratory: Negative for cough, shortness of breath and wheezing.    Cardiovascular: Negative for chest pain.   Gastrointestinal: Negative for diarrhea, nausea and vomiting.   Neurological: Negative for headaches.       Past Medical History:   Diagnosis Date   • GERD (gastroesophageal reflux disease)     resolved w/ WLS   • History of kidney stones 2011    most recent 12/24/17   • History of transfusion of 4 units of packed RBC 05/2019   • Intramural uterine fibroid 2018   • Iron deficiency anemia 10/4/2017    -Has had colonoscopy.  -Gastric sleeve 1/2018 but patient states anemia predates her bariatric surgery -Requiring iron infusions last year.  -8/10/2018 h/h 9/29, iron 20.  -Ferrous sulfate 325 mg TID, repeat labs 1 month.    • Microcytic anemia        Family History   Problem Relation Age of Onset   • Hypertension Mother    • Hyperlipidemia Mother    • Osteoarthritis Mother    • No Known Problems Father    • Breast cancer Neg Hx    • Ovarian cancer Neg Hx        Social History     Socioeconomic History   • Marital status:      Spouse name: Not on file   • Number of children: Not on file   • Years of education: Not on file   • Highest education level: Not on file   Tobacco  "Use   • Smoking status: Never Smoker   • Smokeless tobacco: Never Used   Vaping Use   • Vaping Use: Never used   Substance and Sexual Activity   • Alcohol use: No   • Drug use: No   • Sexual activity: Defer         Ht 157.5 cm (62\")   Wt 85.7 kg (189 lb)   BMI 34.57 kg/m²     PHYSICAL EXAM  Physical Exam   Constitutional: She is oriented to person, place, and time. She appears well-developed and well-nourished.   HENT:   Head: Normocephalic and atraumatic.   Right Ear: Hearing and external ear normal.   Left Ear: Hearing and external ear normal.   Nose: Nose normal.   Mouth/Throat: Mouth/Lips are normal.  Eyes: Lids are normal. Right eye exhibits discharge. Left eye exhibits discharge. Right conjunctiva is injected. Left conjunctiva is injected.   Pulmonary/Chest: No accessory muscle usage. No tachypnea and no bradypnea.  No respiratory distress.No use of oxygen by nasal cannulaNo use of oxygen by mask noted.  Abdominal: Abdomen appears normal.   Neurological: She is alert and oriented to person, place, and time. No cranial nerve deficit.   Skin: Her skin appears normal.  Psychiatric: She has a normal mood and affect. Her speech is normal and behavior is normal. Judgment and thought content normal.       Results for orders placed or performed in visit on 03/05/21   Ferritin    Specimen: Blood   Result Value Ref Range    Ferritin 19.80 13.00 - 150.00 ng/mL   Iron Profile    Specimen: Blood   Result Value Ref Range    Iron 125 37 - 145 mcg/dL    Iron Saturation 25 20 - 50 %    Transferrin 342 200 - 360 mg/dL    TIBC 510 298 - 536 mcg/dL   CBC Auto Differential    Specimen: Blood   Result Value Ref Range    WBC 4.49 3.40 - 10.80 10*3/mm3    RBC 4.65 3.77 - 5.28 10*6/mm3    Hemoglobin 12.3 12.0 - 15.9 g/dL    Hematocrit 39.2 34.0 - 46.6 %    MCV 84.3 79.0 - 97.0 fL    MCH 26.5 (L) 26.6 - 33.0 pg    MCHC 31.4 (L) 31.5 - 35.7 g/dL    RDW 13.9 12.3 - 15.4 %    RDW-SD 42.2 37.0 - 54.0 fl    MPV 11.5 6.0 - 12.0 fL    " Platelets 373 140 - 450 10*3/mm3    Neutrophil % 46.4 42.7 - 76.0 %    Lymphocyte % 42.5 19.6 - 45.3 %    Monocyte % 8.9 5.0 - 12.0 %    Eosinophil % 0.7 0.3 - 6.2 %    Basophil % 1.3 0.0 - 1.5 %    Immature Grans % 0.2 0.0 - 0.5 %    Neutrophils, Absolute 2.08 1.70 - 7.00 10*3/mm3    Lymphocytes, Absolute 1.91 0.70 - 3.10 10*3/mm3    Monocytes, Absolute 0.40 0.10 - 0.90 10*3/mm3    Eosinophils, Absolute 0.03 0.00 - 0.40 10*3/mm3    Basophils, Absolute 0.06 0.00 - 0.20 10*3/mm3    Immature Grans, Absolute 0.01 0.00 - 0.05 10*3/mm3    nRBC 0.0 0.0 - 0.2 /100 WBC       Diagnoses and all orders for this visit:    1. Conjunctivitis of both eyes, unspecified conjunctivitis type (Primary)    Other orders  -     prednisoLONE acetate (PRED FORTE) 1 % ophthalmic suspension; Administer 2 drops to both eyes 4 (Four) Times a Day for 3 days.  Dispense: 5 mL; Refill: 0  -     trimethoprim-polymyxin b (Polytrim) 06831-2.1 UNIT/ML-% ophthalmic solution; Administer 1 drop to both eyes Every 4 (Four) Hours for 5 days.  Dispense: 10 mL; Refill: 0    Use clean cloth to wipe  Leave contact out until infection is gone    FOLLOW UP  If symptoms worsen or persist follow up with ophthalmologist if symptoms persist    Patient verbalizes understanding of medication dosage, comfort measures, instructions for treatment and follow-up.    Tiaramarlen Levi, APRN  09/23/2021  13:53 EDT    This visit was performed via Telehealth.  This patient has been instructed to follow-up with their primary care provider if their symptoms worsen or the treatment provided does not resolve their illness.

## 2021-10-04 ENCOUNTER — OFFICE VISIT (OUTPATIENT)
Dept: PAIN MEDICINE | Facility: CLINIC | Age: 51
End: 2021-10-04

## 2021-10-04 ENCOUNTER — TELEPHONE (OUTPATIENT)
Dept: PAIN MEDICINE | Facility: CLINIC | Age: 51
End: 2021-10-04

## 2021-10-04 VITALS
HEART RATE: 77 BPM | HEIGHT: 62 IN | OXYGEN SATURATION: 100 % | SYSTOLIC BLOOD PRESSURE: 114 MMHG | DIASTOLIC BLOOD PRESSURE: 72 MMHG | BODY MASS INDEX: 34.6 KG/M2 | TEMPERATURE: 96 F | WEIGHT: 188 LBS

## 2021-10-04 DIAGNOSIS — M50.30 DDD (DEGENERATIVE DISC DISEASE), CERVICAL: ICD-10-CM

## 2021-10-04 DIAGNOSIS — S13.4XXS WHIPLASH INJURY TO NECK, SEQUELA: ICD-10-CM

## 2021-10-04 DIAGNOSIS — R51.9 OCCIPITAL HEADACHE: ICD-10-CM

## 2021-10-04 DIAGNOSIS — M47.812 CERVICAL SPONDYLOSIS WITHOUT MYELOPATHY: ICD-10-CM

## 2021-10-04 DIAGNOSIS — M50.20 CERVICAL DISCOGENIC PAIN SYNDROME: ICD-10-CM
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
## 2021-10-04 PROCEDURE — 99213 OFFICE O/P EST LOW 20 MIN: CPT | Performed by: NURSE PRACTITIONER
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
## 2021-10-04 NOTE — TELEPHONE ENCOUNTER
Caller:  RYAN DELGADO    Relationship to patient: SELF    Best call back number:    Patient is needing: UNABLE TO WT //  PLEASE CALL BACK PATIENT - SHE RETURNED OFFICE CALL TO R/S TO EARLIER TODAY.

## 2021-10-11 ENCOUNTER — IMMUNIZATION (OUTPATIENT)
Dept: VACCINE CLINIC | Facility: HOSPITAL | Age: 51
End: 2021-10-11

## 2021-10-11 PROCEDURE — 0003A: CPT | Performed by: INTERNAL MEDICINE

## 2021-10-11 PROCEDURE — 0004A ADM SARSCOV2 30MCG/0.3ML BOOSTER: CPT | Performed by: INTERNAL MEDICINE

## 2021-10-11 PROCEDURE — 91300 HC SARSCOV02 VAC 30MCG/0.3ML IM: CPT | Performed by: INTERNAL MEDICINE

## 2022-01-04 DIAGNOSIS — M50.20 CERVICAL DISCOGENIC PAIN SYNDROME: ICD-10-CM

## 2022-01-04 RX ORDER — ME-TETRAHYDROFOLATE/B12/HRB236 1-1-500 MG
1 CAPSULE ORAL DAILY
Qty: 90 CAPSULE | Refills: 1 | Status: SHIPPED | OUTPATIENT
Start: 2022-01-04 | End: 2022-04-05 | Stop reason: SDUPTHER

## 2022-01-04 RX ORDER — DULOXETIN HYDROCHLORIDE 60 MG/1
60 CAPSULE, DELAYED RELEASE ORAL DAILY
Qty: 30 CAPSULE | Refills: 0 | Status: SHIPPED | OUTPATIENT
Start: 2022-01-04 | End: 2022-04-05 | Stop reason: SDUPTHER

## 2022-01-30 ENCOUNTER — TELEMEDICINE (OUTPATIENT)
Dept: FAMILY MEDICINE CLINIC | Facility: TELEHEALTH | Age: 52
End: 2022-01-30

## 2022-01-30 DIAGNOSIS — H10.32 ACUTE CONJUNCTIVITIS OF LEFT EYE, UNSPECIFIED ACUTE CONJUNCTIVITIS TYPE: Primary | ICD-10-CM

## 2022-01-30 PROCEDURE — 99213 OFFICE O/P EST LOW 20 MIN: CPT | Performed by: NURSE PRACTITIONER

## 2022-01-30 NOTE — PROGRESS NOTES
You have chosen to receive care through a telehealth visit.  Do you consent to use a video/audio connection for your medical care today? Yes     CHIEF COMPLAINT  Chief Complaint   Patient presents with   • Eye Problem         HPI  Laure Valentin is a 51 y.o. female  presents with complaint of sleeping in her contacts and woke up this morning with left red, tearing and painful eye. She did this in September and she was given two eye drops. One was as steroid and another antibiotics. She states her eye was better in 2-3 days.     Review of Systems   Constitutional: Negative for chills, diaphoresis, fatigue and fever.   HENT: Negative for congestion, rhinorrhea and sneezing.    Eyes: Positive for photophobia (tearing ), pain and redness. Negative for discharge, itching and visual disturbance.       Past Medical History:   Diagnosis Date   • GERD (gastroesophageal reflux disease)     resolved w/ WLS   • History of kidney stones 2011    most recent 12/24/17   • History of transfusion of 4 units of packed RBC 05/2019   • Intramural uterine fibroid 2018   • Iron deficiency anemia 10/4/2017    -Has had colonoscopy.  -Gastric sleeve 1/2018 but patient states anemia predates her bariatric surgery -Requiring iron infusions last year.  -8/10/2018 h/h 9/29, iron 20.  -Ferrous sulfate 325 mg TID, repeat labs 1 month.    • Microcytic anemia        Family History   Problem Relation Age of Onset   • Hypertension Mother    • Hyperlipidemia Mother    • Osteoarthritis Mother    • No Known Problems Father    • Breast cancer Neg Hx    • Ovarian cancer Neg Hx        Social History     Socioeconomic History   • Marital status:    Tobacco Use   • Smoking status: Never Smoker   • Smokeless tobacco: Never Used   Vaping Use   • Vaping Use: Never used   Substance and Sexual Activity   • Alcohol use: No   • Drug use: No   • Sexual activity: Defer         There were no vitals taken for this visit.    PHYSICAL EXAM  Physical Exam    Constitutional: She is oriented to person, place, and time. She appears well-developed and well-nourished. She does not have a sickly appearance. She does not appear ill. No distress.   HENT:   Head: Normocephalic and atraumatic.   Eyes: Pupils are equal, round, and reactive to light. EOM are normal. Right eye exhibits no discharge, no exudate, no hordeolum and no edema. No foreign body present in the right eye. Left eye exhibits no discharge, no exudate, no hordeolum and no edema. No foreign body present in the left eye. Eyelid: no right ptosis or left ptosis. Right conjunctiva is not injected. Right conjunctiva has no hemorrhage. Left conjunctiva is injected. Left conjunctiva has no hemorrhage. No scleral icterus.   Neck: Neck normal appearance.  Pulmonary/Chest: Effort normal.  No respiratory distress.  Neurological: She is alert and oriented to person, place, and time.   Skin: Skin is dry.   Psychiatric: She has a normal mood and affect.         Diagnoses and all orders for this visit:    1. Acute conjunctivitis of left eye, unspecified acute conjunctivitis type (Primary)    Other orders  -     neomycin-polymyxin-dexamethasone (MAXITROL) 0.1 % ophthalmic suspension; Administer 1 drop into the left eye 4 (Four) Times a Day for 7 days.  Dispense: 5 mL; Refill: 0    Likely a corneal abrasion.   No contacts for at least 7 days.   Discouraged wearing contacts while sleeping.       FOLLOW-UP  As discussed during visit with PCP/[facility] if no improvement or Urgent Care/Emergency Department if worsening of symptoms    Patient verbalizes understanding of medication dosage, comfort measures, instructions for treatment and follow-up.    Davida Jara, JUANA  01/30/2022  10:08 EST    This visit was performed via Telehealth.  This patient has been instructed to follow-up with their primary care provider if their symptoms worsen or the treatment provided does not resolve their illness.

## 2022-01-30 NOTE — PATIENT INSTRUCTIONS
No contacts for 7 days or until eye has completely healed whichever is first  I do not recommend sleeping in your contacts or wearing them for prolonged periods  Warm compresses to remove eye discharge and warm or cool compressed for comfort.   Wash hands before and after touching eye and instilling eye drops   If symptoms do not improve on 7 days or if symptoms worsen anytime follow up with your optometrist, primary care provider or urgent care.           Corneal Abrasion    A corneal abrasion is a scratch or injury to the clear covering over the front of your eye (cornea). This can be painful. It is important to get treatment for a corneal abrasion. If this problem is not treated, it can affect your eyesight (vision).  What are the causes?  · A poke in the eye.  · An object in the eye.  · Too much eye rubbing.  · Very dry eyes.  · Certain eye infections.  · Contact lenses that do not fit right or are worn for too long. You can also injure your cornea when putting contact lenses in your eye or taking them out.  · Eye surgery.  · Certain cornea problems may increase the chance of a corneal abrasion.  Sometimes, the cause is not known.  What are the signs or symptoms?  · Eye pain. The pain may get worse when you open and close your eye or when you move your eye.  · A feeling of something stuck in your eye.  · Tearing, redness, and sensitivity to light.  · Having trouble keeping your eye open, or not being able to keep it open.  · Blurred vision.  · Headache.  How is this diagnosed?  You may work with a health care provider who specializes in conditions of the eye (ophthalmologist). This condition may be diagnosed based on your medical history, symptoms, and an eye exam.  How is this treated?  · Washing out your eye.  · Removing anything that is stuck in your eye.  · Using antibiotic drops or ointment to treat or prevent an infection.  · Using a dilating drop to decrease irritation, swelling, and pain.  · Using  steroid drops or ointment to treat redness, irritation, or swelling.  · Applying a cold, wet cloth (cold compress) or ice pack to ease the pain  · Taking pain medicine by mouth.lens wear as it can increase the chance of infection in these eyes.  · Follow these instructions at home:  · Medicines  · Use eye drops or ointments as told by your doctor.  · If you were prescribed antibiotic drops or ointment, use them as told by your doctor. Do not stop using the antibiotic even if you start to feel better.  · Take over-the-counter and prescription medicines only as told by your doctor.  · Ask your doctor if the medicine prescribed to you:  ? Requires you to avoid driving or using heavy machinery.  ? Can cause trouble pooping (constipation). You may need to take these actions to prevent or treat trouble pooping:  § Drink enough fluid to keep your pee (urine) pale yellow.  § Take over-the-counter or prescription medicines.  § Eat foods that are high in fiber. These include beans, whole grains, and fresh fruits and vegetables.  § Limit foods that are high in fat and processed sugars. These include fried or sweet foods.  Using an eye patch  If you have an eye patch, wear it as told by your doctor.  · Do not drive or use machinery while wearing an eye patch.  · Follow instructions from your doctor about when to take off the patch.  General instructions  · Ask your doctor if you can use a cold, wet cloth on your eye to help with pain.  · Do not rub or touch your eye. Do not wash out your eye.  · Do not wear contact lenses until your doctor says that this is okay.  · Avoid bright light.  · Avoid straining your eyes.  · Keep all follow-up visits as told by your doctor. Doing this can help to prevent infection and loss of eyesight.  Contact a doctor if:  · You keep having eye pain and other symptoms for more than 2 days.  · You get new symptoms, such as more redness, watery eyes, or discharge.  · You have discharge that makes your  eyelids stick together in the morning.  · Your eye patch becomes so loose that you can blink your eye.  · Symptoms come back after your eye heals.  Get help right away if:  · You have very bad eye pain that does not get better with medicine.  · You lose eyesight.  Summary  · A corneal abrasion is a scratch or injury to the clear covering over the front of the eye (cornea).  · It is important to get treatment for a corneal abrasion. If this problem is not treated, it can affect your eyesight (vision).  · Use eye drops or ointments as told by your doctor.  · If you have an eye patch, do not drive or use machinery while wearing it.  · Let your doctor know if your symptoms last for more than 2 days.  This information is not intended to replace advice given to you by your health care provider. Make sure you discuss any questions you have with your health care provider.  Document Revised: 04/24/2020 Document Reviewed: 04/24/2020  [employer] Patient Education © 2021 [employer]      Bacterial Conjunctivitis, Adult  Bacterial conjunctivitis is an infection of your conjunctiva. This is the clear membrane that covers the white part of your eye and the inner part of your eyelid. This infection can make your eye:  · Red or pink.  · Itchy.  This condition spreads easily from person to person (is contagious) and from one eye to the other eye.  What are the causes?  · This condition is caused by germs (bacteria). You may get the infection if you come into close contact with:  ? A person who has the infection.  ? Items that have germs on them (are contaminated), such as face towels, contact lens solution, or eye makeup.  What increases the risk?  You are more likely to get this condition if you:  · Have contact with people who have the infection.  · Wear contact lenses.  · Have a sinus infection.  · Have had a recent eye injury or surgery.  · Have a weak body defense system (immune system).  · Have dry eyes.  What are the signs or  symptoms?    · Thick, yellowish discharge from the eye.  · Tearing or watery eyes.  · Itchy eyes.  · Burning feeling in your eyes.  · Eye redness.  · Swollen eyelids.  · Blurred vision.  How is this treated?    · Antibiotic eye drops or ointment.  · Antibiotic medicine taken by mouth. This is used for infections that do not get better with drops or ointment or that last more than 10 days.  · Cool, wet cloths placed on the eyes.  · Artificial tears used 2-6 times a day.  Follow these instructions at home:  Medicines  · Take or apply your antibiotic medicine as told by your doctor. Do not stop taking or applying the antibiotic even if you start to feel better.  · Take or apply over-the-counter and prescription medicines only as told by your doctor.  · Do not touch your eyelid with the eye-drop bottle or the ointment tube.  Managing discomfort  · Wipe any fluid from your eye with a warm, wet washcloth or a cotton ball.  · Place a clean, cool, wet cloth on your eye. Do this for 10-20 minutes, 3-4 times per day.  General instructions  · Do not wear contacts until the infection is gone. Wear glasses until your doctor says it is okay to wear contacts again.  · Do not wear eye makeup until the infection is gone. Throw away old eye makeup.  · Change or wash your pillowcase every day.  · Do not share towels or washcloths.  · Wash your hands often with soap and water. Use paper towels to dry your hands.  · Do not touch or rub your eyes.  · Do not drive or use heavy machinery if your vision is blurred.  Contact a doctor if:  · You have a fever.  · You do not get better after 10 days.  Get help right away if:  · You have a fever and your symptoms get worse all of a sudden.  · You have very bad pain when you move your eye.  · Your face:  ? Hurts.  ? Is red.  ? Is swollen.  · You have sudden loss of vision.  Summary  · Bacterial conjunctivitis is an infection of your conjunctiva.  · This infection spreads easily from person to  person.  · Wash your hands often with soap and water. Use paper towels to dry your hands.  · Take or apply your antibiotic medicine as told by your doctor.  · Contact a doctor if you have a fever or you do not get better after 10 days.  This information is not intended to replace advice given to you by your health care provider. Make sure you discuss any questions you have with your health care provider.  Document Revised: 04/07/2020 Document Reviewed: 07/24/2019  [employer] Patient Education © 2021 [employer]

## 2022-03-01 ENCOUNTER — OFFICE VISIT (OUTPATIENT)
Dept: FAMILY MEDICINE CLINIC | Facility: CLINIC | Age: 52
End: 2022-03-01

## 2022-03-01 VITALS
HEART RATE: 75 BPM | DIASTOLIC BLOOD PRESSURE: 84 MMHG | RESPIRATION RATE: 14 BRPM | BODY MASS INDEX: 35.22 KG/M2 | TEMPERATURE: 96.9 F | OXYGEN SATURATION: 98 % | HEIGHT: 62 IN | SYSTOLIC BLOOD PRESSURE: 130 MMHG | WEIGHT: 191.4 LBS

## 2022-03-01 DIAGNOSIS — E66.09 CLASS 2 OBESITY DUE TO EXCESS CALORIES WITH BODY MASS INDEX (BMI) OF 35.0 TO 35.9 IN ADULT, UNSPECIFIED WHETHER SERIOUS COMORBIDITY PRESENT: Primary | ICD-10-CM

## 2022-03-01 PROBLEM — E66.812 CLASS 2 OBESITY DUE TO EXCESS CALORIES WITH BODY MASS INDEX (BMI) OF 35.0 TO 35.9 IN ADULT: Status: ACTIVE | Noted: 2019-08-09

## 2022-03-01 PROCEDURE — 99213 OFFICE O/P EST LOW 20 MIN: CPT | Performed by: PHYSICIAN ASSISTANT

## 2022-03-01 NOTE — PROGRESS NOTES
Chief Complaint   Patient presents with   • Weight Loss     6 month f/u       HPI     Laure Valentin is a 51 y.o. female with a PMH of obesity s/p bariatric surgery who is here for 6 month follow-up of obesity.     Weight on scale is up and down but she is losing inches when she measures at her boot camp class at the gym. She has been doing this twice weekly at the gym. She also does zully, kettle bell classes several times weekly. Doing well with no sweets but struggling to follow low carbohydrate diet.    Past Medical History:   Diagnosis Date   • GERD (gastroesophageal reflux disease)     resolved w/ WLS   • History of kidney stones 2011    most recent 12/24/17   • History of transfusion of 4 units of packed RBC 05/2019   • Intramural uterine fibroid 2018   • Iron deficiency anemia 10/4/2017    -Has had colonoscopy.  -Gastric sleeve 1/2018 but patient states anemia predates her bariatric surgery -Requiring iron infusions last year.  -8/10/2018 h/h 9/29, iron 20.  -Ferrous sulfate 325 mg TID, repeat labs 1 month.    • Microcytic anemia    • Obesity        Past Surgical History:   Procedure Laterality Date   • CERVICAL CERCLAGE  2001   • COLONOSCOPY  2011   • D & C WITH SUCTION  1997   • GASTRIC SLEEVE LAPAROSCOPIC N/A 1/26/2017    Procedure: GASTRIC SLEEVE LAPAROSCOPY   • KNEE MENISCECTOMY Right 12/2018   • LAPAROSCOPIC TUBAL LIGATION  2002   • VT LAP,ESOPHAGOGAST FUNDOPLASTY N/A 1/26/2017    Procedure: HIATAL HERNIA REPAIR LAPAROSCOPIC;  Surgeon: Eulalio Dick MD   • WISDOM TOOTH EXTRACTION  1992       Family History   Problem Relation Age of Onset   • Hypertension Mother    • Hyperlipidemia Mother    • Osteoarthritis Mother    • No Known Problems Father    • Breast cancer Neg Hx    • Ovarian cancer Neg Hx        Social History     Socioeconomic History   • Marital status:    Tobacco Use   • Smoking status: Never Smoker   • Smokeless tobacco: Never Used   Vaping Use   • Vaping Use: Never used    Substance and Sexual Activity   • Alcohol use: No   • Drug use: No   • Sexual activity: Defer       No Known Allergies    ROS    Review of Systems   Constitutional: Negative for fatigue.       Vitals:    03/01/22 1449   BP: 130/84   Pulse: 75   Resp: 14   Temp: 96.9 °F (36.1 °C)   SpO2: 98%     Body mass index is 35.01 kg/m².      Current Outpatient Medications:   •  cyclobenzaprine (FLEXERIL) 10 MG tablet, Take 0.5-1 tablets by mouth 3 (Three) Times a Day As Needed for Muscle Spasms., Disp: 60 tablet, Rfl: 1  •  diclofenac (VOLTAREN) 1 % gel gel, Apply 4 g topically to the appropriate area as directed 4 (Four) Times a Day., Disp: 100 g, Rfl: 5  •  Dietary Management Product (Rheumate) capsule, Take 1 capsule by mouth Daily., Disp: 90 capsule, Rfl: 1  •  DULoxetine (CYMBALTA) 60 MG capsule, Take 1 capsule by mouth Daily., Disp: 30 capsule, Rfl: 0  •  ferrous sulfate 325 (65 FE) MG tablet, Take 1 tablet by mouth Daily., Disp: 90 tablet, Rfl: 1  •  levonorgestrel (MIRENA) 20 MCG/24HR IUD, , Disp: , Rfl:   •  Multiple Vitamins-Minerals (MULTIVITAMIN ADULT PO), Take 1 tablet by mouth Daily., Disp: , Rfl:   •  phentermine (ADIPEX-P) 37.5 MG tablet, Take 37.5 mg by mouth Daily., Disp: , Rfl:   •  TURMERIC PO, Take  by mouth., Disp: , Rfl:   •  vitamin B-6 (PYRIDOXINE) 100 MG tablet, Take 1 tablet by mouth Daily., Disp: 30 tablet, Rfl: 0    PE    Physical Exam  Vitals reviewed.   Constitutional:       General: She is not in acute distress.  Pulmonary:      Effort: Pulmonary effort is normal. No respiratory distress.   Neurological:      Mental Status: She is alert.   Psychiatric:         Mood and Affect: Mood normal.         Results    Results for orders placed or performed in visit on 03/05/21   Ferritin    Specimen: Blood   Result Value Ref Range    Ferritin 19.80 13.00 - 150.00 ng/mL   Iron Profile    Specimen: Blood   Result Value Ref Range    Iron 125 37 - 145 mcg/dL    Iron Saturation 25 20 - 50 %    Transferrin  342 200 - 360 mg/dL    TIBC 510 298 - 536 mcg/dL   CBC Auto Differential    Specimen: Blood   Result Value Ref Range    WBC 4.49 3.40 - 10.80 10*3/mm3    RBC 4.65 3.77 - 5.28 10*6/mm3    Hemoglobin 12.3 12.0 - 15.9 g/dL    Hematocrit 39.2 34.0 - 46.6 %    MCV 84.3 79.0 - 97.0 fL    MCH 26.5 (L) 26.6 - 33.0 pg    MCHC 31.4 (L) 31.5 - 35.7 g/dL    RDW 13.9 12.3 - 15.4 %    RDW-SD 42.2 37.0 - 54.0 fl    MPV 11.5 6.0 - 12.0 fL    Platelets 373 140 - 450 10*3/mm3    Neutrophil % 46.4 42.7 - 76.0 %    Lymphocyte % 42.5 19.6 - 45.3 %    Monocyte % 8.9 5.0 - 12.0 %    Eosinophil % 0.7 0.3 - 6.2 %    Basophil % 1.3 0.0 - 1.5 %    Immature Grans % 0.2 0.0 - 0.5 %    Neutrophils, Absolute 2.08 1.70 - 7.00 10*3/mm3    Lymphocytes, Absolute 1.91 0.70 - 3.10 10*3/mm3    Monocytes, Absolute 0.40 0.10 - 0.90 10*3/mm3    Eosinophils, Absolute 0.03 0.00 - 0.40 10*3/mm3    Basophils, Absolute 0.06 0.00 - 0.20 10*3/mm3    Immature Grans, Absolute 0.01 0.00 - 0.05 10*3/mm3    nRBC 0.0 0.0 - 0.2 /100 WBC       A/P    Problem List Items Addressed This Visit        Endocrine and Metabolic    Class 2 obesity due to excess calories with body mass index (BMI) of 35.0 to 35.9 in adult - Primary  -Weight is up 2 pounds compared to 6 months ago   -Discussed dietary and lifestyle improvements. She would like to try to avoid eating so late in the evening.  We also discussed tracking her calories for a few weeks, trying snacks that contain more protein and fewer carbohydrates. She is considering a follow-up with her bariatric surgeon  -Recommended patient complete lab orders that were requested in September at her physical.  -Return to clinic in 6 months for physical or sooner if needed          Plan of care was reviewed with patient at the conclusion of today's visit. Education was provided regarding diagnoses, management, and the importance of keeping follow-up appointments. The patient was counseled regarding the risks, benefits, and possible  side-effects of treatment. Patient and/or family express understanding and agreement with the management plan.        JAMES Guerrero  Answers for HPI/ROS submitted by the patient on 2/22/2022  Please describe your symptoms.: none  Have you had these symptoms before?: No  How long have you been having these symptoms?: 1-4 days  What is the primary reason for your visit?: Other

## 2022-03-25 ENCOUNTER — LAB (OUTPATIENT)
Dept: LAB | Facility: HOSPITAL | Age: 52
End: 2022-03-25

## 2022-03-25 ENCOUNTER — OFFICE VISIT (OUTPATIENT)
Dept: OBSTETRICS AND GYNECOLOGY | Facility: CLINIC | Age: 52
End: 2022-03-25

## 2022-03-25 VITALS
DIASTOLIC BLOOD PRESSURE: 80 MMHG | RESPIRATION RATE: 14 BRPM | WEIGHT: 195 LBS | BODY MASS INDEX: 35.67 KG/M2 | SYSTOLIC BLOOD PRESSURE: 128 MMHG

## 2022-03-25 DIAGNOSIS — Z30.431 CHECKING OF INTRAUTERINE DEVICE: ICD-10-CM

## 2022-03-25 DIAGNOSIS — Z00.00 PREVENTATIVE HEALTH CARE: ICD-10-CM

## 2022-03-25 DIAGNOSIS — Z71.85 VACCINE COUNSELING: ICD-10-CM

## 2022-03-25 DIAGNOSIS — Z01.419 WELL WOMAN EXAM WITH ROUTINE GYNECOLOGICAL EXAM: Primary | ICD-10-CM

## 2022-03-25 PROBLEM — R51.9 OCCIPITAL HEADACHE: Status: RESOLVED | Noted: 2021-05-30 | Resolved: 2022-03-25
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
## 2022-03-25 PROBLEM — M50.30 DDD (DEGENERATIVE DISC DISEASE), CERVICAL: Status: RESOLVED | Noted: 2021-05-30 | Resolved: 2022-03-25

## 2022-03-25 PROBLEM — M50.30 CERVICAL DISCOGENIC PAIN SYNDROME: Status: RESOLVED | Noted: 2021-05-30 | Resolved: 2022-03-25

## 2022-03-25 PROBLEM — M47.812 CERVICAL SPONDYLOSIS WITHOUT MYELOPATHY: Status: RESOLVED | Noted: 2021-05-30 | Resolved: 2022-03-25

## 2022-03-25 PROBLEM — S13.4XXA WHIPLASH INJURY TO NECK: Status: RESOLVED | Noted: 2021-05-30 | Resolved: 2022-03-25

## 2022-03-25 PROBLEM — M50.20 CERVICAL DISCOGENIC PAIN SYNDROME: Status: RESOLVED | Noted: 2021-05-30 | Resolved: 2022-03-25

## 2022-03-25 PROBLEM — D50.0 IRON DEFICIENCY ANEMIA DUE TO CHRONIC BLOOD LOSS: Status: RESOLVED | Noted: 2017-10-04 | Resolved: 2022-03-25

## 2022-03-25 LAB
25(OH)D3 SERPL-MCNC: 39.5 NG/ML (ref 30–100)
ALBUMIN SERPL-MCNC: 4.5 G/DL (ref 3.5–5.2)
ALBUMIN/GLOB SERPL: 1.5 G/DL
ALP SERPL-CCNC: 64 U/L (ref 39–117)
ALT SERPL W P-5'-P-CCNC: 19 U/L (ref 1–33)
ANION GAP SERPL CALCULATED.3IONS-SCNC: 8 MMOL/L (ref 5–15)
AST SERPL-CCNC: 23 U/L (ref 1–32)
BILIRUB SERPL-MCNC: 0.5 MG/DL (ref 0–1.2)
BUN SERPL-MCNC: 8 MG/DL (ref 6–20)
BUN/CREAT SERPL: 12.3 (ref 7–25)
CALCIUM SPEC-SCNC: 9.4 MG/DL (ref 8.6–10.5)
CHLORIDE SERPL-SCNC: 104 MMOL/L (ref 98–107)
CHOLEST SERPL-MCNC: 215 MG/DL (ref 0–200)
CO2 SERPL-SCNC: 28 MMOL/L (ref 22–29)
CREAT SERPL-MCNC: 0.65 MG/DL (ref 0.57–1)
DEPRECATED RDW RBC AUTO: 42.4 FL (ref 37–54)
EGFRCR SERPLBLD CKD-EPI 2021: 107.4 ML/MIN/1.73
ERYTHROCYTE [DISTWIDTH] IN BLOOD BY AUTOMATED COUNT: 13.3 % (ref 12.3–15.4)
FERRITIN SERPL-MCNC: 20.7 NG/ML (ref 13–150)
GLOBULIN UR ELPH-MCNC: 3 GM/DL
GLUCOSE SERPL-MCNC: 94 MG/DL (ref 65–99)
HCT VFR BLD AUTO: 38.6 % (ref 34–46.6)
HDLC SERPL-MCNC: 70 MG/DL (ref 40–60)
HGB BLD-MCNC: 12.1 G/DL (ref 12–15.9)
IRON 24H UR-MRATE: 106 MCG/DL (ref 37–145)
IRON SATN MFR SERPL: 20 % (ref 20–50)
LDLC SERPL CALC-MCNC: 138 MG/DL (ref 0–100)
LDLC/HDLC SERPL: 1.96 {RATIO}
MCH RBC QN AUTO: 27.3 PG (ref 26.6–33)
MCHC RBC AUTO-ENTMCNC: 31.3 G/DL (ref 31.5–35.7)
MCV RBC AUTO: 87.1 FL (ref 79–97)
PLATELET # BLD AUTO: 361 10*3/MM3 (ref 140–450)
PMV BLD AUTO: 11.1 FL (ref 6–12)
POTASSIUM SERPL-SCNC: 5 MMOL/L (ref 3.5–5.2)
PROT SERPL-MCNC: 7.5 G/DL (ref 6–8.5)
RBC # BLD AUTO: 4.43 10*6/MM3 (ref 3.77–5.28)
SODIUM SERPL-SCNC: 140 MMOL/L (ref 136–145)
TIBC SERPL-MCNC: 532 MCG/DL (ref 298–536)
TRANSFERRIN SERPL-MCNC: 357 MG/DL (ref 200–360)
TRIGL SERPL-MCNC: 40 MG/DL (ref 0–150)
TSH SERPL DL<=0.05 MIU/L-ACNC: 1.01 UIU/ML (ref 0.27–4.2)
VIT B12 BLD-MCNC: 1983 PG/ML (ref 211–946)
VLDLC SERPL-MCNC: 7 MG/DL (ref 5–40)
WBC NRBC COR # BLD: 4.41 10*3/MM3 (ref 3.4–10.8)

## 2022-03-25 PROCEDURE — 99396 PREV VISIT EST AGE 40-64: CPT | Performed by: OBSTETRICS & GYNECOLOGY

## 2022-03-25 PROCEDURE — 84443 ASSAY THYROID STIM HORMONE: CPT

## 2022-03-25 PROCEDURE — 83540 ASSAY OF IRON: CPT

## 2022-03-25 PROCEDURE — 82728 ASSAY OF FERRITIN: CPT

## 2022-03-25 PROCEDURE — 84466 ASSAY OF TRANSFERRIN: CPT

## 2022-03-25 PROCEDURE — 82306 VITAMIN D 25 HYDROXY: CPT

## 2022-03-25 PROCEDURE — 85027 COMPLETE CBC AUTOMATED: CPT

## 2022-03-25 PROCEDURE — 82607 VITAMIN B-12: CPT

## 2022-03-25 PROCEDURE — 80053 COMPREHEN METABOLIC PANEL: CPT

## 2022-03-25 PROCEDURE — 80061 LIPID PANEL: CPT

## 2022-03-25 PROCEDURE — 81001 URINALYSIS AUTO W/SCOPE: CPT

## 2022-03-25 NOTE — PROGRESS NOTES
Subjective   Chief Complaint   Patient presents with   • Gynecologic Exam     Laure Valentin is a 51 y.o. year old  presenting to be seen for her annual exam.     SEXUAL Hx:  She is currently sexually active.  In the past year there there has been NO new sexual partners.    Condoms are never used.  She would not like to be screened for STD's at today's exam.  Current birth control method: tubal ligation.  She is happy with her current method of contraception and does not want to discuss alternative methods of contraception.  MENSTRUAL Hx:  No LMP recorded (lmp unknown). Patient has had an implant.  In the past 6 months her cycles have been absent.  Her menstrual flow has been absent.   Each month on average there are roughly 0 day(s) of very heavy flow.  Intermenstrual bleeding is absent.    Post-coital bleeding is absent.  Dysmenorrhea: is not affecting her activities of daily living  PMS: is not affecting her activities of daily living  Her cycles are not a source of concern for her that she wishes to discuss today.  HEALTH Hx:  She exercises regularly: yes.  She wears her seat belt: yes.  She has concerns about domestic violence: no.  OTHER THINGS SHE WANTS TO DISCUSS TODAY:  Nothing else    The following portions of the patient's history were reviewed and updated as appropriate:problem list, current medications, allergies, past family history, past medical history, past social history and past surgical history.    Social History    Tobacco Use      Smoking status: Never Smoker      Smokeless tobacco: Never Used    Review of Systems  Constitutional POS: nothing reported    NEG: anorexia or night sweats   Genitourinary POS: nothing reported    NEG: dysuria or hematuria      Gastointestinal POS: nothing reported    NEG: bloating, change in bowel habits, melena or reflux symptoms   Integument POS: nothing reported    NEG: moles that are changing in size, shape, color or rashes   Breast POS: nothing  reported    NEG: persistent breast lump, skin dimpling or nipple discharge        Objective   /80   Resp 14   Wt 88.5 kg (195 lb)   LMP  (LMP Unknown)   Breastfeeding No   BMI 35.67 kg/m²     General:  well developed; well nourished  no acute distress   Skin:  No suspicious lesions seen   Thyroid: normal to inspection and palpation   Breasts:  Examined in supine position  Symmetric without masses or skin dimpling  Nipples normal without inversion, lesions or discharge  There are no palpable axillary nodes   Abdomen: soft, non-tender; no masses  no umbilical or inguinal hernias are present  no hepato-splenomegaly   Pelvis: Clinical staff was present for exam  External genitalia:  normal appearance of the external genitalia including Bartholin's and [place]'s glands.  :  urethral meatus normal;  Vaginal:  normal pink mucosa without prolapse or lesions.  Cervix:  normal appearance. IUD string present - 2 cms in length;  Uterus:  normal size, shape and consistency.  Adnexa:  normal bimanual exam of the adnexa.  Rectal:  digital rectal exam not performed; anus visually normal appearing.        Assessment   1. Normal GYN exam  2. Amenorrhea - most consistent with hormonal IUD use  3. She is up to date on all relevant gynecologic and colorectal screenings     Plan   1. Pap was not done today.  I explained to Laure that the recommendations for Pap smear interval in a low risk patient has lengthened to 3 years time.  I told Laure she still needs to be seen in our office yearly for a full physical including breast and pelvic exam.  2. She was encouraged to get yearly mammograms.  She should report any palpable breast lump(s) or skin changes regardless of mammographic findings.  I explained to Laure that notification regarding her mammogram results will come from the center performing the study.  Our office will not be routinely calling with mammogram results.  It is her responsibility to make sure that the results  from the mammogram are communicated to her by the breast center.  If she has any questions about the results, she is welcome to call our office anytime.  3. Reviewed with Laure that Eri now has a 7-year indication  4. Her vaccine record was reviewed and updated.  5. I discussed with Laure that she may be behind on needed vaccinations for TDAP and Shingles [Shingrix].  She may be able to obtain these vaccinations at her local pharmacy OR speak about obtaining them with her primary care.  If she does obtain her vaccines, I have asked Laure to let us know the date each vaccine was obtained so that her medical record could be updated in our system.  6. The importance of keeping all planned follow-up and taking all medications as prescribed was emphasized.  7. Follow up for annual exam 1 year           This note was electronically signed.    Maged Marcelo M.D.  March 25, 2022    Part of this note may be an electronic transcription/translation of spoken language to printed text using the Dragon Dictation System.

## 2022-03-25 NOTE — PATIENT INSTRUCTIONS
Tdap Vaccine (Tetanus, Diphtheria and Pertussis): What You Need to Know      1. Why get vaccinated?  Tetanus, diphtheria and pertussis are very serious diseases. Tdap vaccine can protect us from these diseases. And, Tdap vaccine given to pregnant women can protect  babies against pertussis..  TETANUS (Lockjaw) is rare in the [place] today. It causes painful muscle tightening and stiffness, usually all over the body.  It can lead to tightening of muscles in the head and neck so you can't open your mouth, swallow, or sometimes even breathe. Tetanus kills about 1 out of 10 people who are infected even after receiving the best medical care.  DIPHTHERIA is also rare in the [place] today. It can cause a thick coating to form in the back of the throat.  It can lead to breathing problems, heart failure, paralysis, and death.  PERTUSSIS (Whooping Cough) causes severe coughing spells, which can cause difficulty breathing, vomiting and disturbed sleep.  It can also lead to weight loss, incontinence, and rib fractures. Up to 2 in 100 adolescents and 5 in 100 adults with pertussis are hospitalized or have complications, which could include pneumonia or death.    These diseases are caused by bacteria. Diphtheria and pertussis are spread from person to person through secretions from coughing or sneezing. Tetanus enters the body through cuts, scratches, or wounds.  Before vaccines, as many as 200,000 cases of diphtheria, 200,000 cases of pertussis, and hundreds of cases of tetanus, were reported in the [place] each year. Since vaccination began, reports of cases for tetanus and diphtheria have dropped by about 99% and for pertussis by about 80%.    2. Tdap vaccine  Tdap vaccine can protect adolescents and adults from tetanus, diphtheria, and pertussis. One dose of Tdap is routinely given at age 11 or 12. People who did not get Tdap at that age should get it as soon as possible.  Tdap is especially important  for healthcare professionals and anyone having close contact with a baby younger than 12 months.  Pregnant women should get a dose of Tdap during every pregnancy, to protect the  from pertussis. Infants are most at risk for severe, life-threatening complications from pertussis.  Another vaccine, called Td, protects against tetanus and diphtheria, but not pertussis. A Td booster should be given every 10 years. Tdap may be given as one of these boosters if you have never gotten Tdap before. Tdap may also be given after a severe cut or burn to prevent tetanus infection.  Your doctor or the person giving you the vaccine can give you more information.  Tdap may safely be given at the same time as other vaccines.    3. Some people should not get this vaccine  A person who has ever had a life-threatening allergic reaction after a previous dose of any diphtheria, tetanus or pertussis containing vaccine, OR has a severe allergy to any part of this vaccine, should not get Tdap vaccine. Tell the person giving the vaccine about any severe allergies.  Anyone who had coma or long repeated seizures within 7 days after a childhood dose of DTP or DTaP, or a previous dose of Tdap, should not get Tdap, unless a cause other than the vaccine was found. They can still get Td.  Talk to your doctor if you:  have seizures or another nervous system problem,  had severe pain or swelling after any vaccine containing diphtheria, tetanus or pertussis,  ever had a condition called Guillain-Barré Syndrome (GBS),  aren't feeling well on the day the shot is scheduled.    4. Risks  With any medicine, including vaccines, there is a chance of side effects. These are usually mild and go away on their own. Serious reactions are also possible but are rare.  Most people who get Tdap vaccine do not have any problems with it.  Mild problems following Tdap  (Did not interfere with activities)  Pain where the shot was given (about 3 in 4 adolescents or  2 in 3 adults)  Redness or swelling where the shot was given (about 1 person in 5)  Mild fever of at least 100.4°F (up to about 1 in 25 adolescents or 1 in 100 adults)  Headache (about 3 or 4 people in 10)  Tiredness (about 1 person in 3 or 4)  Nausea, vomiting, diarrhea, stomach ache (up to 1 in 4 adolescents or 1 in 10 adults)  Chills, sore joints (about 1 person in 10)  Body aches (about 1 person in 3 or 4)  Rash, swollen glands (uncommon)  Moderate problems following Tdap  (Interfered with activities, but did not require medical attention)  Pain where the shot was given (up to 1 in 5 or 6)  Redness or swelling where the shot was given (up to about 1 in 16 adolescents or 1 in 12 adults)  Fever over 102°F (about 1 in 100 adolescents or 1 in 250 adults)  Headache (about 1 in 7 adolescents or 1 in 10 adults)  Nausea, vomiting, diarrhea, stomach ache (up to 1 or 3 people in 100)  Swelling of the entire arm where the shot was given (up to about 1 in 500).  Severe problems following Tdap  (Unable to perform usual activities; required medical attention)  Swelling, severe pain, bleeding and redness in the arm where the shot was given (rare).  Problems that could happen after any vaccine:  People sometimes faint after a medical procedure, including vaccination. Sitting or lying down for about 15 minutes can help prevent fainting, and injuries caused by a fall. Tell your doctor if you feel dizzy, or have vision changes or ringing in the ears.  Some people get severe pain in the shoulder and have difficulty moving the arm where a shot was given. This happens very rarely.  Any medication can cause a severe allergic reaction. Such reactions from a vaccine are very rare, estimated at fewer than 1 in a million doses, and would happen within a few minutes to a few hours after the vaccination.  As with any medicine, there is a very remote chance of a vaccine causing a serious injury or death.  The safety of vaccines is always  being monitored. For more information, visit: www.cdc.gov/vaccinesafety/    5. What if there is a serious problem?  What should I look for?  Look for anything that concerns you, such as signs of a severe allergic reaction, very high fever, or unusual behavior.  Signs of a severe allergic reaction can include hives, swelling of the face and throat, difficulty breathing, a fast heartbeat, dizziness, and weakness. These would usually start a few minutes to a few hours after the vaccination.  What should I do?  If you think it is a severe allergic reaction or other emergency that can't wait, call 9-1-1 or get the person to the nearest hospital. Otherwise, call your doctor.  Afterward, the reaction should be reported to the Vaccine Adverse Event Reporting System (VAERS). Your doctor might file this report, or you can do it yourself through the VAERS web site at www.vaers.hhs.gov, or by calling 1-448.114.5576.  VAERS does not give medical advice.    6. The National Vaccine Injury Compensation Program  The National Vaccine Injury Compensation Program (VICP) is a federal program that was created to compensate people who may have been injured by certain vaccines.  Persons who believe they may have been injured by a vaccine can learn about the program and about filing a claim by calling 1-169.369.8498 or visiting the VICP website at www.hrsa.gov/vaccinecompensation. There is a time limit to file a claim for compensation.    7. How can I learn more?  Ask your doctor. He or she can give you the vaccine package insert or suggest other sources of information.  Call your local or state health department.  Contact the Centers for Disease Control and Prevention (CDC):  Call 1-880.863.3407 (4-539-AUS-INFO) or  Visit CDC's website at www.cdc.gov/vaccines      Vaccine Information Statement Tdap Vaccine (2/24/2015)  This information is not intended to replace advice given to you by your health care provider. Make sure you discuss any  questions you have with your health care provider.  Document Released: 06/18/2013 Document Revised: 08/05/2019 Document Reviewed: 08/05/2019  [employer] Interactive Patient Education © 2019 [employer]      Zoster Vaccine, Recombinant injection (Shingrix)      What is this medicine?  ZOSTER VACCINE (ZOS ter vak SEEN) is used to prevent shingles in adults 50 years old and over. This vaccine is not used to treat shingles or nerve pain from shingles.  This medicine may be used for other purposes; ask your health care provider or pharmacist if you have questions.    What should I tell my health care provider before I take this medicine?  They need to know if you have any of these conditions:  blood disorders or disease  cancer like leukemia or lymphoma  immune system problems or therapy  an unusual or allergic reaction to vaccines, other medications, foods, dyes, or preservatives  pregnant or trying to get pregnant  breast-feeding    How should I use this medicine?  This vaccine is for injection in a muscle. It is given by a health care professional.  The vaccine series requires 2 doses for full effect  The second dose should be given somewhere between 2-6 months after the initial injection is given.    What if I miss a dose?  Keep appointments for follow-up (booster) doses as directed. It is important not to miss your dose.   Call your doctor or health care professional if you are unable to keep an appointment.    What may interact with this medicine?  medicines that suppress your immune system  medicines to treat cancer  steroid medicines like prednisone or cortisone    This list may not describe all possible interactions. Give your health care provider a list of all the medicines, herbs, non-prescription drugs, or dietary supplements you use. Also tell them if you smoke, drink alcohol, or use illegal drugs. Some items may interact with your medicine.    What should I watch for while using this medicine?  Visit your  doctor for regular check ups.  This vaccine, like all vaccines, may not fully protect everyone.    What side effects may I notice from receiving this medicine?  Side effects that you should report to your doctor or health care professional as soon as possible:  allergic reactions like skin rash, itching or hives, swelling of the face, lips, or tongue  breathing problems  Side effects that usually do not require medical attention (report these to your doctor or health care professional if they continue or are bothersome):  chills  headache  fever  nausea, vomiting  redness, warmth, pain, swelling or itching at site where injected  tiredness  This list may not describe all possible side effects. Call your doctor for medical advice about side effects. You may report side effects to FDA at 3-599-ORV-8799.    Where should I keep my medicine?  This vaccine is only given in a clinic, pharmacy, doctor's office, or other health care setting and will not be stored at home.  NOTE: This sheet is a summary. It may not cover all possible information. If you have questions about this medicine, talk to your doctor, pharmacist, or health care provider.  © 2019 [employer] (2018-07-30 13:20:30)

## 2022-03-26 LAB
BACTERIA UR QL AUTO: NORMAL /HPF
BILIRUB UR QL STRIP: NEGATIVE
CLARITY UR: CLEAR
COLOR UR: ABNORMAL
GLUCOSE UR STRIP-MCNC: NEGATIVE MG/DL
HGB UR QL STRIP.AUTO: NEGATIVE
HYALINE CASTS UR QL AUTO: NORMAL /LPF
KETONES UR QL STRIP: ABNORMAL
LEUKOCYTE ESTERASE UR QL STRIP.AUTO: ABNORMAL
NITRITE UR QL STRIP: NEGATIVE
PH UR STRIP.AUTO: 6.5 [PH] (ref 5–8)
PROT UR QL STRIP: ABNORMAL
RBC # UR STRIP: NORMAL /HPF
REF LAB TEST METHOD: NORMAL
SP GR UR STRIP: 1.03 (ref 1–1.03)
SQUAMOUS #/AREA URNS HPF: NORMAL /HPF
UROBILINOGEN UR QL STRIP: ABNORMAL
WBC # UR STRIP: NORMAL /HPF

## 2022-04-05 DIAGNOSIS — M50.20 CERVICAL DISCOGENIC PAIN SYNDROME: ICD-10-CM

## 2022-04-05 RX ORDER — DULOXETIN HYDROCHLORIDE 60 MG/1
60 CAPSULE, DELAYED RELEASE ORAL DAILY
Qty: 30 CAPSULE | Refills: 0 | Status: SHIPPED | OUTPATIENT
Start: 2022-04-05 | End: 2022-09-12 | Stop reason: SDUPTHER

## 2022-04-05 RX ORDER — ME-TETRAHYDROFOLATE/B12/HRB236 1-1-500 MG
1 CAPSULE ORAL DAILY
Qty: 90 CAPSULE | Refills: 1 | Status: SHIPPED | OUTPATIENT
Start: 2022-04-05

## 2022-08-07 NOTE — TELEPHONE ENCOUNTER
[phone] returned patient's call. Patient states she was told once she starts the Provera her bleeding will stop completely. I asked patient how is her flow since starting the Provera? Patient states she went from having to change every hour to every 3 hours. I advised patient that is normal, the medication is working. I also informed patient that per Dr. Marcelo after she finishes the Provera she should expect to have a heavy flow within a week or so but it should stop spontaneously. And Dr. Marcelo also wants her to have an ultrasound following her next menstrual cycle. Patient verbalized understanding.    complains of pain/discomfort

## 2022-08-31 ENCOUNTER — TELEPHONE (OUTPATIENT)
Dept: FAMILY MEDICINE CLINIC | Facility: CLINIC | Age: 52
End: 2022-08-31

## 2022-08-31 DIAGNOSIS — Z13.21 ENCOUNTER FOR VITAMIN DEFICIENCY SCREENING: ICD-10-CM

## 2022-08-31 DIAGNOSIS — Z00.01 ENCOUNTER FOR PREVENTATIVE ADULT HEALTH CARE EXAM WITH ABNORMAL FINDINGS: Primary | ICD-10-CM

## 2022-09-02 NOTE — TELEPHONE ENCOUNTER
Attempted to contact, no answer. Left detailed voicemail relaying provider's message     Labs were ordered. Please advise the patient to fast for these tests. Thank you.   Hub can relay and document.

## 2022-09-07 ENCOUNTER — LAB (OUTPATIENT)
Dept: LAB | Facility: HOSPITAL | Age: 52
End: 2022-09-07

## 2022-09-07 DIAGNOSIS — Z00.01 ENCOUNTER FOR PREVENTATIVE ADULT HEALTH CARE EXAM WITH ABNORMAL FINDINGS: ICD-10-CM

## 2022-09-07 DIAGNOSIS — Z13.21 ENCOUNTER FOR VITAMIN DEFICIENCY SCREENING: ICD-10-CM

## 2022-09-07 LAB
25(OH)D3 SERPL-MCNC: 37.1 NG/ML (ref 30–100)
ALBUMIN SERPL-MCNC: 4.5 G/DL (ref 3.5–5.2)
ALBUMIN/GLOB SERPL: 1.9 G/DL
ALP SERPL-CCNC: 55 U/L (ref 39–117)
ALT SERPL W P-5'-P-CCNC: 15 U/L (ref 1–33)
ANION GAP SERPL CALCULATED.3IONS-SCNC: 9 MMOL/L (ref 5–15)
AST SERPL-CCNC: 20 U/L (ref 1–32)
BILIRUB SERPL-MCNC: 0.4 MG/DL (ref 0–1.2)
BILIRUB UR QL STRIP: NEGATIVE
BUN SERPL-MCNC: 15 MG/DL (ref 6–20)
BUN/CREAT SERPL: 23.4 (ref 7–25)
CALCIUM SPEC-SCNC: 9.5 MG/DL (ref 8.6–10.5)
CHLORIDE SERPL-SCNC: 105 MMOL/L (ref 98–107)
CHOLEST SERPL-MCNC: 182 MG/DL (ref 0–200)
CLARITY UR: CLEAR
CO2 SERPL-SCNC: 27 MMOL/L (ref 22–29)
COLOR UR: YELLOW
CREAT SERPL-MCNC: 0.64 MG/DL (ref 0.57–1)
DEPRECATED RDW RBC AUTO: 40.2 FL (ref 37–54)
EGFRCR SERPLBLD CKD-EPI 2021: 107.8 ML/MIN/1.73
ERYTHROCYTE [DISTWIDTH] IN BLOOD BY AUTOMATED COUNT: 13.1 % (ref 12.3–15.4)
FOLATE SERPL-MCNC: 10.9 NG/ML (ref 4.78–24.2)
GLOBULIN UR ELPH-MCNC: 2.4 GM/DL
GLUCOSE SERPL-MCNC: 93 MG/DL (ref 65–99)
GLUCOSE UR STRIP-MCNC: NEGATIVE MG/DL
HCT VFR BLD AUTO: 35.9 % (ref 34–46.6)
HDLC SERPL-MCNC: 61 MG/DL (ref 40–60)
HGB BLD-MCNC: 11.4 G/DL (ref 12–15.9)
HGB UR QL STRIP.AUTO: NEGATIVE
IRON 24H UR-MRATE: 67 MCG/DL (ref 37–145)
IRON SATN MFR SERPL: 13 % (ref 20–50)
KETONES UR QL STRIP: NEGATIVE
LDLC SERPL CALC-MCNC: 111 MG/DL (ref 0–100)
LDLC/HDLC SERPL: 1.82 {RATIO}
LEUKOCYTE ESTERASE UR QL STRIP.AUTO: NEGATIVE
MCH RBC QN AUTO: 26.6 PG (ref 26.6–33)
MCHC RBC AUTO-ENTMCNC: 31.8 G/DL (ref 31.5–35.7)
MCV RBC AUTO: 83.9 FL (ref 79–97)
NITRITE UR QL STRIP: NEGATIVE
PH UR STRIP.AUTO: 6.5 [PH] (ref 5–8)
PLATELET # BLD AUTO: 338 10*3/MM3 (ref 140–450)
PMV BLD AUTO: 10.7 FL (ref 6–12)
POTASSIUM SERPL-SCNC: 5.5 MMOL/L (ref 3.5–5.2)
PROT SERPL-MCNC: 6.9 G/DL (ref 6–8.5)
PROT UR QL STRIP: NEGATIVE
RBC # BLD AUTO: 4.28 10*6/MM3 (ref 3.77–5.28)
SODIUM SERPL-SCNC: 141 MMOL/L (ref 136–145)
SP GR UR STRIP: 1.03 (ref 1–1.03)
TIBC SERPL-MCNC: 498 MCG/DL (ref 298–536)
TRANSFERRIN SERPL-MCNC: 334 MG/DL (ref 200–360)
TRIGL SERPL-MCNC: 49 MG/DL (ref 0–150)
TSH SERPL DL<=0.05 MIU/L-ACNC: 1.01 UIU/ML (ref 0.27–4.2)
UROBILINOGEN UR QL STRIP: NORMAL
VIT B12 BLD-MCNC: 899 PG/ML (ref 211–946)
VLDLC SERPL-MCNC: 10 MG/DL (ref 5–40)
WBC NRBC COR # BLD: 3.81 10*3/MM3 (ref 3.4–10.8)

## 2022-09-07 PROCEDURE — 81003 URINALYSIS AUTO W/O SCOPE: CPT

## 2022-09-07 PROCEDURE — 82525 ASSAY OF COPPER: CPT

## 2022-09-07 PROCEDURE — 80050 GENERAL HEALTH PANEL: CPT

## 2022-09-07 PROCEDURE — 80061 LIPID PANEL: CPT

## 2022-09-07 PROCEDURE — 82306 VITAMIN D 25 HYDROXY: CPT

## 2022-09-07 PROCEDURE — 82746 ASSAY OF FOLIC ACID SERUM: CPT

## 2022-09-07 PROCEDURE — 83540 ASSAY OF IRON: CPT

## 2022-09-07 PROCEDURE — 84425 ASSAY OF VITAMIN B-1: CPT

## 2022-09-07 PROCEDURE — 84446 ASSAY OF VITAMIN E: CPT

## 2022-09-07 PROCEDURE — 84255 ASSAY OF SELENIUM: CPT

## 2022-09-07 PROCEDURE — 84466 ASSAY OF TRANSFERRIN: CPT

## 2022-09-07 PROCEDURE — 84630 ASSAY OF ZINC: CPT

## 2022-09-07 PROCEDURE — 84590 ASSAY OF VITAMIN A: CPT

## 2022-09-07 PROCEDURE — 84597 ASSAY OF VITAMIN K: CPT

## 2022-09-07 PROCEDURE — 82607 VITAMIN B-12: CPT

## 2022-09-09 LAB
COPPER SERPL-MCNC: 122 UG/DL (ref 80–158)
SELENIUM SERPL-MCNC: 129 UG/L (ref 93–198)
ZINC SERPL-MCNC: 92 UG/DL (ref 44–115)

## 2022-09-12 ENCOUNTER — LAB (OUTPATIENT)
Dept: LAB | Facility: HOSPITAL | Age: 52
End: 2022-09-12

## 2022-09-12 DIAGNOSIS — M50.20 CERVICAL DISCOGENIC PAIN SYNDROME: ICD-10-CM

## 2022-09-12 DIAGNOSIS — Z13.21 ENCOUNTER FOR VITAMIN DEFICIENCY SCREENING: ICD-10-CM

## 2022-09-12 LAB
A-TOCOPHEROL VIT E SERPL-MCNC: 12 MG/L (ref 7–25.1)
GAMMA TOCOPHEROL SERPL-MCNC: 1.1 MG/L (ref 0.5–5.5)
VIT A SERPL-MCNC: 38.4 UG/DL (ref 20.1–62)
VIT B1 BLD-SCNC: 82.4 NMOL/L (ref 66.5–200)

## 2022-09-12 PROCEDURE — 36415 COLL VENOUS BLD VENIPUNCTURE: CPT

## 2022-09-12 PROCEDURE — 82180 ASSAY OF ASCORBIC ACID: CPT

## 2022-09-13 ENCOUNTER — OFFICE VISIT (OUTPATIENT)
Dept: FAMILY MEDICINE CLINIC | Facility: CLINIC | Age: 52
End: 2022-09-13

## 2022-09-13 VITALS
DIASTOLIC BLOOD PRESSURE: 80 MMHG | TEMPERATURE: 97.1 F | OXYGEN SATURATION: 99 % | HEIGHT: 62 IN | BODY MASS INDEX: 33.86 KG/M2 | SYSTOLIC BLOOD PRESSURE: 112 MMHG | HEART RATE: 82 BPM | WEIGHT: 184 LBS

## 2022-09-13 DIAGNOSIS — E66.09 CLASS 1 OBESITY DUE TO EXCESS CALORIES WITH BODY MASS INDEX (BMI) OF 33.0 TO 33.9 IN ADULT, UNSPECIFIED WHETHER SERIOUS COMORBIDITY PRESENT: ICD-10-CM

## 2022-09-13 DIAGNOSIS — M25.561 CHRONIC PAIN OF BOTH KNEES: ICD-10-CM

## 2022-09-13 DIAGNOSIS — E87.5 HYPERKALEMIA: ICD-10-CM

## 2022-09-13 DIAGNOSIS — Z00.00 PREVENTATIVE HEALTH CARE: Primary | ICD-10-CM

## 2022-09-13 DIAGNOSIS — M50.20 CERVICAL DISCOGENIC PAIN SYNDROME: ICD-10-CM

## 2022-09-13 DIAGNOSIS — M25.562 CHRONIC PAIN OF BOTH KNEES: ICD-10-CM

## 2022-09-13 DIAGNOSIS — Z12.31 ENCOUNTER FOR SCREENING MAMMOGRAM FOR MALIGNANT NEOPLASM OF BREAST: ICD-10-CM

## 2022-09-13 DIAGNOSIS — G89.29 CHRONIC PAIN OF BOTH KNEES: ICD-10-CM

## 2022-09-13 LAB — PHYTONADIONE SERPL-MCNC: 0.22 NG/ML (ref 0.1–2.2)

## 2022-09-13 PROCEDURE — 99396 PREV VISIT EST AGE 40-64: CPT | Performed by: PHYSICIAN ASSISTANT

## 2022-09-13 RX ORDER — DULOXETIN HYDROCHLORIDE 60 MG/1
60 CAPSULE, DELAYED RELEASE ORAL DAILY
Qty: 30 CAPSULE | Refills: 0 | OUTPATIENT
Start: 2022-09-13

## 2022-09-13 RX ORDER — DULOXETIN HYDROCHLORIDE 60 MG/1
60 CAPSULE, DELAYED RELEASE ORAL DAILY
Qty: 30 CAPSULE | Refills: 5 | Status: SHIPPED | OUTPATIENT
Start: 2022-09-13 | End: 2023-01-04 | Stop reason: SDUPTHER
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
## 2022-09-19 LAB — VIT C SERPL-MCNC: 0.5 MG/DL (ref 0.4–2)

## 2022-09-22 ENCOUNTER — HOSPITAL ENCOUNTER (OUTPATIENT)
Dept: MAMMOGRAPHY | Facility: HOSPITAL | Age: 52
Discharge: HOME | End: 2022-09-22
Admitting: PHYSICIAN ASSISTANT

## 2022-09-22 DIAGNOSIS — Z12.31 ENCOUNTER FOR SCREENING MAMMOGRAM FOR MALIGNANT NEOPLASM OF BREAST: ICD-10-CM

## 2022-09-22 PROCEDURE — 77067 SCR MAMMO BI INCL CAD: CPT

## 2022-09-22 PROCEDURE — 77063 BREAST TOMOSYNTHESIS BI: CPT

## 2022-09-22 PROCEDURE — 77067 SCR MAMMO BI INCL CAD: CPT | Performed by: RADIOLOGY

## 2022-09-22 PROCEDURE — 77063 BREAST TOMOSYNTHESIS BI: CPT | Performed by: RADIOLOGY

## 2022-10-03 ENCOUNTER — IMMUNIZATION (OUTPATIENT)
Dept: VACCINE CLINIC | Facility: HOSPITAL | Age: 52
End: 2022-10-03

## 2022-10-03 DIAGNOSIS — Z23 NEED FOR VACCINATION: Primary | ICD-10-CM

## 2022-10-03 PROCEDURE — 0124A: CPT | Performed by: HOSPITALIST

## 2022-10-03 PROCEDURE — 91312 HC SARSCOV2 VAC 30MCG/0.3ML IM BIVALENT BOOSTER 12 YRS AND OLDER: CPT | Performed by: HOSPITALIST

## 2023-01-04 ENCOUNTER — OFFICE VISIT (OUTPATIENT)
Dept: PAIN MEDICINE | Facility: CLINIC | Age: 53
End: 2023-01-04
Payer: COMMERCIAL

## 2023-01-04 VITALS
SYSTOLIC BLOOD PRESSURE: 120 MMHG | HEIGHT: 62 IN | WEIGHT: 198.4 LBS | DIASTOLIC BLOOD PRESSURE: 84 MMHG | RESPIRATION RATE: 12 BRPM | HEART RATE: 77 BPM | OXYGEN SATURATION: 99 % | BODY MASS INDEX: 36.51 KG/M2 | TEMPERATURE: 96.2 F

## 2023-01-04 DIAGNOSIS — M47.812 CERVICAL SPONDYLOSIS WITHOUT MYELOPATHY: ICD-10-CM

## 2023-01-04 DIAGNOSIS — M50.20 CERVICAL DISCOGENIC PAIN SYNDROME: ICD-10-CM

## 2023-01-04 DIAGNOSIS — S13.4XXS WHIPLASH INJURY TO NECK, SEQUELA: ICD-10-CM

## 2023-01-04 DIAGNOSIS — M50.30 DDD (DEGENERATIVE DISC DISEASE), CERVICAL: ICD-10-CM
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
## 2023-01-04 PROCEDURE — 99213 OFFICE O/P EST LOW 20 MIN: CPT | Performed by: NURSE PRACTITIONER

## 2023-01-04 RX ORDER — DULOXETIN HYDROCHLORIDE 60 MG/1
60 CAPSULE, DELAYED RELEASE ORAL DAILY
Qty: 30 CAPSULE | Refills: 5 | Status: SHIPPED | OUTPATIENT
Start: 2023-01-04
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
## 2023-02-13 NOTE — ANESTHESIA PROCEDURE NOTES
"Peripheral Block    Patient location during procedure: OR  Start time: 1/26/2017 10:47 AM  Stop time: 1/26/2017 10:52 AM  Reason for block: at surgeon's request and post-op pain management  Performed by  Anesthesiologist: BETINA QIU  CRNA: ELIESER SUTTON  Preanesthetic Checklist  Completed: patient identified, site marked, surgical consent, pre-op evaluation, timeout performed, IV checked, risks and benefits discussed and monitors and equipment checked  Peripheral Block Prep:  Sterile barriers:cap, gloves, sterile barriers and mask  Prep: ChloraPrep  Patient monitoring: blood pressure monitoring, continuous pulse oximetry and EKG  Peripheral Procedure  Nursing cardiac assessment comments yes: Sedation, GA, Spinal,Epidural   Guidance:ultrasound guided  Images:still images obtained  Laterality:BilateralBlock Type:TAP  Injection Technique:single-shotNeedle Type:short-bevel  Needle Gauge:20 G  Needle gauge: 20g 4\" Stimuplex.   Medications  Comment:Block Injection:  LA dose divided between Right and Left block       Adjuncts:  Decadron 4mg PSF, Buprenex 0.3mg (Per total volume of LA)  Local Injected:bupivacaine 0.25% without epinephrine Local Amount Injected:60mL  Post Assessment  Patient Tolerance:comfortable throughout block  Complications:no  Additional Notes  The pt was placed in the Supine Position and was  anesthetized with:       General Anesthesia     Under Ultrasound guidance, a BBraun 4inch 360 degree needle was advanced with Normal Saline hydro dissection of tissue.  The Internal Oblique and Transversus Abdominus muscles where visualized.  At or before the aponeurosis of Internal Oblique, local anesthetic spread was visualized in the Transversus Abdominus Plane. Injection was made incrementally with aspiration every 5 mls.  There was no  intravascular injection,  injection pressure was normal, there was no neural injection, and the procedure was completed without difficulty.  Thank You.            " [id number] nurse - Please advise patient to contact her specialists Re:  Renal Dr Yang Huge concerns -     -Imbalance with walking at least several weeks part of this may be with her recent herpes zoster  - Neuro Ms  Jelani Brown / Dr Sharon Guerra - last seen for same 23  -GI symptoms including reflux and gas - GI Mr  Steven MILTON Fleming/ Dr Lori Cheung - last seen for same 22  From Renal Dr Danna Farmer - 23    MD Jluis Koehler, DO; Marcie Lovett, DO; Vladislav Perea, DO  Hi guys! Please see my message below   Thank you please call if any questions           Previous Messages     ----- Message -----   From: Oj Zee MD   Sent: 2023   1:17 PM EST   To: Dg Rowell MD     Out of Contact Notification   Recipient is Out of Contact: Oj Zee MD (2023, 12:00 AM to 2023, 11:59 PM)   Comments: Medical Leave   Delegates:   ALEXANDRA Brown   Message Type: 1-Staff Message   _____________________________________________________   Original Message:     Action:   Subject:   Patient: Geni Cobos [[id number]] (: 1948)   Phone:     Message:   Kenton Marley afternoon [facility] all is well! Lamine Osorio came to see me on a routine visit today, she is complaining of several things including the following   -Urinary symptoms including incontinence and frequency, I will order a formal UA with reflex and treat, I believe she has a urology appointment in the next few weeks   -URI type symptoms they do not seem significant at the moment   -Imbalance with walking at least several weeks part of this may be with her recent herpes zoster   -GI symptoms including reflux and gas     I was wondering if it be possible if you could see the patient soon with numerous problems I just wanted to make sure that she is addressing all of this!      As always really appreciate your help   Regards   Glenn         Sent by: Hammad King To: Lisbeth Galvin MD   Date: 02/13/2023   Priority: High
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
## 2023-03-30 ENCOUNTER — LAB (OUTPATIENT)
Dept: LAB | Facility: HOSPITAL | Age: 53
End: 2023-03-30
Payer: COMMERCIAL

## 2023-03-30 ENCOUNTER — OFFICE VISIT (OUTPATIENT)
Dept: OBSTETRICS AND GYNECOLOGY | Facility: CLINIC | Age: 53
End: 2023-03-30
Payer: COMMERCIAL

## 2023-03-30 VITALS
DIASTOLIC BLOOD PRESSURE: 80 MMHG | WEIGHT: 198 LBS | SYSTOLIC BLOOD PRESSURE: 124 MMHG | BODY MASS INDEX: 36.21 KG/M2 | RESPIRATION RATE: 14 BRPM

## 2023-03-30 DIAGNOSIS — N91.2 AMENORRHEA: ICD-10-CM

## 2023-03-30 DIAGNOSIS — Z01.419 WELL WOMAN EXAM WITH ROUTINE GYNECOLOGICAL EXAM: Primary | ICD-10-CM

## 2023-03-30 DIAGNOSIS — Z30.431 CHECKING OF INTRAUTERINE DEVICE: ICD-10-CM

## 2023-03-30 DIAGNOSIS — Z71.85 VACCINE COUNSELING: ICD-10-CM

## 2023-03-30 LAB — FSH SERPL-ACNC: 93.2 MIU/ML

## 2023-03-30 PROCEDURE — 99396 PREV VISIT EST AGE 40-64: CPT | Performed by: OBSTETRICS & GYNECOLOGY

## 2023-03-30 PROCEDURE — 36415 COLL VENOUS BLD VENIPUNCTURE: CPT

## 2023-03-30 PROCEDURE — 83001 ASSAY OF GONADOTROPIN (FSH): CPT

## 2023-05-23 ENCOUNTER — PATIENT MESSAGE (OUTPATIENT)
Dept: FAMILY MEDICINE CLINIC | Facility: CLINIC | Age: 53
End: 2023-05-23
Payer: COMMERCIAL

## 2023-05-23 NOTE — TELEPHONE ENCOUNTER
From: Laure Valentin  To: Hugo Garcia  Sent: 5/23/2023 1:37 PM EDT  Subject: wegovy    I'd like to have an order for the blood work needed to be approved for Wegovy with a follow-up appt scheduled with you once the lab work comes back.   Thank you  Laure

## 2023-05-26 ENCOUNTER — TELEPHONE (OUTPATIENT)
Dept: FAMILY MEDICINE CLINIC | Facility: CLINIC | Age: 53
End: 2023-05-26
Payer: COMMERCIAL

## 2023-05-26 NOTE — TELEPHONE ENCOUNTER
Caller: Laure Valentin    Relationship to patient: Self    Best call back number: [phone]    Chief complaint: NEW MED START    Type of visit: OFFICE VISIT    Requested date: ASAP- SOONER THAN 07.14.23    If rescheduling, when is the original appointment: 07.14.23 AT 10:30 AM    Additional notes: PATIENT IS WANTING TO KNOW IF KAYLA CAN GET HER IN SOONER SINCE HE TOLD HER SHE HAS TO COME IN FOR THEM TO DISCUSS A NEW MEDICATION. IF NOT SHE WOULD LIKE TO KEEP THIS APPOINTMENT AS IS. PLEASE LET HER KNOW.

## 2023-06-01 ENCOUNTER — LAB (OUTPATIENT)
Dept: LAB | Facility: HOSPITAL | Age: 53
End: 2023-06-01
Payer: COMMERCIAL

## 2023-06-01 ENCOUNTER — OFFICE VISIT (OUTPATIENT)
Dept: FAMILY MEDICINE CLINIC | Facility: CLINIC | Age: 53
End: 2023-06-01

## 2023-06-01 VITALS
DIASTOLIC BLOOD PRESSURE: 68 MMHG | HEIGHT: 62 IN | WEIGHT: 197.2 LBS | BODY MASS INDEX: 36.29 KG/M2 | SYSTOLIC BLOOD PRESSURE: 100 MMHG

## 2023-06-01 DIAGNOSIS — E87.5 HYPERKALEMIA: ICD-10-CM

## 2023-06-01 DIAGNOSIS — E66.01 CLASS 2 SEVERE OBESITY DUE TO EXCESS CALORIES WITH SERIOUS COMORBIDITY AND BODY MASS INDEX (BMI) OF 36.0 TO 36.9 IN ADULT: Primary | ICD-10-CM

## 2023-06-01 LAB
ANION GAP SERPL CALCULATED.3IONS-SCNC: 8.4 MMOL/L (ref 5–15)
BUN SERPL-MCNC: 12 MG/DL (ref 6–20)
BUN/CREAT SERPL: 17.6 (ref 7–25)
CALCIUM SPEC-SCNC: 10.4 MG/DL (ref 8.6–10.5)
CHLORIDE SERPL-SCNC: 104 MMOL/L (ref 98–107)
CO2 SERPL-SCNC: 29.6 MMOL/L (ref 22–29)
CREAT SERPL-MCNC: 0.68 MG/DL (ref 0.57–1)
EGFRCR SERPLBLD CKD-EPI 2021: 105.6 ML/MIN/1.73
GLUCOSE SERPL-MCNC: 95 MG/DL (ref 65–99)
POTASSIUM SERPL-SCNC: 4.7 MMOL/L (ref 3.5–5.2)
SODIUM SERPL-SCNC: 142 MMOL/L (ref 136–145)

## 2023-06-01 PROCEDURE — 99214 OFFICE O/P EST MOD 30 MIN: CPT | Performed by: FAMILY MEDICINE

## 2023-06-01 PROCEDURE — 80048 BASIC METABOLIC PNL TOTAL CA: CPT

## 2023-06-01 RX ORDER — SEMAGLUTIDE 0.5 MG/.5ML
0.5 INJECTION, SOLUTION SUBCUTANEOUS WEEKLY
Qty: 2 ML | Refills: 0 | Status: SHIPPED | OUTPATIENT
Start: 2023-06-01 | End: 2023-06-23

## 2023-06-01 RX ORDER — SEMAGLUTIDE 0.25 MG/.5ML
0.25 INJECTION, SOLUTION SUBCUTANEOUS WEEKLY
Qty: 2 ML | Refills: 0 | Status: SHIPPED | OUTPATIENT
Start: 2023-06-01 | End: 2023-06-23
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
## 2023-06-01 NOTE — ASSESSMENT & PLAN NOTE
Patient has a history of obesity with a BMI of Body mass index is 36.07 kg/m². she has been unsuccessful with a sustained trial of reduced calorie diet and increased physical activity, and has a clear indication for pharmacological intervention for weight management. she qualifies for Wegovy with a BMI 36.07.  · she has no family history of medullary thyroid carcinoma or multiple endocrine neoplasia.  · she is not at increased risk of pancreatitis or pancreatitis-associated complications.  · she has been counseled on the risks and benefits of treatment with Wegovy. she is an appropriate candidate for injectable medication for weight management.  · she will continue dosing at 0.25mg SC for 4 weeks; 0.5mg SC for 4 weeks; 1.0mg SC for 4 weeks; 1.7mg SC for 4 weeks; 2.4mg SC for 4 weeks.  · she will be seen in follow-up in 4 weeks to ensure tolerance of this medication.  · Thereafter, we will follow-up every 4 weeks for weight check, dietary diary review, and regular monitoring. We will monitor for tachycardia as well as depression and suicidal ideation.  If significant symptoms develop, we will plan to discontinue the medication.  · If the patient is unable to lose at least 4% of total body weight at 16 weeks, we will consider discontinuing the medication.

## 2023-06-02 ENCOUNTER — PRIOR AUTHORIZATION (OUTPATIENT)
Dept: FAMILY MEDICINE CLINIC | Facility: CLINIC | Age: 53
End: 2023-06-02

## 2023-08-01 ENCOUNTER — OFFICE VISIT (OUTPATIENT)
Dept: FAMILY MEDICINE CLINIC | Facility: CLINIC | Age: 53
End: 2023-08-01
Payer: COMMERCIAL

## 2023-08-01 VITALS
BODY MASS INDEX: 33.68 KG/M2 | HEIGHT: 62 IN | SYSTOLIC BLOOD PRESSURE: 122 MMHG | HEART RATE: 90 BPM | WEIGHT: 183 LBS | OXYGEN SATURATION: 99 % | DIASTOLIC BLOOD PRESSURE: 86 MMHG

## 2023-08-01 DIAGNOSIS — E66.09 CLASS 1 OBESITY DUE TO EXCESS CALORIES WITH SERIOUS COMORBIDITY AND BODY MASS INDEX (BMI) OF 34.0 TO 34.9 IN ADULT: Primary | ICD-10-CM

## 2023-08-01 PROCEDURE — 99213 OFFICE O/P EST LOW 20 MIN: CPT | Performed by: PHYSICIAN ASSISTANT

## 2023-08-01 RX ORDER — SEMAGLUTIDE 2.4 MG/.75ML
2.4 INJECTION, SOLUTION SUBCUTANEOUS
Qty: 3 ML | Refills: 2 | Status: SHIPPED | OUTPATIENT
Start: 2023-08-01

## 2023-09-15 ENCOUNTER — OFFICE VISIT (OUTPATIENT)
Dept: FAMILY MEDICINE CLINIC | Facility: CLINIC | Age: 53
End: 2023-09-15
Payer: COMMERCIAL

## 2023-09-15 VITALS
BODY MASS INDEX: 31.28 KG/M2 | DIASTOLIC BLOOD PRESSURE: 76 MMHG | HEART RATE: 80 BPM | WEIGHT: 170 LBS | SYSTOLIC BLOOD PRESSURE: 116 MMHG | HEIGHT: 62 IN | OXYGEN SATURATION: 96 %

## 2023-09-15 DIAGNOSIS — Z97.5 IUD (INTRAUTERINE DEVICE) IN PLACE: ICD-10-CM

## 2023-09-15 DIAGNOSIS — E66.09 CLASS 1 OBESITY DUE TO EXCESS CALORIES WITH SERIOUS COMORBIDITY AND BODY MASS INDEX (BMI) OF 31.0 TO 31.9 IN ADULT: ICD-10-CM

## 2023-09-15 DIAGNOSIS — Z12.31 ENCOUNTER FOR SCREENING MAMMOGRAM FOR MALIGNANT NEOPLASM OF BREAST: ICD-10-CM

## 2023-09-15 DIAGNOSIS — Z00.00 PREVENTATIVE HEALTH CARE: Primary | ICD-10-CM

## 2023-09-15 PROCEDURE — 99396 PREV VISIT EST AGE 40-64: CPT | Performed by: PHYSICIAN ASSISTANT

## 2023-09-15 RX ORDER — SEMAGLUTIDE 2.4 MG/.75ML
2.4 INJECTION, SOLUTION SUBCUTANEOUS
Qty: 3 ML | Refills: 2 | Status: SHIPPED | OUTPATIENT
Start: 2023-09-15
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
## 2023-09-15 NOTE — ASSESSMENT & PLAN NOTE
-Counseled patient regarding cancer screening, immunizations, healthy lifestyle, diet, maintaining a healthy weight, and exercise.  -Annual dental, eye, and gynecologic exams were recommended.    -Recommended patient schedule follow-up with GYN for IUD removal.   -Check screening labs as ordered.   -RTC in 3 months for f/u of obesity/Wegovy.

## 2023-09-15 NOTE — ASSESSMENT & PLAN NOTE
Patient's (Body mass index is 31.09 kg/m².) indicates that they are obese (BMI >30) with health conditions that include dyslipidemias . Weight is improving with treatment. BMI  is above average; BMI management plan is completed. We discussed low calorie, low carb based diet program, portion control, increasing exercise, and pharmacologic options including Wegovy .

## 2023-10-20 ENCOUNTER — HOSPITAL ENCOUNTER (OUTPATIENT)
Dept: MAMMOGRAPHY | Facility: HOSPITAL | Age: 53
Discharge: HOME | End: 2023-10-20
Admitting: PHYSICIAN ASSISTANT
Payer: COMMERCIAL

## 2023-10-20 DIAGNOSIS — Z12.31 ENCOUNTER FOR SCREENING MAMMOGRAM FOR MALIGNANT NEOPLASM OF BREAST: ICD-10-CM

## 2023-10-20 PROCEDURE — 77063 BREAST TOMOSYNTHESIS BI: CPT

## 2023-10-20 PROCEDURE — 77067 SCR MAMMO BI INCL CAD: CPT

## 2023-12-05 ENCOUNTER — LAB (OUTPATIENT)
Dept: LAB | Facility: HOSPITAL | Age: 53
End: 2023-12-05
Payer: COMMERCIAL

## 2023-12-05 ENCOUNTER — HOSPITAL ENCOUNTER (OUTPATIENT)
Dept: MAMMOGRAPHY | Facility: HOSPITAL | Age: 53
Discharge: HOME | End: 2023-12-05
Admitting: RADIOLOGY
Payer: COMMERCIAL

## 2023-12-05 ENCOUNTER — HOSPITAL ENCOUNTER (OUTPATIENT)
Dept: ULTRASOUND IMAGING | Facility: HOSPITAL | Age: 53
Discharge: HOME | End: 2023-12-05
Payer: COMMERCIAL

## 2023-12-05 DIAGNOSIS — R92.8 ABNORMAL MAMMOGRAM: ICD-10-CM

## 2023-12-05 DIAGNOSIS — Z00.00 PREVENTATIVE HEALTH CARE: ICD-10-CM

## 2023-12-05 PROCEDURE — 80061 LIPID PANEL: CPT

## 2023-12-05 PROCEDURE — 84466 ASSAY OF TRANSFERRIN: CPT

## 2023-12-05 PROCEDURE — 82306 VITAMIN D 25 HYDROXY: CPT

## 2023-12-05 PROCEDURE — 77065 DX MAMMO INCL CAD UNI: CPT

## 2023-12-05 PROCEDURE — 80050 GENERAL HEALTH PANEL: CPT

## 2023-12-05 PROCEDURE — 77061 BREAST TOMOSYNTHESIS UNI: CPT | Performed by: RADIOLOGY

## 2023-12-05 PROCEDURE — 83036 HEMOGLOBIN GLYCOSYLATED A1C: CPT

## 2023-12-05 PROCEDURE — 76642 ULTRASOUND BREAST LIMITED: CPT

## 2023-12-05 PROCEDURE — 76642 ULTRASOUND BREAST LIMITED: CPT | Performed by: RADIOLOGY

## 2023-12-05 PROCEDURE — G0279 TOMOSYNTHESIS, MAMMO: HCPCS

## 2023-12-05 PROCEDURE — 77065 DX MAMMO INCL CAD UNI: CPT | Performed by: RADIOLOGY

## 2023-12-05 PROCEDURE — 82607 VITAMIN B-12: CPT

## 2023-12-05 PROCEDURE — 83540 ASSAY OF IRON: CPT

## 2023-12-06 LAB
25(OH)D3 SERPL-MCNC: 46.2 NG/ML (ref 30–100)
ALBUMIN SERPL-MCNC: 4.6 G/DL (ref 3.5–5.2)
ALBUMIN/GLOB SERPL: 1.8 G/DL
ALP SERPL-CCNC: 53 U/L (ref 39–117)
ALT SERPL W P-5'-P-CCNC: 12 U/L (ref 1–33)
ANION GAP SERPL CALCULATED.3IONS-SCNC: 12 MMOL/L (ref 5–15)
AST SERPL-CCNC: 21 U/L (ref 1–32)
BILIRUB SERPL-MCNC: 0.6 MG/DL (ref 0–1.2)
BUN SERPL-MCNC: 14 MG/DL (ref 6–20)
BUN/CREAT SERPL: 21.5 (ref 7–25)
CALCIUM SPEC-SCNC: 9.7 MG/DL (ref 8.6–10.5)
CHLORIDE SERPL-SCNC: 102 MMOL/L (ref 98–107)
CHOLEST SERPL-MCNC: 165 MG/DL (ref 0–200)
CO2 SERPL-SCNC: 24 MMOL/L (ref 22–29)
CREAT SERPL-MCNC: 0.65 MG/DL (ref 0.57–1)
DEPRECATED RDW RBC AUTO: 39.5 FL (ref 37–54)
EGFRCR SERPLBLD CKD-EPI 2021: 106.7 ML/MIN/1.73
ERYTHROCYTE [DISTWIDTH] IN BLOOD BY AUTOMATED COUNT: 13 % (ref 12.3–15.4)
GLOBULIN UR ELPH-MCNC: 2.6 GM/DL
GLUCOSE SERPL-MCNC: 72 MG/DL (ref 65–99)
HBA1C MFR BLD: 5.2 % (ref 4.8–5.6)
HCT VFR BLD AUTO: 36.8 % (ref 34–46.6)
HDLC SERPL-MCNC: 53 MG/DL (ref 40–60)
HGB BLD-MCNC: 12.1 G/DL (ref 12–15.9)
IRON 24H UR-MRATE: 120 MCG/DL (ref 37–145)
IRON SATN MFR SERPL: 31 % (ref 20–50)
LDLC SERPL CALC-MCNC: 99 MG/DL (ref 0–100)
LDLC/HDLC SERPL: 1.86 {RATIO}
MCH RBC QN AUTO: 28.1 PG (ref 26.6–33)
MCHC RBC AUTO-ENTMCNC: 32.9 G/DL (ref 31.5–35.7)
MCV RBC AUTO: 85.6 FL (ref 79–97)
PLATELET # BLD AUTO: 307 10*3/MM3 (ref 140–450)
PMV BLD AUTO: 11 FL (ref 6–12)
POTASSIUM SERPL-SCNC: 4.4 MMOL/L (ref 3.5–5.2)
PROT SERPL-MCNC: 7.2 G/DL (ref 6–8.5)
RBC # BLD AUTO: 4.3 10*6/MM3 (ref 3.77–5.28)
SODIUM SERPL-SCNC: 138 MMOL/L (ref 136–145)
TIBC SERPL-MCNC: 390 MCG/DL (ref 298–536)
TRANSFERRIN SERPL-MCNC: 262 MG/DL (ref 200–360)
TRIGL SERPL-MCNC: 67 MG/DL (ref 0–150)
TSH SERPL DL<=0.05 MIU/L-ACNC: 1.04 UIU/ML (ref 0.27–4.2)
VIT B12 BLD-MCNC: 884 PG/ML (ref 211–946)
VLDLC SERPL-MCNC: 13 MG/DL (ref 5–40)
WBC NRBC COR # BLD AUTO: 4.38 10*3/MM3 (ref 3.4–10.8)

## 2023-12-18 ENCOUNTER — OFFICE VISIT (OUTPATIENT)
Dept: FAMILY MEDICINE CLINIC | Facility: CLINIC | Age: 53
End: 2023-12-18
Payer: COMMERCIAL

## 2023-12-18 VITALS
OXYGEN SATURATION: 100 % | BODY MASS INDEX: 31.28 KG/M2 | HEIGHT: 62 IN | WEIGHT: 170 LBS | DIASTOLIC BLOOD PRESSURE: 54 MMHG | SYSTOLIC BLOOD PRESSURE: 104 MMHG | HEART RATE: 67 BPM

## 2023-12-18 DIAGNOSIS — H66.92 LEFT OTITIS MEDIA, UNSPECIFIED OTITIS MEDIA TYPE: ICD-10-CM

## 2023-12-18 DIAGNOSIS — K59.00 CONSTIPATION, UNSPECIFIED CONSTIPATION TYPE: ICD-10-CM

## 2023-12-18 DIAGNOSIS — E66.3 OVERWEIGHT (BMI 25.0-29.9): Primary | ICD-10-CM

## 2023-12-18 RX ORDER — SEMAGLUTIDE 2.4 MG/.75ML
2.4 INJECTION, SOLUTION SUBCUTANEOUS
Qty: 3 ML | Refills: 2 | Status: SHIPPED | OUTPATIENT
Start: 2023-12-18

## 2023-12-18 RX ORDER — AMOXICILLIN AND CLAVULANATE POTASSIUM 875; 125 MG/1; MG/1
1 TABLET, FILM COATED ORAL 2 TIMES DAILY
Qty: 14 TABLET | Refills: 0 | Status: SHIPPED | OUTPATIENT
Start: 2023-12-18 | End: 2023-12-25

## 2023-12-18 RX ORDER — BROMPHENIRAMINE MALEATE, PSEUDOEPHEDRINE HYDROCHLORIDE, AND DEXTROMETHORPHAN HYDROBROMIDE 2; 30; 10 MG/5ML; MG/5ML; MG/5ML
5 SYRUP ORAL 4 TIMES DAILY PRN
Qty: 118 ML | Refills: 0 | Status: SHIPPED | OUTPATIENT
Start: 2023-12-18
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
## 2023-12-19 NOTE — ASSESSMENT & PLAN NOTE
Progress on Wegovy. She would like to continue this medication.   Encouraged healthy diet/exercise.   Discussed regular use of miralax or stool softners for constipation.   RTC 3 months.

## 2024-03-07 RX ORDER — SEMAGLUTIDE 2.4 MG/.75ML
2.4 INJECTION, SOLUTION SUBCUTANEOUS
Qty: 3 ML | Refills: 2 | Status: SHIPPED | OUTPATIENT
Start: 2024-03-07

## 2024-03-15 ENCOUNTER — PATIENT MESSAGE (OUTPATIENT)
Dept: PAIN MEDICINE | Facility: CLINIC | Age: 54
End: 2024-03-15
Payer: COMMERCIAL

## 2024-03-16 ENCOUNTER — PATIENT MESSAGE (OUTPATIENT)
Dept: FAMILY MEDICINE CLINIC | Facility: CLINIC | Age: 54
End: 2024-03-16
Payer: COMMERCIAL

## 2024-03-18 ENCOUNTER — OFFICE VISIT (OUTPATIENT)
Dept: FAMILY MEDICINE CLINIC | Facility: CLINIC | Age: 54
End: 2024-03-18
Payer: COMMERCIAL

## 2024-03-18 VITALS
HEIGHT: 62 IN | OXYGEN SATURATION: 99 % | DIASTOLIC BLOOD PRESSURE: 76 MMHG | SYSTOLIC BLOOD PRESSURE: 110 MMHG | WEIGHT: 147 LBS | HEART RATE: 70 BPM | BODY MASS INDEX: 27.05 KG/M2

## 2024-03-18 DIAGNOSIS — E66.3 OVERWEIGHT (BMI 25.0-29.9): Primary | ICD-10-CM
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
## 2024-03-18 NOTE — ASSESSMENT & PLAN NOTE
Doing well on Wegovy. Will continue 2.4 mg weekly until her next visit when he may consider reducing her dose.   Continue exercise on 5 days weekly, low carbohydrate diet.   RTC in 3 months, sooner prn.

## 2024-04-02 ENCOUNTER — OFFICE VISIT (OUTPATIENT)
Dept: OBSTETRICS AND GYNECOLOGY | Facility: CLINIC | Age: 54
End: 2024-04-02
Payer: COMMERCIAL

## 2024-04-02 VITALS
RESPIRATION RATE: 14 BRPM | BODY MASS INDEX: 25.97 KG/M2 | DIASTOLIC BLOOD PRESSURE: 68 MMHG | WEIGHT: 142 LBS | SYSTOLIC BLOOD PRESSURE: 112 MMHG

## 2024-04-02 DIAGNOSIS — Z30.432 ENCOUNTER FOR REMOVAL OF INTRAUTERINE CONTRACEPTIVE DEVICE: ICD-10-CM

## 2024-04-02 DIAGNOSIS — Z01.419 WELL WOMAN EXAM WITH ROUTINE GYNECOLOGICAL EXAM: Primary | ICD-10-CM

## 2024-04-02 DIAGNOSIS — Z71.85 VACCINE COUNSELING: ICD-10-CM

## 2024-04-02 PROBLEM — Z97.5 IUD (INTRAUTERINE DEVICE) IN PLACE: Status: RESOLVED | Noted: 2019-06-26 | Resolved: 2024-04-02

## 2024-04-02 PROCEDURE — 58301 REMOVE INTRAUTERINE DEVICE: CPT | Performed by: OBSTETRICS & GYNECOLOGY

## 2024-04-02 PROCEDURE — 99396 PREV VISIT EST AGE 40-64: CPT | Performed by: OBSTETRICS & GYNECOLOGY

## 2024-04-02 NOTE — PROGRESS NOTES
IUD Removal    Date of procedure:  4/2/2024    Risks and benefits discussed? yes  All questions answered? yes  Consents given by The patient  Written consent obtained? yes  Reason for removal: Menopause    Local anesthesia used:  no    Procedure documentation:    A speculum was placed in order to view the cervix.  A tenaculum did not need to be placed on the anterior cervical lip.  Cervical dilation did not need to be performed in order to access the string.  The IUD string was easily seen.  The string was grasped and the IUD was removed without difficulty.  The IUD did not appear to be adherent to the uterine cavity. It was removed intact.    She tolerated the procedure without any difficulty.     Post procedure instructions: Patient notified to call with heavy bleeding, fever or increasing pain.    Follow up PRN      This note was electronically signed.    Maged Marcelo M.D.  April 2, 2024
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
## 2024-04-02 NOTE — PATIENT INSTRUCTIONS
Zoster Vaccine, Recombinant injection (Shingrix)      What is this medicine?  ZOSTER VACCINE (ZOS ter vak SEEN) is used to prevent shingles in adults 50 years old and over. This vaccine is not used to treat shingles or nerve pain from shingles.  This medicine may be used for other purposes; ask your health care provider or pharmacist if you have questions.    What should I tell my health care provider before I take this medicine?  They need to know if you have any of these conditions:  blood disorders or disease  cancer like leukemia or lymphoma  immune system problems or therapy  an unusual or allergic reaction to vaccines, other medications, foods, dyes, or preservatives  pregnant or trying to get pregnant  breast-feeding    How should I use this medicine?  This vaccine is for injection in a muscle. It is given by a health care professional.  The vaccine series requires 2 doses for full effect  The second dose should be given somewhere between 2-6 months after the initial injection is given.    What if I miss a dose?  Keep appointments for follow-up (booster) doses as directed. It is important not to miss your dose.   Call your doctor or health care professional if you are unable to keep an appointment.    What may interact with this medicine?  medicines that suppress your immune system  medicines to treat cancer  steroid medicines like prednisone or cortisone    This list may not describe all possible interactions. Give your health care provider a list of all the medicines, herbs, non-prescription drugs, or dietary supplements you use. Also tell them if you smoke, drink alcohol, or use illegal drugs. Some items may interact with your medicine.    What should I watch for while using this medicine?  Visit your doctor for regular check ups.  This vaccine, like all vaccines, may not fully protect everyone.    What side effects may I notice from receiving this medicine?  Side effects that you should report to your  doctor or health care professional as soon as possible:  allergic reactions like skin rash, itching or hives, swelling of the face, lips, or tongue  breathing problems  Side effects that usually do not require medical attention (report these to your doctor or health care professional if they continue or are bothersome):  chills  headache  fever  nausea, vomiting  redness, warmth, pain, swelling or itching at site where injected  tiredness  This list may not describe all possible side effects. Call your doctor for medical advice about side effects. You may report side effects to FDA at 7-886-FDA-3509.    Where should I keep my medicine?  This vaccine is only given in a clinic, pharmacy, doctor's office, or other health care setting and will not be stored at home.  NOTE: This sheet is a summary. It may not cover all possible information. If you have questions about this medicine, talk to your doctor, pharmacist, or health care provider.  © 2019 [employer] (2018-07-30 13:20:30)

## 2024-04-09 LAB — REF LAB TEST METHOD: NORMAL

## 2024-05-30 ENCOUNTER — PATIENT MESSAGE (OUTPATIENT)
Dept: FAMILY MEDICINE CLINIC | Facility: CLINIC | Age: 54
End: 2024-05-30
Payer: COMMERCIAL

## 2024-05-30 RX ORDER — SEMAGLUTIDE 2.4 MG/.75ML
2.4 INJECTION, SOLUTION SUBCUTANEOUS
Qty: 3 ML | Refills: 0 | Status: SHIPPED | OUTPATIENT
Start: 2024-05-30

## 2024-06-18 ENCOUNTER — OFFICE VISIT (OUTPATIENT)
Dept: FAMILY MEDICINE CLINIC | Facility: CLINIC | Age: 54
End: 2024-06-18
Payer: COMMERCIAL

## 2024-06-18 VITALS
HEIGHT: 62 IN | SYSTOLIC BLOOD PRESSURE: 106 MMHG | BODY MASS INDEX: 24.88 KG/M2 | WEIGHT: 135.2 LBS | OXYGEN SATURATION: 100 % | DIASTOLIC BLOOD PRESSURE: 68 MMHG

## 2024-06-18 DIAGNOSIS — Z86.39 HISTORY OF OBESITY: Primary | ICD-10-CM

## 2024-06-18 PROBLEM — E66.3 OVERWEIGHT (BMI 25.0-29.9): Status: RESOLVED | Noted: 2019-08-09 | Resolved: 2024-06-18

## 2024-06-18 PROCEDURE — 99213 OFFICE O/P EST LOW 20 MIN: CPT | Performed by: PHYSICIAN ASSISTANT

## 2024-06-18 RX ORDER — SEMAGLUTIDE 2.4 MG/.75ML
2.4 INJECTION, SOLUTION SUBCUTANEOUS
Qty: 3 ML | Refills: 2 | Status: SHIPPED | OUTPATIENT
Start: 2024-06-18
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
## 2024-06-25 ENCOUNTER — PATIENT MESSAGE (OUTPATIENT)
Dept: FAMILY MEDICINE CLINIC | Facility: CLINIC | Age: 54
End: 2024-06-25
Payer: COMMERCIAL

## 2024-06-26 ENCOUNTER — PRIOR AUTHORIZATION (OUTPATIENT)
Dept: FAMILY MEDICINE CLINIC | Facility: CLINIC | Age: 54
End: 2024-06-26
Payer: COMMERCIAL

## 2024-07-10 ENCOUNTER — OFFICE VISIT (OUTPATIENT)
Dept: PAIN MEDICINE | Facility: CLINIC | Age: 54
End: 2024-07-10
Payer: COMMERCIAL

## 2024-07-10 VITALS — WEIGHT: 131.9 LBS | HEIGHT: 62 IN | BODY MASS INDEX: 24.27 KG/M2

## 2024-07-10 DIAGNOSIS — M50.30 DDD (DEGENERATIVE DISC DISEASE), CERVICAL: ICD-10-CM

## 2024-07-10 DIAGNOSIS — M50.20 CERVICAL DISCOGENIC PAIN SYNDROME: ICD-10-CM

## 2024-07-10 DIAGNOSIS — M47.812 CERVICAL SPONDYLOSIS WITHOUT MYELOPATHY: ICD-10-CM

## 2024-07-10 PROCEDURE — 99213 OFFICE O/P EST LOW 20 MIN: CPT | Performed by: NURSE PRACTITIONER
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
## 2024-09-06 ENCOUNTER — TRANSCRIBE ORDERS (OUTPATIENT)
Dept: ADMINISTRATIVE | Facility: HOSPITAL | Age: 54
End: 2024-09-06
Payer: COMMERCIAL

## 2024-09-06 DIAGNOSIS — Z12.31 VISIT FOR SCREENING MAMMOGRAM: Primary | ICD-10-CM

## 2024-10-11 ENCOUNTER — OFFICE VISIT (OUTPATIENT)
Dept: FAMILY MEDICINE CLINIC | Facility: CLINIC | Age: 54
End: 2024-10-11
Payer: COMMERCIAL

## 2024-10-11 VITALS
BODY MASS INDEX: 24.88 KG/M2 | OXYGEN SATURATION: 99 % | SYSTOLIC BLOOD PRESSURE: 118 MMHG | WEIGHT: 135.2 LBS | DIASTOLIC BLOOD PRESSURE: 58 MMHG | HEIGHT: 62 IN | HEART RATE: 77 BPM

## 2024-10-11 DIAGNOSIS — Z90.3 H/O GASTRIC SLEEVE: ICD-10-CM

## 2024-10-11 DIAGNOSIS — Z00.00 HEALTHCARE MAINTENANCE: Primary | ICD-10-CM

## 2024-10-11 DIAGNOSIS — Z00.00 HEALTHCARE MAINTENANCE: ICD-10-CM

## 2024-10-11 LAB
BILIRUB UR QL STRIP: NEGATIVE
CLARITY UR: CLEAR
COLOR UR: YELLOW
GLUCOSE UR STRIP-MCNC: NEGATIVE MG/DL
HGB UR QL STRIP.AUTO: NEGATIVE
HOLD SPECIMEN: NORMAL
KETONES UR QL STRIP: NEGATIVE
LEUKOCYTE ESTERASE UR QL STRIP.AUTO: NEGATIVE
NITRITE UR QL STRIP: NEGATIVE
PH UR STRIP.AUTO: 6 [PH] (ref 5–8)
PROT UR QL STRIP: NEGATIVE
SP GR UR STRIP: >1.03 (ref 1–1.03)
UROBILINOGEN UR QL STRIP: ABNORMAL

## 2024-10-11 PROCEDURE — 81003 URINALYSIS AUTO W/O SCOPE: CPT | Performed by: PHYSICIAN ASSISTANT

## 2024-10-11 PROCEDURE — 99396 PREV VISIT EST AGE 40-64: CPT | Performed by: PHYSICIAN ASSISTANT
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
## 2024-10-13 LAB
NCCN CRITERIA FLAG: NORMAL
TYRER CUZICK SCORE: 7.1

## 2024-10-28 ENCOUNTER — HOSPITAL ENCOUNTER (OUTPATIENT)
Dept: MAMMOGRAPHY | Facility: HOSPITAL | Age: 54
Discharge: HOME | End: 2024-10-28
Admitting: PHYSICIAN ASSISTANT
Payer: COMMERCIAL

## 2024-10-28 DIAGNOSIS — Z12.31 VISIT FOR SCREENING MAMMOGRAM: ICD-10-CM

## 2024-10-28 PROCEDURE — 77067 SCR MAMMO BI INCL CAD: CPT

## 2024-10-28 PROCEDURE — 77063 BREAST TOMOSYNTHESIS BI: CPT

## 2024-12-31 LAB
1,25(OH)2D SERPL-MCNC: 51.9 PG/ML (ref 24.8–81.5)
25(OH)D3+25(OH)D2 SERPL-MCNC: 27.9 NG/ML (ref 30–100)
ALBUMIN SERPL-MCNC: 3.8 G/DL (ref 3.5–5.2)
ALBUMIN/GLOB SERPL: 1.4 G/DL
ALP SERPL-CCNC: 55 U/L (ref 39–117)
ALT SERPL-CCNC: 43 U/L (ref 1–33)
AST SERPL-CCNC: 34 U/L (ref 1–32)
BILIRUB SERPL-MCNC: 0.5 MG/DL (ref 0–1.2)
BUN SERPL-MCNC: 10 MG/DL (ref 6–20)
BUN/CREAT SERPL: 16.4 (ref 7–25)
CALCIUM SERPL-MCNC: 9.2 MG/DL (ref 8.6–10.5)
CHLORIDE SERPL-SCNC: 104 MMOL/L (ref 98–107)
CHOLEST SERPL-MCNC: 178 MG/DL (ref 0–200)
CO2 SERPL-SCNC: 29.3 MMOL/L (ref 22–29)
CREAT SERPL-MCNC: 0.61 MG/DL (ref 0.57–1)
EGFRCR SERPLBLD CKD-EPI 2021: 107.1 ML/MIN/1.73
ERYTHROCYTE [DISTWIDTH] IN BLOOD BY AUTOMATED COUNT: 12.8 % (ref 12.3–15.4)
FERRITIN SERPL-MCNC: 32.6 NG/ML (ref 13–150)
FOLATE SERPL-MCNC: 3.88 NG/ML (ref 4.78–24.2)
GLOBULIN SER CALC-MCNC: 2.8 GM/DL
GLUCOSE SERPL-MCNC: 83 MG/DL (ref 65–99)
HBA1C MFR BLD: 5.1 % (ref 4.8–5.6)
HCT VFR BLD AUTO: 37.6 % (ref 34–46.6)
HDLC SERPL-MCNC: 63 MG/DL (ref 40–60)
HGB BLD-MCNC: 12 G/DL (ref 12–15.9)
LDLC SERPL CALC-MCNC: 101 MG/DL (ref 0–100)
MCH RBC QN AUTO: 28.2 PG (ref 26.6–33)
MCHC RBC AUTO-ENTMCNC: 31.9 G/DL (ref 31.5–35.7)
MCV RBC AUTO: 88.5 FL (ref 79–97)
PLATELET # BLD AUTO: 276 10*3/MM3 (ref 140–450)
POTASSIUM SERPL-SCNC: 4.1 MMOL/L (ref 3.5–5.2)
PROT SERPL-MCNC: 6.6 G/DL (ref 6–8.5)
RBC # BLD AUTO: 4.25 10*6/MM3 (ref 3.77–5.28)
SODIUM SERPL-SCNC: 141 MMOL/L (ref 136–145)
TRIGL SERPL-MCNC: 73 MG/DL (ref 0–150)
TSH SERPL DL<=0.005 MIU/L-ACNC: 0.66 UIU/ML (ref 0.27–4.2)
VIT B12 SERPL-MCNC: 822 PG/ML (ref 211–946)
VLDLC SERPL CALC-MCNC: 14 MG/DL (ref 5–40)
WBC # BLD AUTO: 2.93 10*3/MM3 (ref 3.4–10.8)

## 2025-01-03 DIAGNOSIS — R74.8 ELEVATED LIVER ENZYMES: Primary | ICD-10-CM

## 2025-01-03 PROBLEM — E53.8 FOLATE DEFICIENCY: Status: ACTIVE | Noted: 2025-01-03

## 2025-01-03 RX ORDER — FOLIC ACID 1 MG/1
1 TABLET ORAL DAILY
Qty: 90 TABLET | Refills: 3 | Status: SHIPPED | OUTPATIENT
Start: 2025-01-03

## 2025-01-10 ENCOUNTER — OFFICE VISIT (OUTPATIENT)
Dept: FAMILY MEDICINE CLINIC | Facility: CLINIC | Age: 55
End: 2025-01-10
Payer: COMMERCIAL

## 2025-01-10 VITALS
HEIGHT: 62 IN | SYSTOLIC BLOOD PRESSURE: 102 MMHG | HEART RATE: 67 BPM | OXYGEN SATURATION: 95 % | WEIGHT: 134 LBS | DIASTOLIC BLOOD PRESSURE: 64 MMHG | BODY MASS INDEX: 24.66 KG/M2

## 2025-01-10 DIAGNOSIS — E53.8 FOLIC ACID DEFICIENCY: ICD-10-CM

## 2025-01-10 DIAGNOSIS — D72.819 LEUKOPENIA, UNSPECIFIED TYPE: ICD-10-CM

## 2025-01-10 DIAGNOSIS — Z86.39 HISTORY OF OBESITY: Primary | ICD-10-CM

## 2025-01-10 DIAGNOSIS — R74.8 ELEVATED LIVER ENZYMES: ICD-10-CM

## 2025-01-10 PROCEDURE — 99214 OFFICE O/P EST MOD 30 MIN: CPT | Performed by: PHYSICIAN ASSISTANT
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
## 2025-01-22 ENCOUNTER — HOSPITAL ENCOUNTER (OUTPATIENT)
Facility: HOSPITAL | Age: 55
Discharge: HOME | End: 2025-01-22
Admitting: PHYSICIAN ASSISTANT
Payer: COMMERCIAL

## 2025-01-22 DIAGNOSIS — R74.8 ELEVATED LIVER ENZYMES: ICD-10-CM

## 2025-01-22 PROCEDURE — 76705 ECHO EXAM OF ABDOMEN: CPT

## 2025-03-07 LAB
ANA SER QL: NEGATIVE
BASOPHILS # BLD AUTO: 0.1 X10E3/UL (ref 0–0.2)
BASOPHILS NFR BLD AUTO: 1 %
EOSINOPHIL # BLD AUTO: 0.1 X10E3/UL (ref 0–0.4)
EOSINOPHIL NFR BLD AUTO: 2 %
ERYTHROCYTE [DISTWIDTH] IN BLOOD BY AUTOMATED COUNT: 12.7 % (ref 11.7–15.4)
FOLATE SERPL-MCNC: >20 NG/ML
HBV CORE AB SERPL QL IA: NEGATIVE
HBV SURFACE AB SER-ACNC: 8.2 MIU/ML
HBV SURFACE AG SERPL QL IA: NEGATIVE
HCT VFR BLD AUTO: 37.1 % (ref 34–46.6)
HCV IGG SERPL QL IA: NON REACTIVE
HGB BLD-MCNC: 11.5 G/DL (ref 11.1–15.9)
IMM GRANULOCYTES # BLD AUTO: 0 X10E3/UL (ref 0–0.1)
IMM GRANULOCYTES NFR BLD AUTO: 0 %
LKM-1 AB SER-ACNC: 1.3 UNITS (ref 0–20)
LYMPHOCYTES # BLD AUTO: 1.8 X10E3/UL (ref 0.7–3.1)
LYMPHOCYTES NFR BLD AUTO: 47 %
MCH RBC QN AUTO: 27.5 PG (ref 26.6–33)
MCHC RBC AUTO-ENTMCNC: 31 G/DL (ref 31.5–35.7)
MCV RBC AUTO: 89 FL (ref 79–97)
MONOCYTES # BLD AUTO: 0.3 X10E3/UL (ref 0.1–0.9)
MONOCYTES NFR BLD AUTO: 7 %
NEUTROPHILS # BLD AUTO: 1.6 X10E3/UL (ref 1.4–7)
NEUTROPHILS NFR BLD AUTO: 43 %
PLATELET # BLD AUTO: 292 X10E3/UL (ref 150–450)
RBC # BLD AUTO: 4.18 X10E6/UL (ref 3.77–5.28)
SMA IGG SER-ACNC: 14 UNITS (ref 0–19)
TTG IGG SER-ACNC: 5 U/ML (ref 0–5)
WBC # BLD AUTO: 3.8 X10E3/UL (ref 3.4–10.8)

## 2025-03-24 LAB
Lab: NORMAL
Lab: NORMAL
VERBAL ADD-ON: NORMAL

## 2025-04-01 LAB
ALBUMIN SERPL-MCNC: 3.9 G/DL (ref 3.8–4.9)
ALP SERPL-CCNC: 51 IU/L (ref 44–121)
ALT SERPL-CCNC: 24 IU/L (ref 0–32)
AST SERPL-CCNC: 25 IU/L (ref 0–40)
BILIRUB SERPL-MCNC: <0.2 MG/DL (ref 0–1.2)
BUN SERPL-MCNC: 15 MG/DL (ref 6–24)
BUN/CREAT SERPL: 28 (ref 9–23)
CALCIUM SERPL-MCNC: 9.3 MG/DL (ref 8.7–10.2)
CHLORIDE SERPL-SCNC: 104 MMOL/L (ref 96–106)
CO2 SERPL-SCNC: 22 MMOL/L (ref 20–29)
CREAT SERPL-MCNC: 0.53 MG/DL (ref 0.57–1)
EGFRCR SERPLBLD CKD-EPI 2021: 111 ML/MIN/1.73
GLOBULIN SER CALC-MCNC: 2.4 G/DL (ref 1.5–4.5)
GLUCOSE SERPL-MCNC: 82 MG/DL (ref 70–99)
POTASSIUM SERPL-SCNC: 4.2 MMOL/L (ref 3.5–5.2)
PROT SERPL-MCNC: 6.3 G/DL (ref 6–8.5)
SODIUM SERPL-SCNC: 140 MMOL/L (ref 134–144)
WRITTEN AUTHORIZATION: NORMAL

## 2025-04-07 ENCOUNTER — OFFICE VISIT (OUTPATIENT)
Dept: OBSTETRICS AND GYNECOLOGY | Facility: CLINIC | Age: 55
End: 2025-04-07
Payer: COMMERCIAL

## 2025-04-07 VITALS
SYSTOLIC BLOOD PRESSURE: 110 MMHG | WEIGHT: 135 LBS | BODY MASS INDEX: 24.69 KG/M2 | DIASTOLIC BLOOD PRESSURE: 72 MMHG | RESPIRATION RATE: 14 BRPM

## 2025-04-07 DIAGNOSIS — Z01.419 WELL WOMAN EXAM WITH ROUTINE GYNECOLOGICAL EXAM: Primary | ICD-10-CM

## 2025-04-07 DIAGNOSIS — R87.810 ASCUS WITH POSITIVE HIGH RISK HPV CERVICAL: ICD-10-CM

## 2025-04-07 DIAGNOSIS — Z71.85 VACCINE COUNSELING: ICD-10-CM

## 2025-04-07 DIAGNOSIS — R87.610 ASCUS WITH POSITIVE HIGH RISK HPV CERVICAL: ICD-10-CM

## 2025-04-07 PROBLEM — E53.8 FOLATE DEFICIENCY: Status: RESOLVED | Noted: 2025-01-03 | Resolved: 2025-04-07

## 2025-04-07 PROBLEM — Z98.84 HISTORY OF BARIATRIC SURGERY: Status: RESOLVED | Noted: 2018-08-08 | Resolved: 2025-04-07

## 2025-04-07 PROCEDURE — 99396 PREV VISIT EST AGE 40-64: CPT | Performed by: OBSTETRICS & GYNECOLOGY

## 2025-04-07 NOTE — PATIENT INSTRUCTIONS
Zoster Vaccine, Recombinant injection (Shingrix)      What is this medicine?  ZOSTER VACCINE (ZOS ter vak SEEN) is used to prevent shingles in adults 50 years old and over. This vaccine is not used to treat shingles or nerve pain from shingles.  This medicine may be used for other purposes; ask your health care provider or pharmacist if you have questions.    What should I tell my health care provider before I take this medicine?  They need to know if you have any of these conditions:  blood disorders or disease  cancer like leukemia or lymphoma  immune system problems or therapy  an unusual or allergic reaction to vaccines, other medications, foods, dyes, or preservatives  pregnant or trying to get pregnant  breast-feeding    How should I use this medicine?  This vaccine is for injection in a muscle. It is given by a health care professional.  The vaccine series requires 2 doses for full effect  The second dose should be given somewhere between 2-6 months after the initial injection is given.    What if I miss a dose?  Keep appointments for follow-up (booster) doses as directed. It is important not to miss your dose.   Call your doctor or health care professional if you are unable to keep an appointment.    What may interact with this medicine?  medicines that suppress your immune system  medicines to treat cancer  steroid medicines like prednisone or cortisone    This list may not describe all possible interactions. Give your health care provider a list of all the medicines, herbs, non-prescription drugs, or dietary supplements you use. Also tell them if you smoke, drink alcohol, or use illegal drugs. Some items may interact with your medicine.    What should I watch for while using this medicine?  Visit your doctor for regular check ups.  This vaccine, like all vaccines, may not fully protect everyone.    What side effects may I notice from receiving this medicine?  Side effects that you should report to your  doctor or health care professional as soon as possible:  allergic reactions like skin rash, itching or hives, swelling of the face, lips, or tongue  breathing problems  Side effects that usually do not require medical attention (report these to your doctor or health care professional if they continue or are bothersome):  chills  headache  fever  nausea, vomiting  redness, warmth, pain, swelling or itching at site where injected  tiredness  This list may not describe all possible side effects. Call your doctor for medical advice about side effects. You may report side effects to FDA at 1-166-RCQ-7842.    Where should I keep my medicine?  This vaccine is only given in a clinic, pharmacy, doctor's office, or other health care setting and will not be stored at home.  NOTE: This sheet is a summary. It may not cover all possible information. If you have questions about this medicine, talk to your doctor, pharmacist, or health care provider.  © 2019 Elsevier/Gold Standard (2018-07-30 13:20:30)            Pneumococcal Conjugate Vaccine (Prevnar 20)    What is this medicine?  PNEUMOCOCCAL VACCINE (ALY mo ISABELL al vak SEEN) is a vaccine. It prevents pneumococcus bacterial infections. These bacteria can cause serious infections like pneumonia, meningitis, and blood infections. This vaccine will not treat an infection and will not cause infection. This vaccine is recommended for adults 18 years and older.  This medicine may be used for other purposes; ask your health care provider or pharmacist if you have questions.  COMMON BRAND NAME(S): Prevnar 20  What should I tell my health care provider before I take this medicine?  They need to know if you have any of these conditions:  bleeding disorder  fever  immune system problems  an unusual or allergic reaction to pneumococcal vaccine, diphtheria toxoid, other vaccines, other medicines, foods, dyes, or preservatives  pregnant or trying to get pregnant  breast-feeding  How should I  use this medicine?  This vaccine is injected into a muscle. It is given by a health care provider.  A copy of Vaccine Information Statements will be given before each vaccination. Be sure to read this information carefully each time. This sheet may change often.  Talk to your health care provider about the use of this medicine in children. Special care may be needed.  Overdosage: If you think you have taken too much of this medicine contact a poison control center or emergency room at once.  NOTE: This medicine is only for you. Do not share this medicine with others.  What may interact with this medicine?  medicines for cancer chemotherapy  medicines that suppress your immune function  steroid medicines like prednisone or cortisone  This list may not describe all possible interactions. Give your health care provider a list of all the medicines, herbs, non-prescription drugs, or dietary supplements you use. Also tell them if you smoke, drink alcohol, or use illegal drugs. Some items may interact with your medicine.  What should I watch for while using this medicine?  Mild fever and pain should go away in 3 days or less. Report any unusual symptoms to your health care provider.  What side effects may I notice from receiving this medicine?  Side effects that you should report to your doctor or health care professional as soon as possible:  allergic reactions (skin rash, itching or hives; swelling of the face, lips, or tongue)  confusion  fast, irregular heartbeat  fever over 102 degrees F  muscle weakness  seizures  trouble breathing  unusual bruising or bleeding  Side effects that usually do not require medical attention (report to your doctor or health care professional if they continue or are bothersome):  headache  joint pain  muscle cramps, pain  pain, tender at site where injected  This list may not describe all possible side effects. Call your doctor for medical advice about side effects. You may report side  effects to FDA at 3-623-FDA-6351.  Where should I keep my medicine?  This vaccine is only given by a health care provider. It will not be stored at home.    NOTE: This sheet is a summary. It may not cover all possible information. If you have questions about this medicine, talk to your doctor, pharmacist, or health care provider.  © 2021 Elsevier/Gold Standard (2021-08-26 16:14:35)
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
## 2025-04-09 LAB — REF LAB TEST METHOD: NORMAL

## 2025-04-11 ENCOUNTER — OFFICE VISIT (OUTPATIENT)
Dept: FAMILY MEDICINE CLINIC | Facility: CLINIC | Age: 55
End: 2025-04-11
Payer: COMMERCIAL

## 2025-04-11 VITALS
HEIGHT: 62 IN | BODY MASS INDEX: 24.29 KG/M2 | HEART RATE: 70 BPM | OXYGEN SATURATION: 100 % | WEIGHT: 132 LBS | SYSTOLIC BLOOD PRESSURE: 106 MMHG | DIASTOLIC BLOOD PRESSURE: 60 MMHG

## 2025-04-11 DIAGNOSIS — Z86.39 HISTORY OF OBESITY: Primary | ICD-10-CM
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
## 2025-05-13 DIAGNOSIS — Z86.39 HISTORY OF OBESITY: Primary | ICD-10-CM

## 2025-05-14 RX ORDER — TIRZEPATIDE 2.5 MG/.5ML
2.5 INJECTION, SOLUTION SUBCUTANEOUS WEEKLY
Qty: 2 ML | Refills: 0 | Status: SHIPPED | OUTPATIENT
Start: 2025-05-14

## 2025-05-14 NOTE — TELEPHONE ENCOUNTER
I discussed the situation with compounded semaglutide with the patient over the phone. She would like to transition to cash pay for Zepbound through [facility] pharmacy after discussion.

## 2025-05-14 NOTE — TELEPHONE ENCOUNTER
Rx Refill Note  Requested Prescriptions     Pending Prescriptions Disp Refills    Semaglutide, 2 MG/DOSE, (OZEMPIC) 8 MG/3ML solution pen-injector       Sig: Inject 2 mg under the skin into the appropriate area as directed 1 (One) Time Per Week.      Last office visit with prescribing clinician: 4/11/2025   Last telemedicine visit with prescribing clinician: Visit date not found   Next office visit with prescribing clinician: 7/11/2025                         Would you like a call back once the refill request has been completed: [] Yes [] No    If the office needs to give you a call back, can they leave a voicemail: [] Yes [] No    Toshia May MA  05/14/25, 10:43 EDT

## 2025-05-28 RX ORDER — ESTRADIOL 2 MG/1
2 RING VAGINAL
Qty: 1 EACH | Refills: 3 | Status: SHIPPED | OUTPATIENT
Start: 2025-05-28 | End: 2025-08-26

## 2025-05-30 DIAGNOSIS — Z86.39 HISTORY OF OBESITY: ICD-10-CM

## 2025-05-30 RX ORDER — TIRZEPATIDE 2.5 MG/.5ML
INJECTION, SOLUTION SUBCUTANEOUS
Qty: 2 ML | Refills: 0 | Status: SHIPPED | OUTPATIENT
Start: 2025-05-30

## 2025-05-31 ENCOUNTER — TELEPHONE (OUTPATIENT)
Dept: OBSTETRICS AND GYNECOLOGY | Facility: CLINIC | Age: 55
End: 2025-05-31
Payer: COMMERCIAL

## 2025-05-31 NOTE — TELEPHONE ENCOUNTER
Please call Laure and let her know her insurance did not want to cover the estrogen ring.  Her choices are to pay out-of-pocket or to use a different form of topical estrogen such as estrogen creams or vaginal estrogen tablets.  The creams and tablets are both inserted nightly twice per week.  There is also a tablet that can be taken by mouth which is a daily tablet.  Many times insurance will not cover this 1 but we can try that if she would prefer.

## 2025-06-02 NOTE — TELEPHONE ENCOUNTER
Pt states that she will call her pharmacy to find out the cost of her estrogen ring just to verify that the out of pocket cost is too high.     She does not have a preference between tablet or cream. She will call the office back and let us know that the cost of the ring is too high or not so the vaginal tablet or cream could be called in.

## 2025-06-13 ENCOUNTER — TELEPHONE (OUTPATIENT)
Dept: PAIN MEDICINE | Facility: CLINIC | Age: 55
End: 2025-06-13
Payer: COMMERCIAL

## 2025-06-13 ENCOUNTER — TELEPHONE (OUTPATIENT)
Dept: PAIN MEDICINE | Facility: CLINIC | Age: 55
End: 2025-06-13

## 2025-06-13 NOTE — TELEPHONE ENCOUNTER
Caller: Laure Valentin    Relationship: Self    Best call back number: [phone]     What form or medical record are you requesting: LETTER      Timeframe paperwork needed: ASAP    Additional notes: PT WOULD LIKE TO HAVE A COPY OF THE EXACT LETTER THAT IS SHOWING IN THE MEDIA-  THIS LETTER IS DATED 3.20.24 AND PT USED IT FOR HER GYM MEMBERSHIP-     PT WOULD LIKE TO JUST HAVE THE DATE UPDATED TO 2025    [facility]      PLEASE CONTACT PT AND ADVISE

## 2025-06-13 NOTE — TELEPHONE ENCOUNTER
Name: Laure Valentin      Relationship: Self      Best Callback Number: [phone]       HUB PROVIDED THE RELAY MESSAGE FROM THE OFFICE      PATIENT: SCHEDULED PER NOTE    ADDITIONAL INFORMATION:

## 2025-06-13 NOTE — TELEPHONE ENCOUNTER
Attempted to contact patient, no answer. LVM to return call to office and provided call back number.      HUB relay:     Please see if patient is welling to change appt to [place] location, provide address. If prefers the [place] location, reschedule appt to either Tuesday or Thursday.

## 2025-06-20 DIAGNOSIS — Z86.39 HISTORY OF OBESITY: ICD-10-CM

## 2025-06-20 RX ORDER — TIRZEPATIDE 2.5 MG/.5ML
INJECTION, SOLUTION SUBCUTANEOUS
Qty: 2 ML | Refills: 1 | Status: SHIPPED | OUTPATIENT
Start: 2025-06-20

## 2025-07-09 ENCOUNTER — OFFICE VISIT (OUTPATIENT)
Age: 55
End: 2025-07-09
Payer: COMMERCIAL

## 2025-07-09 ENCOUNTER — TELEPHONE (OUTPATIENT)
Age: 55
End: 2025-07-09

## 2025-07-09 VITALS — HEIGHT: 62 IN | WEIGHT: 134 LBS | BODY MASS INDEX: 24.66 KG/M2

## 2025-07-09 DIAGNOSIS — M47.812 CERVICAL SPONDYLOSIS WITHOUT MYELOPATHY: ICD-10-CM

## 2025-07-09 DIAGNOSIS — M50.30 DDD (DEGENERATIVE DISC DISEASE), CERVICAL: ICD-10-CM

## 2025-07-09 DIAGNOSIS — M50.30 CERVICAL DISCOGENIC PAIN SYNDROME: ICD-10-CM

## 2025-07-09 PROCEDURE — 99214 OFFICE O/P EST MOD 30 MIN: CPT | Performed by: NURSE PRACTITIONER

## 2025-07-09 NOTE — TELEPHONE ENCOUNTER
Attempted to contact patient, no answer. LVM to return call to office and provided call back number.      HUB relay:     If calls back, please see if patient is available to come in sooner for her appointment today with Henri
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
## 2025-07-15 ENCOUNTER — PATIENT MESSAGE (OUTPATIENT)
Dept: FAMILY MEDICINE CLINIC | Facility: CLINIC | Age: 55
End: 2025-07-15
Payer: COMMERCIAL

## 2025-07-15 RX ORDER — SCOPOLAMINE 1 MG/3D
1 PATCH, EXTENDED RELEASE TRANSDERMAL
Qty: 4 PATCH | Refills: 0 | Status: SHIPPED | OUTPATIENT
Start: 2025-07-15

## 2025-07-21 ENCOUNTER — OFFICE VISIT (OUTPATIENT)
Dept: FAMILY MEDICINE CLINIC | Facility: CLINIC | Age: 55
End: 2025-07-21
Payer: COMMERCIAL

## 2025-07-21 VITALS
WEIGHT: 132.6 LBS | BODY MASS INDEX: 24.4 KG/M2 | SYSTOLIC BLOOD PRESSURE: 108 MMHG | HEART RATE: 75 BPM | OXYGEN SATURATION: 97 % | HEIGHT: 62 IN | DIASTOLIC BLOOD PRESSURE: 68 MMHG

## 2025-07-21 DIAGNOSIS — Z86.39 HISTORY OF OBESITY: Primary | ICD-10-CM

## 2025-07-21 PROCEDURE — 99213 OFFICE O/P EST LOW 20 MIN: CPT | Performed by: PHYSICIAN ASSISTANT

## 2025-07-21 RX ORDER — TIRZEPATIDE 2.5 MG/.5ML
2.5 INJECTION, SOLUTION SUBCUTANEOUS WEEKLY
Qty: 2 ML | Refills: 3 | Status: SHIPPED | OUTPATIENT
Start: 2025-07-21
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
## (undated) DEVICE — PK BARIATRIC 10

## (undated) DEVICE — INJ DUPLOJECT TISSEEL 4ML

## (undated) DEVICE — MEDI-VAC NON-CONDUCTIVE SUCTION TUBING: Brand: CARDINAL HEALTH

## (undated) DEVICE — SUT MONOCRYL PLS ANTIB UND 3/0  PS1 27IN

## (undated) DEVICE — FLTR HME STR UNIV W/SMPL PORT

## (undated) DEVICE — SKIN AFFIX SURG ADHESIVE 72/CS 0.55ML: Brand: MEDLINE

## (undated) DEVICE — HARMONIC ACE +7 LAPAROSCOPIC SHEARS ADVANCED HEMOSTASIS 5MM DIAMETER 45CM SHAFT LENGTH  FOR USE WITH GRAY HAND PIECE ONLY: Brand: HARMONIC ACE

## (undated) DEVICE — MEDI-VAC YANKAUER SUCTION HANDLE W/BULBOUS TIP: Brand: CARDINAL HEALTH

## (undated) DEVICE — ECHELON FLEX POWERED PLUS LONG ARTICULATING ENDOSCOPIC LINEAR CUTTER, 60MM: Brand: ECHELON FLEX

## (undated) DEVICE — 50" SINGLE PATIENT USE HOVERMATT: Brand: SINGLE PATIENT USE HOVERMATT

## (undated) DEVICE — FLTR PLUMEPORT LAP W/CONN STRL

## (undated) DEVICE — ENDOPATH XCEL BLADELESS TROCARS WITH STABILITY SLEEVES: Brand: ENDOPATH XCEL

## (undated) DEVICE — APPL ST DUPLOSPRAY MIS 40CM

## (undated) DEVICE — ADAPT ST INFUS ADMIN SYR 70IN

## (undated) DEVICE — DUAL LUMEN STOMACH TUBE,ANTI-REFLUX VALVE: Brand: SALEM SUMP

## (undated) DEVICE — TP PAPR MICROPORE 2IN

## (undated) DEVICE — TISSUE RETRIEVAL SYSTEM: Brand: INZII RETRIEVAL SYSTEM

## (undated) DEVICE — ENDOPATH XCEL UNIVERSAL TROCAR STABLILITY SLEEVES: Brand: ENDOPATH XCEL

## (undated) DEVICE — GLV SURG SIGNATURE TOUCH PF LTX 8 STRL BX/50

## (undated) DEVICE — [HIGH FLOW HEATED INSUFFLATOR TUBING,  DO NOT USE IF PACKAGE IS DAMAGED]

## (undated) DEVICE — ENCORE® LATEX MICRO SIZE 7.5, STERILE LATEX POWDER-FREE SURGICAL GLOVE: Brand: ENCORE

## (undated) DEVICE — TROCAR: Brand: KII FIOS FIRST ENTRY

## (undated) DEVICE — ST NERV BLCK CONT CONTIPLEX ECHO CLSD 18G 4IN

## (undated) DEVICE — INTENDED USE FOR SURGICAL MARKING ON INTACT SKIN, ALSO PROVIDES A PERMANENT METHOD OF IDENTIFYING OBJECTS IN THE OPERATING ROOM: Brand: WRITESITE® REGULAR TIP SKIN MARKER

## (undated) DEVICE — AIRWY 90MM NO9

## (undated) DEVICE — ST INF 2NDARY 20DRP VNT/NOVNT 30IN

## (undated) DEVICE — SYS CLS PORTSITE CT CLOSESURE 5AND10/12

## (undated) DEVICE — DEFOGGER!" ANTI FOG KIT: Brand: DEROYAL

## (undated) DEVICE — CANNULA,ADULT,SOFT-TOUCH,7TUBE,SC: Brand: MEDLINE